# Patient Record
Sex: FEMALE | Race: WHITE | NOT HISPANIC OR LATINO | Employment: UNEMPLOYED | URBAN - NONMETROPOLITAN AREA
[De-identification: names, ages, dates, MRNs, and addresses within clinical notes are randomized per-mention and may not be internally consistent; named-entity substitution may affect disease eponyms.]

---

## 2017-01-02 PROBLEM — F32.9 REACTIVE DEPRESSION: Status: ACTIVE | Noted: 2017-01-02

## 2017-01-04 ENCOUNTER — OFFICE VISIT (OUTPATIENT)
Dept: PULMONOLOGY | Facility: CLINIC | Age: 52
End: 2017-01-04

## 2017-01-04 VITALS
HEIGHT: 64 IN | WEIGHT: 293 LBS | RESPIRATION RATE: 18 BRPM | HEART RATE: 79 BPM | OXYGEN SATURATION: 90 % | BODY MASS INDEX: 50.02 KG/M2 | DIASTOLIC BLOOD PRESSURE: 74 MMHG | SYSTOLIC BLOOD PRESSURE: 122 MMHG

## 2017-01-04 DIAGNOSIS — E66.01 MORBID OBESITY, UNSPECIFIED OBESITY TYPE (HCC): ICD-10-CM

## 2017-01-04 DIAGNOSIS — R06.02 SHORTNESS OF BREATH: Primary | ICD-10-CM

## 2017-01-04 DIAGNOSIS — J30.89 OTHER ALLERGIC RHINITIS: ICD-10-CM

## 2017-01-04 DIAGNOSIS — G47.34 NOCTURNAL HYPOXIA: ICD-10-CM

## 2017-01-04 DIAGNOSIS — I27.20 PULMONARY HYPERTENSION (HCC): ICD-10-CM

## 2017-01-04 DIAGNOSIS — G47.33 OBSTRUCTIVE SLEEP APNEA: ICD-10-CM

## 2017-01-04 DIAGNOSIS — I82.4Y9 DEEP VEIN THROMBOSIS (DVT) OF PROXIMAL LOWER EXTREMITY, UNSPECIFIED CHRONICITY, UNSPECIFIED LATERALITY (HCC): ICD-10-CM

## 2017-01-04 DIAGNOSIS — J44.9 CHRONIC OBSTRUCTIVE PULMONARY DISEASE, UNSPECIFIED COPD TYPE (HCC): ICD-10-CM

## 2017-01-04 PROCEDURE — 99214 OFFICE O/P EST MOD 30 MIN: CPT | Performed by: INTERNAL MEDICINE

## 2017-01-04 RX ORDER — FLUTICASONE PROPIONATE 50 MCG
1 SPRAY, SUSPENSION (ML) NASAL 2 TIMES DAILY
Qty: 1 EACH | Refills: 5 | Status: SHIPPED | OUTPATIENT
Start: 2017-01-04 | End: 2019-01-15 | Stop reason: SDUPTHER

## 2017-01-04 NOTE — PROGRESS NOTES
"Chief Complaint   Patient presents with   • Follow-up         Subjective   Chante Wetzel is a 51 y.o. female.     History of Present Illness   Patient comes in today to follow-up on the results of 2-D echocardiogram.  She is also here to follow-up on COPD, obstructive sleep apnea and DVT/PE    The patient continues to be on Coumadin and denies any episodes of bleeding    She continues to feel that the Tudorza is helping her symptoms to a great degree.  She still has occasional cough and shortness of breath with exertion     She denies any lower extremity swelling    She is using her CPAP device at a pressure of 13 without any significant issues.  She continues to need oxygen to be bled in with the CPAP device    The following portions of the patient's history were reviewed and updated as appropriate: allergies, current medications, past family history, past medical history, past social history and past surgical history.    Review of Systems   HENT: Positive for sinus pressure and sneezing. Negative for sore throat.    Respiratory: Positive for shortness of breath. Negative for cough and wheezing.        Objective   Visit Vitals   • /74   • Pulse 79   • Resp 18   • Ht 64\" (162.6 cm)   • Wt 300 lb (136 kg)   • SpO2 90%   • BMI 51.49 kg/m2     Physical Exam  Constitutional:           General: No acute distress noted.     ENT:           Mallempati III/IV. Crowded oropharynx.            Tonsils not erythematous or enlarged.           Sinuses were non tender on palpation.           Nostril showed moderate erythema.    Neck:           Increased adipose tissue noted.     Cardiovascular:              S1 + S2.  2+/6 systolic ejection murmur    Respiratory:          Respiratory effort was normal          Normal to percussion.          Good Air Entry Bilaterally with no wheezing or crackles heard.    Musculoskeletal:           Normal Gait.            No edema noted    Neurologic:            AAO x 3.             Affect " was appropriate    Assessment/Plan   Chante was seen today for follow-up.    Diagnoses and all orders for this visit:    Shortness of breath    Obstructive sleep apnea    Pulmonary hypertension  -     Ambulatory Referral to Cardiology    Chronic obstructive pulmonary disease, unspecified COPD type    Morbid obesity, unspecified obesity type    Nocturnal hypoxia    Deep vein thrombosis (DVT) of proximal lower extremity, unspecified chronicity, unspecified laterality    Other allergic rhinitis    Other orders  -     albuterol (PROAIR RESPICLICK) 108 (90 BASE) MCG/ACT inhaler; Inhale 1 puff Every 4 (Four) Hours As Needed for wheezing or shortness of air. Patient can't tolerate MDI/HFAs.  -     fluticasone (FLONASE) 50 MCG/ACT nasal spray; 1 spray into each nostril 2 (Two) Times a Day.         Return in about 2 months (around 3/4/2017) for Recheck ; RHC results, Cardio consult.    DISCUSSION (if any):  Have had a very long discussion with the patient including review of her past medical records from [facility] and once again showed her that the VQ scan was intermediate probability for pulmonary embolism.  The patient has been on Coumadin since then and continues to be on it    I will also reviewed her 2-D echocardiogram and told her that based on my comparison to the last echo from 2014, findings seem to have remained stable.  Although pulmonary pressures were not measured on the echocardiogram interpretation, the detail measurement lists an RVSP of 65 which once again seems to be very close to the finding in 2014    The patient is also on sildenafil for pulmonary hypertension and although clinically, she seems to have secondary pulmonary hypertension due to underlying moderate COPD and obstructive sleep apnea, I feel that she needs a right heart catheterization with measurements of PVR, pulmonary artery wedge pressure, mean pulmonary artery pressure, right-sided pressures, vasoreactivity testing and fluid  challenge to ascertain the contribution of diastolic dysfunction towards her pulmonary pressures, if her wedge pressure initially is less than 15    I've made no changes to her current medication including Tudorza    Patient was advised to use albuterol based rescue inhaler for when necessary purposes    Patient was also advised to keep a log of the use of rescue inhaler.    Patient will be started on nasal steroids for symptoms which are definitely consistent with allergic rhinitis.     If symptoms do not improve, then we will consider ordering  IgE/RAST panel.    I once again asked her to discuss the possibility of switching her to a newer agent such as Xarelto, Pradaxa or Eliquis.    Continue CPAP at a pressure of 13 with oxygen bled in      Errors in dictation may reflect use of voice recognition software and not all errors in transcription may have been detected prior to signing    This document was electronically signed by Yuridia Mccormack MD January 4, 2017  2:23 PM

## 2017-01-04 NOTE — LETTER
"January 4, 2017     Jade Husain DO  [address] [facility] [place] [address]    Patient: Chante Wetzel   YOB: 1965   Date of Visit: 1/4/2017       Dear Dr. Rodrigue DO:    Chante Wetzel was in my office today. Below is a copy of my note.    If you have questions, please do not hesitate to call me. I look forward to following Chante along with you.         Sincerely,        Yuriida Mccormack MD        CC: Cameron Baires MD    Chief Complaint   Patient presents with   • Follow-up         Subjective   Chante Wetzel is a 51 y.o. female.     History of Present Illness   Patient comes in today to follow-up on the results of 2-D echocardiogram.  She is also here to follow-up on COPD, obstructive sleep apnea and DVT/PE    The patient continues to be on Coumadin and denies any episodes of bleeding    She continues to feel that the Tudorza is helping her symptoms to a great degree.  She still has occasional cough and shortness of breath with exertion     She denies any lower extremity swelling    She is using her CPAP device at a pressure of 13 without any significant issues.  She continues to need oxygen to be bled in with the CPAP device    The following portions of the patient's history were reviewed and updated as appropriate: allergies, current medications, past family history, past medical history, past social history and past surgical history.    Review of Systems   HENT: Positive for sinus pressure and sneezing. Negative for sore throat.    Respiratory: Positive for shortness of breath. Negative for cough and wheezing.        Objective   Visit Vitals   • /74   • Pulse 79   • Resp 18   • Ht 64\" (162.6 cm)   • Wt 300 lb (136 kg)   • SpO2 90%   • BMI 51.49 kg/m2     Physical Exam  Constitutional:           General: No acute distress noted.     ENT:           Mallempati III/IV. Crowded oropharynx.            Tonsils not erythematous or enlarged.           Sinuses were non tender on " palpation.           Nostril showed moderate erythema.    Neck:           Increased adipose tissue noted.     Cardiovascular:              S1 + S2.  2+/6 systolic ejection murmur    Respiratory:          Respiratory effort was normal          Normal to percussion.          Good Air Entry Bilaterally with no wheezing or crackles heard.    Musculoskeletal:           Normal Gait.            No edema noted    Neurologic:            AAO x 3.             Affect was appropriate    Assessment/Plan   Chante was seen today for follow-up.    Diagnoses and all orders for this visit:    Shortness of breath    Obstructive sleep apnea    Pulmonary hypertension  -     Ambulatory Referral to Cardiology    Chronic obstructive pulmonary disease, unspecified COPD type    Morbid obesity, unspecified obesity type    Nocturnal hypoxia    Deep vein thrombosis (DVT) of proximal lower extremity, unspecified chronicity, unspecified laterality    Other allergic rhinitis    Other orders  -     albuterol (PROAIR RESPICLICK) 108 (90 BASE) MCG/ACT inhaler; Inhale 1 puff Every 4 (Four) Hours As Needed for wheezing or shortness of air. Patient can't tolerate MDI/HFAs.  -     fluticasone (FLONASE) 50 MCG/ACT nasal spray; 1 spray into each nostril 2 (Two) Times a Day.         Return in about 2 months (around 3/4/2017) for Recheck ; RHC results, Cardio consult.    DISCUSSION (if any):  Have had a very long discussion with the patient including review of her past medical records from [facility] and once again showed her that the VQ scan was intermediate probability for pulmonary embolism.  The patient has been on Coumadin since then and continues to be on it    I will also reviewed her 2-D echocardiogram and told her that based on my comparison to the last echo from 2014, findings seem to have remained stable.  Although pulmonary pressures were not measured on the echocardiogram interpretation, the detail measurement lists an RVSP of 65  which once again seems to be very close to the finding in 2014    The patient is also on sildenafil for pulmonary hypertension and although clinically, she seems to have secondary pulmonary hypertension due to underlying moderate COPD and obstructive sleep apnea, I feel that she needs a right heart catheterization with measurements of PVR, pulmonary artery wedge pressure, mean pulmonary artery pressure, right-sided pressures, vasoreactivity testing and fluid challenge to ascertain the contribution of diastolic dysfunction towards her pulmonary pressures, if her wedge pressure initially is less than 15    I've made no changes to her current medication including Tudorza    Patient was advised to use albuterol based rescue inhaler for when necessary purposes    Patient was also advised to keep a log of the use of rescue inhaler.    Patient will be started on nasal steroids for symptoms which are definitely consistent with allergic rhinitis.     If symptoms do not improve, then we will consider ordering  IgE/RAST panel.    I once again asked her to discuss the possibility of switching her to a newer agent such as Xarelto, Pradaxa or Eliquis.    Continue CPAP at a pressure of 13 with oxygen bled in      Errors in dictation may reflect use of voice recognition software and not all errors in transcription may have been detected prior to signing    This document was electronically signed by Yuridia Mccormack MD January 4, 2017  2:23 PM

## 2017-01-04 NOTE — MR AVS SNAPSHOT
Chante Wetzel   1/4/2017 1:30 PM   Office Visit    Dept Phone:  [phone]   Encounter #:  [id number]    Provider:  Yuridia Mccormack MD   Department:  [facility] PULMONARY CRITICAL CARE AND SLEEP                Your Full Care Plan              Today's Medication Changes          These changes are accurate as of: 1/4/17  2:04 PM.  If you have any questions, ask your nurse or doctor.               Medication(s)that have changed:     * albuterol 108 (90 BASE) MCG/ACT inhaler   Commonly known as:  PROAIR RESPICLICK   Inhale 1 puff every 4 (four) hours as needed for wheezing or shortness of air.   What changed:  Another medication with the same name was added. Make sure you understand how and when to take each.   Changed by:  Yuridia Mccormack MD       * albuterol 108 (90 BASE) MCG/ACT inhaler   Commonly known as:  PROAIR RESPICLICK   Inhale 1 puff Every 4 (Four) Hours As Needed for wheezing or shortness of air. Patient can't tolerate MDI/HFAs.   What changed:  You were already taking a medication with the same name, and this prescription was added. Make sure you understand how and when to take each.   Changed by:  Yuridia Mccormack MD       fluticasone 50 MCG/ACT nasal spray   Commonly known as:  FLONASE   1 spray into each nostril 2 (Two) Times a Day.   What changed:  See the new instructions.   Changed by:  Yuridia Mccormack MD       * Notice:  This list has 2 medication(s) that are the same as other medications prescribed for you. Read the directions carefully, and ask your doctor or other care provider to review them with you.         Where to Get Your Medications      These medications were sent to [facility] [place] [address] - [address] [facility] - [phone]  - [phone]   [address] [facility] [address]     Phone:  [phone]     fluticasone 50 MCG/ACT nasal spray         You can get these medications from any pharmacy     Bring a paper  prescription for each of these medications     albuterol 108 (90 BASE) MCG/ACT inhaler                  Your Updated Medication List          This list is accurate as of: 1/4/17  2:04 PM.  Always use your most recent med list.                * albuterol 108 (90 BASE) MCG/ACT inhaler   Commonly known as:  PROAIR RESPICLICK   Inhale 1 puff every 4 (four) hours as needed for wheezing or shortness of air.       * albuterol 108 (90 BASE) MCG/ACT inhaler   Commonly known as:  PROAIR RESPICLICK   Inhale 1 puff Every 4 (Four) Hours As Needed for wheezing or shortness of air. Patient can't tolerate MDI/HFAs.       bumetanide 1 MG tablet   Commonly known as:  BUMEX       COMBIVENT RESPIMAT  MCG/ACT inhaler   Generic drug:  ipratropium-albuterol   Inhale 1 puff 4 (Four) Times a Day.       COUMADIN 2 MG tablet   Generic drug:  warfarin       ferrous sulfate 325 (65 FE) MG tablet       fluticasone 50 MCG/ACT nasal spray   Commonly known as:  FLONASE   1 spray into each nostril 2 (Two) Times a Day.       gabapentin 800 MG tablet   Commonly known as:  NEURONTIN       HYDROcodone-acetaminophen 7.5-325 MG per tablet   Commonly known as:  NORCO       lisinopril 5 MG tablet   Commonly known as:  PRINIVIL,ZESTRIL       metoprolol tartrate 25 MG tablet   Commonly known as:  LOPRESSOR       nystatin 585307 UNIT/ML suspension   Commonly known as:  MYCOSTATIN       pregabalin 75 MG capsule   Commonly known as:  LYRICA       sildenafil 20 MG tablet   Commonly known as:  REVATIO       triamcinolone 0.025 % cream   Commonly known as:  KENALOG       TUDORZA PRESSAIR 400 MCG/ACT aerosol powder  powder for inhalation   Generic drug:  aclidinium bromide       VITAMIN K PO       * Notice:  This list has 2 medication(s) that are the same as other medications prescribed for you. Read the directions carefully, and ask your doctor or other care provider to review them with you.            We Performed the Following     Ambulatory Referral to  Cardiology       You Were Diagnosed With        Codes Comments    Shortness of breath    -  Primary ICD-10-CM: R06.02  ICD-9-CM: 786.05     Obstructive sleep apnea     ICD-10-CM: G47.33  ICD-9-CM: 327.23     Pulmonary hypertension     ICD-10-CM: I27.2  ICD-9-CM: 416.8     Chronic obstructive pulmonary disease, unspecified COPD type     ICD-10-CM: J44.9  ICD-9-CM: 496     Morbid obesity, unspecified obesity type     ICD-10-CM: E66.01  ICD-9-CM: 278.01     Nocturnal hypoxia     ICD-10-CM: G47.34  ICD-9-CM: 327.24     Deep vein thrombosis (DVT) of proximal lower extremity, unspecified chronicity, unspecified laterality     ICD-10-CM: I82.4Y9  ICD-9-CM: 453.41     Other allergic rhinitis     ICD-10-CM: J30.89  ICD-9-CM: 477.8       Instructions     None    Patient Instructions History      Upcoming Appointments     Visit Type Date Time Department    FOLLOW UP 2017  1:30 PM [facility]    FOLLOW UP 3/6/2017  2:15 PM [facility]      MyChart Signup     [facility] [employer] allows you to send messages to your doctor, view your test results, renew your prescriptions, schedule appointments, and more. To sign up, go to [employer] and click on the Sign Up Now link in the New User? box. Enter your [employer] Activation Code exactly as it appears below along with the last four digits of your Social Security Number and your Date of Birth () to complete the sign-up process. If you do not sign up before the expiration date, you must request a new code.    [employer] Activation Code: [email or web address]  Expires: 2017  2:04 PM    If you have questions, you can email [email or web address] or call [phone] to talk to our [employer] staff. Remember, [employer] is NOT to be used for urgent needs. For medical emergencies, dial 911.               Other Info from Your Visit           Your Appointments     Mar 06, 2017  2:15 PM EST   Follow Up with Yuridia Mccormack MD   [facility]  "GROUP PULMONARY CRITICAL CARE AND SLEEP (--)    [address]  [facility] [address] [phone]           Arrive 15 minutes prior to appointment.              Allergies     Erythromycin  Rash      Reason for Visit     Follow-up           Vital Signs     Blood Pressure Pulse Respirations Height Weight Oxygen Saturation    122/74 79 18 64\" (162.6 cm) 300 lb (136 kg) 90%    Body Mass Index Smoking Status                51.49 kg/m2 Former Smoker          Problems and Diagnoses Noted     Chronic airway obstruction    Shortness of breath    Obesity    Pulmonary hypertension    Sleep apnea        Nocturnal hypoxia        Blood clot in leg        Other allergic rhinitis            "

## 2017-01-25 DIAGNOSIS — M54.9 CHRONIC BACK PAIN, UNSPECIFIED BACK LOCATION, UNSPECIFIED BACK PAIN LATERALITY: ICD-10-CM

## 2017-01-25 DIAGNOSIS — G89.29 CHRONIC BACK PAIN, UNSPECIFIED BACK LOCATION, UNSPECIFIED BACK PAIN LATERALITY: ICD-10-CM

## 2017-01-26 RX ORDER — HYDROCODONE BITARTRATE AND ACETAMINOPHEN 7.5; 325 MG/1; MG/1
1 TABLET ORAL EVERY 8 HOURS PRN
Qty: 90 TABLET | Refills: 0 | Status: SHIPPED | OUTPATIENT
Start: 2017-01-26 | End: 2017-02-24 | Stop reason: SDUPTHER

## 2017-02-13 RX ORDER — PREGABALIN 75 MG/1
75 CAPSULE ORAL 2 TIMES DAILY
Qty: 60 CAPSULE | Refills: 1 | Status: SHIPPED | OUTPATIENT
Start: 2017-02-13 | End: 2017-04-15 | Stop reason: SDUPTHER

## 2017-02-24 DIAGNOSIS — M54.9 CHRONIC BACK PAIN, UNSPECIFIED BACK LOCATION, UNSPECIFIED BACK PAIN LATERALITY: ICD-10-CM

## 2017-02-24 DIAGNOSIS — G89.29 CHRONIC BACK PAIN, UNSPECIFIED BACK LOCATION, UNSPECIFIED BACK PAIN LATERALITY: ICD-10-CM

## 2017-02-24 RX ORDER — HYDROCODONE BITARTRATE AND ACETAMINOPHEN 7.5; 325 MG/1; MG/1
1 TABLET ORAL EVERY 8 HOURS PRN
Qty: 90 TABLET | Refills: 0 | Status: SHIPPED | OUTPATIENT
Start: 2017-02-24 | End: 2017-03-22 | Stop reason: SDUPTHER

## 2017-03-09 ENCOUNTER — OFFICE VISIT (OUTPATIENT)
Dept: PRIMARY CARE CLINIC | Age: 52
End: 2017-03-09
Payer: MEDICAID

## 2017-03-09 ENCOUNTER — HOSPITAL ENCOUNTER (OUTPATIENT)
Dept: OTHER | Age: 52
Discharge: HOME | End: 2017-03-09
Attending: FAMILY MEDICINE | Admitting: FAMILY MEDICINE

## 2017-03-09 VITALS
DIASTOLIC BLOOD PRESSURE: 80 MMHG | WEIGHT: 293 LBS | HEIGHT: 64 IN | OXYGEN SATURATION: 98 % | HEART RATE: 62 BPM | SYSTOLIC BLOOD PRESSURE: 138 MMHG | BODY MASS INDEX: 50.02 KG/M2 | RESPIRATION RATE: 20 BRPM

## 2017-03-09 DIAGNOSIS — Z79.01 CHRONIC ANTICOAGULATION: ICD-10-CM

## 2017-03-09 DIAGNOSIS — J43.1 PANLOBULAR EMPHYSEMA (HCC): ICD-10-CM

## 2017-03-09 DIAGNOSIS — E11.42 TYPE 2 DIABETES MELLITUS WITH DIABETIC POLYNEUROPATHY, WITHOUT LONG-TERM CURRENT USE OF INSULIN (HCC): ICD-10-CM

## 2017-03-09 DIAGNOSIS — I10 ESSENTIAL HYPERTENSION: ICD-10-CM

## 2017-03-09 DIAGNOSIS — I10 ESSENTIAL HYPERTENSION: Primary | ICD-10-CM

## 2017-03-09 DIAGNOSIS — I27.20 PULMONARY HYPERTENSION (HCC): ICD-10-CM

## 2017-03-09 LAB
A/G RATIO: 1.3 (ref 0.8–2)
ALBUMIN SERPL-MCNC: 4.2 G/DL (ref 3.4–4.8)
ALP BLD-CCNC: 118 U/L (ref 25–100)
ALT SERPL-CCNC: 16 U/L (ref 4–36)
ANION GAP SERPL CALCULATED.3IONS-SCNC: 15 MMOL/L (ref 3–16)
AST SERPL-CCNC: 14 U/L (ref 8–33)
BILIRUB SERPL-MCNC: 1 MG/DL (ref 0.3–1.2)
BUN BLDV-MCNC: 14 MG/DL (ref 6–20)
CALCIUM SERPL-MCNC: 9.7 MG/DL (ref 8.5–10.5)
CHLORIDE BLD-SCNC: 98 MMOL/L (ref 98–107)
CHOLESTEROL, TOTAL: 113 MG/DL (ref 0–200)
CO2: 26 MMOL/L (ref 20–30)
CREAT SERPL-MCNC: 1 MG/DL (ref 0.4–1.2)
CREATININE URINE: 45.1 MG/DL (ref 1.5–300)
GFR AFRICAN AMERICAN: >59
GFR NON-AFRICAN AMERICAN: 58
GLOBULIN: 3.3 G/DL
GLUCOSE BLD-MCNC: 98 MG/DL (ref 74–106)
HBA1C MFR BLD: 6.8 %
HCT VFR BLD CALC: 45.5 % (ref 37–47)
HDLC SERPL-MCNC: 39 MG/DL (ref 40–60)
HEMOGLOBIN: 14.5 G/DL (ref 11.5–16.5)
INR BLD: 2.9 (ref 0.85–1.15)
LDL CHOLESTEROL CALCULATED: 43 MG/DL
LDL CHOLESTEROL DIRECT: 58 MG/DL
MCH RBC QN AUTO: 30.2 PG (ref 27–32)
MCHC RBC AUTO-ENTMCNC: 31.9 G/DL (ref 31–35)
MCV RBC AUTO: 94.8 FL (ref 80–100)
MICROALBUMIN UR-MCNC: <1.2 MG/DL (ref 0–22)
MICROALBUMIN/CREAT UR-RTO: NORMAL MG/G (ref 0–30)
PDW BLD-RTO: 13.9 % (ref 11–16)
PLATELET # BLD: 280 K/UL (ref 150–400)
PMV BLD AUTO: 11.7 FL (ref 6–10)
POTASSIUM SERPL-SCNC: 4.4 MMOL/L (ref 3.4–5.1)
PROTHROMBIN TIME: 31.9 SEC (ref 9.8–11.5)
RBC # BLD: 4.8 M/UL (ref 3.8–5.8)
SODIUM BLD-SCNC: 139 MMOL/L (ref 136–145)
TOTAL PROTEIN: 7.5 G/DL (ref 6.4–8.3)
TRIGL SERPL-MCNC: 154 MG/DL (ref 0–249)
VLDLC SERPL CALC-MCNC: 31 MG/DL
WBC # BLD: 9.1 K/UL (ref 4–11)

## 2017-03-09 PROCEDURE — 99214 OFFICE O/P EST MOD 30 MIN: CPT | Performed by: FAMILY MEDICINE

## 2017-03-09 RX ORDER — SILDENAFIL CITRATE 20 MG/1
20 TABLET ORAL 3 TIMES DAILY
Qty: 90 TABLET | Refills: 3 | Status: SHIPPED | OUTPATIENT
Start: 2017-03-09 | End: 2017-09-21 | Stop reason: SDUPTHER

## 2017-03-09 ASSESSMENT — ENCOUNTER SYMPTOMS
EYE ITCHING: 0
EYE REDNESS: 0
EYE DISCHARGE: 0
ABDOMINAL PAIN: 0
BACK PAIN: 1
NAUSEA: 0
RHINORRHEA: 0
VOMITING: 0
SHORTNESS OF BREATH: 0
DIARRHEA: 0
CONSTIPATION: 0
COUGH: 0
SORE THROAT: 0

## 2017-03-22 DIAGNOSIS — M54.9 CHRONIC BACK PAIN, UNSPECIFIED BACK LOCATION, UNSPECIFIED BACK PAIN LATERALITY: ICD-10-CM

## 2017-03-22 DIAGNOSIS — G89.29 CHRONIC BACK PAIN, UNSPECIFIED BACK LOCATION, UNSPECIFIED BACK PAIN LATERALITY: ICD-10-CM

## 2017-03-22 RX ORDER — HYDROCODONE BITARTRATE AND ACETAMINOPHEN 7.5; 325 MG/1; MG/1
1 TABLET ORAL EVERY 8 HOURS PRN
Qty: 90 TABLET | Refills: 0 | Status: SHIPPED | OUTPATIENT
Start: 2017-03-24 | End: 2017-04-24 | Stop reason: SDUPTHER

## 2017-03-29 DIAGNOSIS — E11.40 TYPE 2 DIABETES MELLITUS WITH DIABETIC NEUROPATHY, WITHOUT LONG-TERM CURRENT USE OF INSULIN (HCC): ICD-10-CM

## 2017-03-29 DIAGNOSIS — I10 ESSENTIAL HYPERTENSION: ICD-10-CM

## 2017-03-30 RX ORDER — LISINOPRIL 5 MG/1
TABLET ORAL
Qty: 30 TABLET | Refills: 3 | Status: SHIPPED | OUTPATIENT
Start: 2017-03-30 | End: 2017-06-09 | Stop reason: SDUPTHER

## 2017-04-07 DIAGNOSIS — M54.2 CHRONIC NECK AND BACK PAIN: ICD-10-CM

## 2017-04-07 DIAGNOSIS — M54.9 CHRONIC NECK AND BACK PAIN: ICD-10-CM

## 2017-04-07 DIAGNOSIS — G89.29 CHRONIC NECK AND BACK PAIN: ICD-10-CM

## 2017-04-10 ENCOUNTER — HOSPITAL ENCOUNTER (OUTPATIENT)
Dept: OTHER | Age: 52
Discharge: HOME | End: 2017-04-10
Attending: CHIROPRACTOR | Admitting: CHIROPRACTOR

## 2017-04-10 DIAGNOSIS — I10 ESSENTIAL HYPERTENSION: ICD-10-CM

## 2017-04-10 DIAGNOSIS — M54.2 CHRONIC NECK AND BACK PAIN: ICD-10-CM

## 2017-04-10 DIAGNOSIS — M54.2 NECK PAIN: ICD-10-CM

## 2017-04-10 DIAGNOSIS — M54.9 CHRONIC NECK AND BACK PAIN: ICD-10-CM

## 2017-04-10 DIAGNOSIS — M54.5 LOW BACK PAIN, UNSPECIFIED BACK PAIN LATERALITY, UNSPECIFIED CHRONICITY, WITH SCIATICA PRESENCE UNSPECIFIED: ICD-10-CM

## 2017-04-10 DIAGNOSIS — E11.40 TYPE 2 DIABETES MELLITUS WITH DIABETIC NEUROPATHY, WITHOUT LONG-TERM CURRENT USE OF INSULIN (HCC): ICD-10-CM

## 2017-04-10 DIAGNOSIS — G89.29 CHRONIC NECK AND BACK PAIN: ICD-10-CM

## 2017-04-10 RX ORDER — TIZANIDINE 4 MG/1
TABLET ORAL
Qty: 90 TABLET | Refills: 1 | Status: SHIPPED | OUTPATIENT
Start: 2017-04-10 | End: 2017-06-24 | Stop reason: SDUPTHER

## 2017-04-10 RX ORDER — BUMETANIDE 1 MG/1
1 TABLET ORAL DAILY
Qty: 60 TABLET | Refills: 3 | Status: SHIPPED | OUTPATIENT
Start: 2017-04-10 | End: 2017-12-08 | Stop reason: SDUPTHER

## 2017-04-10 RX ORDER — GLIMEPIRIDE 2 MG/1
2 TABLET ORAL
Qty: 30 TABLET | Refills: 3 | Status: SHIPPED | OUTPATIENT
Start: 2017-04-10 | End: 2017-08-23 | Stop reason: SDUPTHER

## 2017-04-10 RX ORDER — ALBUTEROL SULFATE 90 UG/1
2 AEROSOL, METERED RESPIRATORY (INHALATION) EVERY 6 HOURS PRN
Qty: 1 INHALER | Refills: 3 | Status: SHIPPED | OUTPATIENT
Start: 2017-04-10 | End: 2021-02-15

## 2017-04-10 RX ORDER — FERROUS SULFATE 325(65) MG
325 TABLET ORAL
Qty: 30 TABLET | Refills: 3 | Status: SHIPPED | OUTPATIENT
Start: 2017-04-10 | End: 2017-08-20 | Stop reason: SDUPTHER

## 2017-04-10 RX ORDER — ATORVASTATIN CALCIUM 40 MG/1
40 TABLET, FILM COATED ORAL NIGHTLY
Qty: 30 TABLET | Refills: 3 | Status: SHIPPED | OUTPATIENT
Start: 2017-04-10 | End: 2017-08-11 | Stop reason: SDUPTHER

## 2017-04-10 RX ORDER — TIZANIDINE 4 MG/1
TABLET ORAL
Qty: 90 TABLET | Refills: 1 | Status: CANCELLED | OUTPATIENT
Start: 2017-04-10

## 2017-04-19 ENCOUNTER — OFFICE VISIT (OUTPATIENT)
Dept: PULMONOLOGY | Facility: CLINIC | Age: 52
End: 2017-04-19

## 2017-04-19 VITALS
SYSTOLIC BLOOD PRESSURE: 124 MMHG | OXYGEN SATURATION: 92 % | DIASTOLIC BLOOD PRESSURE: 80 MMHG | WEIGHT: 293 LBS | BODY MASS INDEX: 50.02 KG/M2 | HEART RATE: 83 BPM | RESPIRATION RATE: 18 BRPM | HEIGHT: 64 IN

## 2017-04-19 DIAGNOSIS — R06.02 SHORTNESS OF BREATH: Primary | ICD-10-CM

## 2017-04-19 DIAGNOSIS — I82.4Y9 DEEP VEIN THROMBOSIS (DVT) OF PROXIMAL LOWER EXTREMITY, UNSPECIFIED CHRONICITY, UNSPECIFIED LATERALITY (HCC): ICD-10-CM

## 2017-04-19 DIAGNOSIS — G47.33 OBSTRUCTIVE SLEEP APNEA: ICD-10-CM

## 2017-04-19 DIAGNOSIS — R09.02 HYPOXIA: ICD-10-CM

## 2017-04-19 DIAGNOSIS — E66.01 MORBID OBESITY, UNSPECIFIED OBESITY TYPE (HCC): ICD-10-CM

## 2017-04-19 DIAGNOSIS — G47.34 NOCTURNAL HYPOXIA: ICD-10-CM

## 2017-04-19 DIAGNOSIS — I27.20 PULMONARY HYPERTENSION (HCC): ICD-10-CM

## 2017-04-19 DIAGNOSIS — J44.9 CHRONIC OBSTRUCTIVE PULMONARY DISEASE, UNSPECIFIED COPD TYPE (HCC): ICD-10-CM

## 2017-04-19 PROCEDURE — 94620 PR PULMONARY STRESS TESTING,SIMPLE: CPT | Performed by: INTERNAL MEDICINE

## 2017-04-19 PROCEDURE — 99214 OFFICE O/P EST MOD 30 MIN: CPT | Performed by: INTERNAL MEDICINE

## 2017-04-19 NOTE — PROGRESS NOTES
"Chief Complaint   Patient presents with   • Follow-up     SOB, ARIANNA         Subjective   Chante Wetzel is a 52 y.o. female.     History of Present Illness   Patient comes in today to follow-up on shortness of breath,COPD, obstructive sleep apnea and pulmonary hypertension.    She is using her Tudorza regularly and requires Ventolin once or twice a week.  She denies any significant cough or phlegm production.    She continues to use Coumadin on a regular basis.     She is using her CPAP device at a pressure of 13 with 2 L/m bled in without any significant issues whatsoever.    She is also using oxygen with exertion and at night.     The following portions of the patient's history were reviewed and updated as appropriate: allergies, current medications, past family history, past medical history, past social history and past surgical history.    Review of Systems   HENT: Positive for sneezing. Negative for sinus pressure and sore throat.    Respiratory: Positive for wheezing. Negative for cough and shortness of breath.        Objective   Visit Vitals   • /80   • Pulse 83   • Resp 18   • Ht 64\" (162.6 cm)   • Wt (!) 311 lb 9.6 oz (141 kg)   • SpO2 92%   • BMI 53.49 kg/m2       Physical Exam   Constitutional: She is oriented to person, place, and time. She appears well-developed and well-nourished.   HENT:   Head: Atraumatic.   Crowded oropharynx.   Eyes: EOM are normal.   Neck: Neck supple.   Increased adipose tissue   Cardiovascular: Normal rate and regular rhythm.    Pulmonary/Chest: Effort normal. No respiratory distress.   Somewhat hyperresonant to percussion  Somewhat decreased A/E without wheezing noted.   Musculoskeletal: She exhibits edema (Trace).   Gait was intact.   Neurological: She is alert and oriented to person, place, and time.   Skin: Skin is warm and dry.   Psychiatric: She has a normal mood and affect.   Vitals reviewed.        Assessment/Plan   Chante was seen today for " follow-up.    Diagnoses and all orders for this visit:    Shortness of breath  -     Ambulatory Referral to Cardiology  -     Converted Six Minute Walk    Chronic obstructive pulmonary disease, unspecified COPD type  -     Converted Six Minute Walk    Obstructive sleep apnea    Pulmonary hypertension  -     Ambulatory Referral to Cardiology    Morbid obesity, unspecified obesity type    Deep vein thrombosis (DVT) of proximal lower extremity, unspecified chronicity, unspecified laterality    Nocturnal hypoxia    Hypoxia         Return in about 3 months (around 7/19/2017) for Recheck, Echo results.    DISCUSSION (if any):  Reviewed the patient's records once again and told her that it seems that she is on Coumadin since she had 2 separate episodes concerning for pulmonary embolism/DVT.  Her first episode was in 2004 and then when she was admitted in 2014 it seems that one of the V/Q scans at [facility] was considered concerning for pulmonary embolism.  The patient's echocardiogram was reviewed and it continues to show elevated pulmonary pressures.  The echo actually suggests pulmonary hypertension to be severe.    ============================  ============================    I've asked the patient to continue her current medications.    She clearly has resting hypoxemia which responded to addition of 2 L nasal cannula and she will need to continue oxygen 24/7.    Her 6 minute walk test suggested the 2 L also sustained her oxygen levels at more than 90% throughout.    I once again asked her to discuss the possibility of switching her from Coumadin to either Eliquis, Xarelto or Pradaxa, for pulmonary embolism/DVT, since she would need to remain on anticoagulation for the rest of her life. This needs to be discussed with her PCP.    As far as her elevated pulmonary pressures are concerned, I have arranged a consultation with cardiology as she will definitely need a right heart catheterization to further  quantify right heart pressures and to rule out any left-sided disease that may be contributing.    Converted Six Minute Walk  Date/Time: 4/19/2017 3:55 PM  Performed by: YURIDIA MCCORMACK  Authorized by: YURIDIA MCCORMACK   Comments: 6 minute walk test    Total distance walked: 144 meters  Oxygen levels remained at more than 91 % during the walk.    Walk test was limited because of significant back pain        Errors in dictation may reflect use of voice recognition software and not all errors in transcription may have been detected prior to signing    This document was electronically signed by Yuridia Mccormack MD April 19, 2017  3:57 PM

## 2017-04-24 DIAGNOSIS — G89.29 CHRONIC BACK PAIN, UNSPECIFIED BACK LOCATION, UNSPECIFIED BACK PAIN LATERALITY: ICD-10-CM

## 2017-04-24 DIAGNOSIS — M54.9 CHRONIC BACK PAIN, UNSPECIFIED BACK LOCATION, UNSPECIFIED BACK PAIN LATERALITY: ICD-10-CM

## 2017-04-24 RX ORDER — HYDROCODONE BITARTRATE AND ACETAMINOPHEN 7.5; 325 MG/1; MG/1
1 TABLET ORAL EVERY 8 HOURS PRN
Qty: 90 TABLET | Refills: 0 | Status: SHIPPED | OUTPATIENT
Start: 2017-04-24 | End: 2017-05-23 | Stop reason: SDUPTHER

## 2017-04-24 RX ORDER — ERGOCALCIFEROL 1.25 MG/1
CAPSULE ORAL
Qty: 8 CAPSULE | Refills: 0 | Status: SHIPPED | OUTPATIENT
Start: 2017-04-24 | End: 2017-07-16 | Stop reason: SDUPTHER

## 2017-04-25 ENCOUNTER — TELEPHONE (OUTPATIENT)
Dept: PRIMARY CARE CLINIC | Age: 52
End: 2017-04-25

## 2017-04-26 ENCOUNTER — TELEPHONE (OUTPATIENT)
Dept: PULMONOLOGY | Facility: CLINIC | Age: 52
End: 2017-04-26

## 2017-04-26 NOTE — TELEPHONE ENCOUNTER
CALLED AND TALKED TO PHARMACY, SPOKE TO DIANNE, SAID THAT INCRUSE WAS COVERED, SWITCHED PATIENT FROM [facility].

## 2017-05-23 DIAGNOSIS — M54.9 CHRONIC BACK PAIN, UNSPECIFIED BACK LOCATION, UNSPECIFIED BACK PAIN LATERALITY: ICD-10-CM

## 2017-05-23 DIAGNOSIS — G89.29 CHRONIC BACK PAIN, UNSPECIFIED BACK LOCATION, UNSPECIFIED BACK PAIN LATERALITY: ICD-10-CM

## 2017-05-23 RX ORDER — HYDROCODONE BITARTRATE AND ACETAMINOPHEN 7.5; 325 MG/1; MG/1
1 TABLET ORAL EVERY 8 HOURS PRN
Qty: 90 TABLET | Refills: 0 | Status: SHIPPED | OUTPATIENT
Start: 2017-05-23 | End: 2017-06-20 | Stop reason: SDUPTHER

## 2017-05-30 ENCOUNTER — CONSULT (OUTPATIENT)
Dept: CARDIOLOGY | Facility: CLINIC | Age: 52
End: 2017-05-30

## 2017-05-30 VITALS
WEIGHT: 293 LBS | OXYGEN SATURATION: 96 % | DIASTOLIC BLOOD PRESSURE: 60 MMHG | SYSTOLIC BLOOD PRESSURE: 120 MMHG | BODY MASS INDEX: 50.02 KG/M2 | HEIGHT: 64 IN | TEMPERATURE: 97.3 F | HEART RATE: 75 BPM

## 2017-05-30 DIAGNOSIS — I27.81 COR PULMONALE (CHRONIC) (HCC): ICD-10-CM

## 2017-05-30 DIAGNOSIS — I10 ESSENTIAL HYPERTENSION: ICD-10-CM

## 2017-05-30 DIAGNOSIS — I27.20 PULMONARY HYPERTENSION (HCC): Primary | ICD-10-CM

## 2017-05-30 PROCEDURE — 93000 ELECTROCARDIOGRAM COMPLETE: CPT | Performed by: INTERNAL MEDICINE

## 2017-05-30 PROCEDURE — 99204 OFFICE O/P NEW MOD 45 MIN: CPT | Performed by: INTERNAL MEDICINE

## 2017-05-30 RX ORDER — ATORVASTATIN CALCIUM 40 MG/1
40 TABLET, FILM COATED ORAL DAILY
COMMUNITY
End: 2018-07-02 | Stop reason: SDUPTHER

## 2017-05-30 RX ORDER — GLIMEPIRIDE 2 MG/1
2 TABLET ORAL
COMMUNITY
End: 2017-10-02 | Stop reason: DRUGHIGH

## 2017-05-30 RX ORDER — TIZANIDINE 4 MG/1
4 TABLET ORAL NIGHTLY PRN
COMMUNITY
End: 2018-07-02 | Stop reason: SDUPTHER

## 2017-06-09 ENCOUNTER — OFFICE VISIT (OUTPATIENT)
Dept: PRIMARY CARE CLINIC | Age: 52
End: 2017-06-09
Payer: MEDICAID

## 2017-06-09 VITALS
HEIGHT: 64 IN | OXYGEN SATURATION: 95 % | SYSTOLIC BLOOD PRESSURE: 124 MMHG | RESPIRATION RATE: 20 BRPM | DIASTOLIC BLOOD PRESSURE: 72 MMHG | WEIGHT: 293 LBS | BODY MASS INDEX: 50.02 KG/M2 | HEART RATE: 68 BPM

## 2017-06-09 DIAGNOSIS — M54.2 CHRONIC NECK AND BACK PAIN: ICD-10-CM

## 2017-06-09 DIAGNOSIS — I10 ESSENTIAL HYPERTENSION: Primary | ICD-10-CM

## 2017-06-09 DIAGNOSIS — E11.40 TYPE 2 DIABETES MELLITUS WITH DIABETIC NEUROPATHY, WITHOUT LONG-TERM CURRENT USE OF INSULIN (HCC): ICD-10-CM

## 2017-06-09 DIAGNOSIS — M54.9 CHRONIC NECK AND BACK PAIN: ICD-10-CM

## 2017-06-09 DIAGNOSIS — G89.29 CHRONIC NECK AND BACK PAIN: ICD-10-CM

## 2017-06-09 DIAGNOSIS — Z79.01 CHRONIC ANTICOAGULATION: ICD-10-CM

## 2017-06-09 DIAGNOSIS — J43.1 PANLOBULAR EMPHYSEMA (HCC): ICD-10-CM

## 2017-06-09 LAB
A/G RATIO: 1.2 (ref 0.8–2)
ALBUMIN SERPL-MCNC: 4.1 G/DL (ref 3.4–4.8)
ALP BLD-CCNC: 108 U/L (ref 25–100)
ALT SERPL-CCNC: 18 U/L (ref 4–36)
ANION GAP SERPL CALCULATED.3IONS-SCNC: 16 MMOL/L (ref 3–16)
AST SERPL-CCNC: 16 U/L (ref 8–33)
BILIRUB SERPL-MCNC: 0.7 MG/DL (ref 0.3–1.2)
BUN BLDV-MCNC: 21 MG/DL (ref 6–20)
CALCIUM SERPL-MCNC: 9.8 MG/DL (ref 8.5–10.5)
CHLORIDE BLD-SCNC: 99 MMOL/L (ref 98–107)
CO2: 26 MMOL/L (ref 20–30)
CREAT SERPL-MCNC: 0.9 MG/DL (ref 0.4–1.2)
GFR AFRICAN AMERICAN: >59
GFR NON-AFRICAN AMERICAN: >60
GLOBULIN: 3.4 G/DL
GLUCOSE BLD-MCNC: 131 MG/DL (ref 74–106)
HCT VFR BLD CALC: 43.5 % (ref 37–47)
HEMOGLOBIN: 14.2 G/DL (ref 11.5–16.5)
INR BLD: 2.9 (ref 0.85–1.15)
MCH RBC QN AUTO: 30.8 PG (ref 27–32)
MCHC RBC AUTO-ENTMCNC: 32.6 G/DL (ref 31–35)
MCV RBC AUTO: 94.4 FL (ref 80–100)
PDW BLD-RTO: 14.1 % (ref 11–16)
PLATELET # BLD: 263 K/UL (ref 150–400)
PMV BLD AUTO: 11.5 FL (ref 6–10)
POTASSIUM SERPL-SCNC: 4.6 MMOL/L (ref 3.4–5.1)
PROTHROMBIN TIME: 31.8 SEC (ref 9.8–11.5)
RBC # BLD: 4.61 M/UL (ref 3.8–5.8)
SODIUM BLD-SCNC: 141 MMOL/L (ref 136–145)
TOTAL PROTEIN: 7.5 G/DL (ref 6.4–8.3)
WBC # BLD: 8.4 K/UL (ref 4–11)

## 2017-06-09 PROCEDURE — 99214 OFFICE O/P EST MOD 30 MIN: CPT | Performed by: FAMILY MEDICINE

## 2017-06-09 RX ORDER — PREGABALIN 75 MG/1
CAPSULE ORAL
Qty: 60 CAPSULE | Refills: 2 | Status: SHIPPED | OUTPATIENT
Start: 2017-06-09 | End: 2017-09-07 | Stop reason: SDUPTHER

## 2017-06-09 RX ORDER — WARFARIN SODIUM 2 MG/1
TABLET ORAL
Qty: 45 TABLET | Refills: 3 | Status: CANCELLED | OUTPATIENT
Start: 2017-06-09

## 2017-06-09 RX ORDER — LISINOPRIL 5 MG/1
TABLET ORAL
Qty: 30 TABLET | Refills: 3 | Status: SHIPPED | OUTPATIENT
Start: 2017-06-09 | End: 2017-11-23 | Stop reason: SDUPTHER

## 2017-06-09 ASSESSMENT — PATIENT HEALTH QUESTIONNAIRE - PHQ9
SUM OF ALL RESPONSES TO PHQ9 QUESTIONS 1 & 2: 0
1. LITTLE INTEREST OR PLEASURE IN DOING THINGS: 0
SUM OF ALL RESPONSES TO PHQ QUESTIONS 1-9: 0
2. FEELING DOWN, DEPRESSED OR HOPELESS: 0

## 2017-06-14 ENCOUNTER — TELEPHONE (OUTPATIENT)
Dept: PRIMARY CARE CLINIC | Age: 52
End: 2017-06-14

## 2017-06-14 DIAGNOSIS — Z79.01 CHRONIC ANTICOAGULATION: Primary | ICD-10-CM

## 2017-06-20 ENCOUNTER — TELEPHONE (OUTPATIENT)
Dept: PRIMARY CARE CLINIC | Age: 52
End: 2017-06-20

## 2017-06-20 DIAGNOSIS — M54.9 CHRONIC BACK PAIN, UNSPECIFIED BACK LOCATION, UNSPECIFIED BACK PAIN LATERALITY: ICD-10-CM

## 2017-06-20 DIAGNOSIS — G89.29 CHRONIC BACK PAIN, UNSPECIFIED BACK LOCATION, UNSPECIFIED BACK PAIN LATERALITY: ICD-10-CM

## 2017-06-20 RX ORDER — HYDROCODONE BITARTRATE AND ACETAMINOPHEN 7.5; 325 MG/1; MG/1
1 TABLET ORAL EVERY 8 HOURS PRN
Qty: 90 TABLET | Refills: 0 | Status: SHIPPED | OUTPATIENT
Start: 2017-06-20 | End: 2017-07-20 | Stop reason: SDUPTHER

## 2017-06-21 LAB
BH CV ECHO MEAS - % IVS THICK: 18.8 %
BH CV ECHO MEAS - % LVPW THICK: 29.6 %
BH CV ECHO MEAS - AO MAX PG (FULL): 7.5 MMHG
BH CV ECHO MEAS - AO MAX PG: 14.1 MMHG
BH CV ECHO MEAS - AO MEAN PG (FULL): 5 MMHG
BH CV ECHO MEAS - AO MEAN PG: 9 MMHG
BH CV ECHO MEAS - AO ROOT AREA (BSA CORRECTED): 1.3
BH CV ECHO MEAS - AO ROOT AREA: 7.5 CM^2
BH CV ECHO MEAS - AO ROOT DIAM: 3.1 CM
BH CV ECHO MEAS - AO V2 MAX: 188 CM/SEC
BH CV ECHO MEAS - AO V2 MEAN: 138 CM/SEC
BH CV ECHO MEAS - AO V2 VTI: 37.8 CM
BH CV ECHO MEAS - BSA(HAYCOCK): 2.6 M^2
BH CV ECHO MEAS - BSA: 2.4 M^2
BH CV ECHO MEAS - BZI_BMI: 53.6 KILOGRAMS/M^2
BH CV ECHO MEAS - BZI_METRIC_HEIGHT: 162.6 CM
BH CV ECHO MEAS - BZI_METRIC_WEIGHT: 141.5 KG
BH CV ECHO MEAS - CONTRAST EF 4CH: 53.2 ML/M^2
BH CV ECHO MEAS - EDV(CUBED): 66.4 ML
BH CV ECHO MEAS - EDV(MOD-SP4): 173 ML
BH CV ECHO MEAS - EDV(TEICH): 72.1 ML
BH CV ECHO MEAS - EF(CUBED): 68.7 %
BH CV ECHO MEAS - EF(MOD-SP4): 62 %
BH CV ECHO MEAS - EF(TEICH): 60.8 %
BH CV ECHO MEAS - ESV(CUBED): 20.8 ML
BH CV ECHO MEAS - ESV(MOD-SP4): 81 ML
BH CV ECHO MEAS - ESV(TEICH): 28.3 ML
BH CV ECHO MEAS - FS: 32.1 %
BH CV ECHO MEAS - IVS/LVPW: 1.2
BH CV ECHO MEAS - IVSD: 1 CM
BH CV ECHO MEAS - IVSS: 1.9 CM
BH CV ECHO MEAS - LA DIMENSION: 3.6 CM
BH CV ECHO MEAS - LA/AO: 1.5
BH CV ECHO MEAS - LV DIASTOLIC VOL/BSA (35-75): 73.2 ML/M^2
BH CV ECHO MEAS - LV MASS(C)D: 230.7 GRAMS
BH CV ECHO MEAS - LV MASS(C)DI: 97.6 GRAMS/M^2
BH CV ECHO MEAS - LV MASS(C)S: 201.4 GRAMS
BH CV ECHO MEAS - LV MASS(C)SI: 85.2 GRAMS/M^2
BH CV ECHO MEAS - LV MAX PG: 6.6 MMHG
BH CV ECHO MEAS - LV MEAN PG: 4 MMHG
BH CV ECHO MEAS - LV SYSTOLIC VOL/BSA (12-30): 34.3 ML/M^2
BH CV ECHO MEAS - LV V1 MAX: 128.5 CM/SEC
BH CV ECHO MEAS - LV V1 MEAN: 89.2 CM/SEC
BH CV ECHO MEAS - LV V1 VTI: 27.8 CM
BH CV ECHO MEAS - LVIDD: 4.1 CM
BH CV ECHO MEAS - LVIDS: 2.8 CM
BH CV ECHO MEAS - LVLD AP4: 7.7 CM
BH CV ECHO MEAS - LVLS AP4: 7.7 CM
BH CV ECHO MEAS - LVPWD: 1 CM
BH CV ECHO MEAS - LVPWS: 1.8 CM
BH CV ECHO MEAS - MV A MAX VEL: 83.2 CM/SEC
BH CV ECHO MEAS - MV DEC SLOPE: 343 CM/SEC^2
BH CV ECHO MEAS - MV E MAX VEL: 96.9 CM/SEC
BH CV ECHO MEAS - MV E/A: 1.2
BH CV ECHO MEAS - MV P1/2T MAX VEL: 92.8 CM/SEC
BH CV ECHO MEAS - MV P1/2T: 79.2 MSEC
BH CV ECHO MEAS - MVA P1/2T LCG: 2.4 CM^2
BH CV ECHO MEAS - MVA(P1/2T): 2.8 CM^2
BH CV ECHO MEAS - PA MAX PG: 5.6 MMHG
BH CV ECHO MEAS - PA V2 MAX: 118 CM/SEC
BH CV ECHO MEAS - RAP SYSTOLE: 10 MMHG
BH CV ECHO MEAS - RVAW: 1.6 CM
BH CV ECHO MEAS - RVDD: 3.3 CM
BH CV ECHO MEAS - RVSP: 30 MMHG
BH CV ECHO MEAS - SI(AO): 120.7 ML/M^2
BH CV ECHO MEAS - SI(CUBED): 19.3 ML/M^2
BH CV ECHO MEAS - SI(MOD-SP4): 38.9 ML/M^2
BH CV ECHO MEAS - SI(TEICH): 18.5 ML/M^2
BH CV ECHO MEAS - SV(AO): 285.3 ML
BH CV ECHO MEAS - SV(CUBED): 45.6 ML
BH CV ECHO MEAS - SV(MOD-SP4): 92 ML
BH CV ECHO MEAS - SV(TEICH): 43.8 ML
BH CV ECHO MEAS - TR MAX VEL: 186 CM/SEC
BH CV ECHO MEAS - TV MAX PG: 2.6 MMHG
BH CV ECHO MEAS - TV V2 MAX: 80.4 CM/SEC
LEFT ATRIUM VOLUME INDEX: 28 ML/M2
LV EF 2D ECHO EST: 64 %

## 2017-06-22 ENCOUNTER — HOSPITAL ENCOUNTER (OUTPATIENT)
Dept: OTHER | Age: 52
Discharge: HOME | End: 2017-06-22
Attending: FAMILY MEDICINE | Admitting: FAMILY MEDICINE

## 2017-06-22 DIAGNOSIS — Z79.01 CHRONIC ANTICOAGULATION: ICD-10-CM

## 2017-06-22 LAB
INR BLD: 1.5 (ref 0.85–1.15)
PROTHROMBIN TIME: 15.8 SEC (ref 9.8–11.5)

## 2017-06-23 ENCOUNTER — TELEPHONE (OUTPATIENT)
Dept: PRIMARY CARE CLINIC | Age: 52
End: 2017-06-23

## 2017-06-24 DIAGNOSIS — M54.9 CHRONIC NECK AND BACK PAIN: ICD-10-CM

## 2017-06-24 DIAGNOSIS — M54.2 CHRONIC NECK AND BACK PAIN: ICD-10-CM

## 2017-06-24 DIAGNOSIS — G89.29 CHRONIC NECK AND BACK PAIN: ICD-10-CM

## 2017-06-26 RX ORDER — TIZANIDINE 4 MG/1
TABLET ORAL
Qty: 90 TABLET | Refills: 1 | Status: SHIPPED | OUTPATIENT
Start: 2017-06-26 | End: 2017-08-29 | Stop reason: SDUPTHER

## 2017-06-28 RX ORDER — BLOOD-GLUCOSE METER
1 KIT MISCELLANEOUS 2 TIMES DAILY
Qty: 1 KIT | Refills: 0 | Status: SHIPPED | OUTPATIENT
Start: 2017-06-28 | End: 2017-11-06 | Stop reason: SDUPTHER

## 2017-07-09 ASSESSMENT — ENCOUNTER SYMPTOMS
BACK PAIN: 1
SORE THROAT: 0
RHINORRHEA: 0
EYE DISCHARGE: 0
COUGH: 0
EYE REDNESS: 0
DIARRHEA: 0
EYE ITCHING: 0
SHORTNESS OF BREATH: 0
CONSTIPATION: 0
VOMITING: 0
NAUSEA: 0
ABDOMINAL PAIN: 0

## 2017-07-11 ENCOUNTER — OFFICE VISIT (OUTPATIENT)
Dept: CARDIOLOGY | Facility: CLINIC | Age: 52
End: 2017-07-11

## 2017-07-11 VITALS
HEIGHT: 64 IN | WEIGHT: 293 LBS | RESPIRATION RATE: 18 BRPM | OXYGEN SATURATION: 96 % | HEART RATE: 85 BPM | BODY MASS INDEX: 50.02 KG/M2 | SYSTOLIC BLOOD PRESSURE: 122 MMHG | DIASTOLIC BLOOD PRESSURE: 78 MMHG

## 2017-07-11 DIAGNOSIS — I10 BENIGN HYPERTENSION: ICD-10-CM

## 2017-07-11 DIAGNOSIS — I27.20 PULMONARY HYPERTENSION (HCC): Primary | ICD-10-CM

## 2017-07-11 PROCEDURE — 99213 OFFICE O/P EST LOW 20 MIN: CPT | Performed by: INTERNAL MEDICINE

## 2017-07-11 NOTE — PROGRESS NOTES
Subjective:     Encounter Date:07/11/2017      Patient ID: Chante Wetzel is a 52 y.o. female.    Chief Complaint:Pulmonary hypertension  HPI  This is a 52-year-old female patient who was recently diagnosed with secondary pulmonary hypertension at the [facility].  She was started on standard therapy with vasodilators as well as phosphodiesterase inhibitor therapy plus chronic anticoagulation therapy.  The patient saw a local pulmonologist who suspected that she may not in fact have pulmonary hypertension and was referred for an echocardiogram.  Echocardiogram performed in clinic showed only mild tricuspid regurgitation but estimated right ventricular systolic pressure was normal.  The pulmonary acceleration time was normal.  Interventricular septal motion was normal and there was no right ventricular or right atrial enlargement.  Left ventricular systolic function was normal globally and regionally.  There was no left ventricular hypertrophy.  Left ventricular diastolic function was normal.  Right ventricular systolic function appeared to be normal.  There is no evidence of valvular pathology, intracardiac shunting or pericardial disease.  The patient has no chest pain or shortness of breath.  There is no orthopnea PND or lower extremity edema.  There is no dizziness palpitations or syncope.  The remainder of her past medical history, family history and social history is unchanged compared to her last visit.  The following portions of the patient's history were reviewed and updated as appropriate: allergies, current medications, past family history, past medical history, past social history, past surgical history and problem  Review of Systems   Constitution: Negative. Negative for chills, diaphoresis, fever, weakness, malaise/fatigue, night sweats, weight gain and weight loss.   HENT: Negative.  Negative for ear discharge, hearing loss, hoarse voice and  nosebleeds.    Eyes: Negative.  Negative for discharge, double vision, pain and photophobia.   Cardiovascular: Negative.  Negative for chest pain, claudication, cyanosis, dyspnea on exertion, irregular heartbeat, leg swelling, near-syncope, orthopnea, palpitations, paroxysmal nocturnal dyspnea and syncope.   Respiratory: Negative.  Negative for cough, hemoptysis, shortness of breath, sputum production and wheezing.    Endocrine: Negative.  Negative for cold intolerance, heat intolerance, polydipsia, polyphagia and polyuria.   Hematologic/Lymphatic: Negative.  Negative for adenopathy and bleeding problem. Does not bruise/bleed easily.   Skin: Negative.  Negative for color change, flushing, itching and rash.   Musculoskeletal: Positive for back pain. Negative for muscle cramps, muscle weakness, myalgias and stiffness.   Gastrointestinal: Negative.  Negative for abdominal pain, diarrhea, hematemesis, hematochezia, nausea and vomiting.   Genitourinary: Negative.  Negative for dysuria, frequency and nocturia.   Neurological: Positive for numbness. Negative for focal weakness, loss of balance, paresthesias and seizures.   Psychiatric/Behavioral: Negative.  Negative for altered mental status, hallucinations and suicidal ideas.   Allergic/Immunologic: Negative.  Negative for HIV exposure, hives and persistent infections.       Current Outpatient Prescriptions:   •  aclidinium bromide (TUDORZA PRESSAIR) 400 MCG/ACT aerosol powder  powder for inhalation, Inhale 1 puff 2 (Two) Times a Day., Disp: , Rfl:   •  apixaban (ELIQUIS) 2.5 MG tablet tablet, Take 2.5 mg by mouth Every 12 (Twelve) Hours., Disp: , Rfl:   •  atorvastatin (LIPITOR) 40 MG tablet, Take 40 mg by mouth Daily., Disp: , Rfl:   •  bumetanide (BUMEX) 1 MG tablet, take 1 tablet by mouth twice a day, Disp: , Rfl:   •  COMBIVENT RESPIMAT  MCG/ACT inhaler, Inhale 1 puff 4 (Four) Times a Day., Disp: 4 g, Rfl: 5  •  ferrous sulfate 325 (65 FE) MG tablet, take 1  tablet by mouth once daily, Disp: , Rfl:   •  fluticasone (FLONASE) 50 MCG/ACT nasal spray, 1 spray into each nostril 2 (Two) Times a Day., Disp: 1 each, Rfl: 5  •  gabapentin (NEURONTIN) 800 MG tablet, 1 PO @ HS, Disp: , Rfl:   •  glimepiride (AMARYL) 2 MG tablet, Take 2 mg by mouth Every Morning Before Breakfast., Disp: , Rfl:   •  HYDROcodone-acetaminophen (NORCO) 7.5-325 MG per tablet, every 8 (eight) hours., Disp: , Rfl:   •  lisinopril (PRINIVIL,ZESTRIL) 5 MG tablet, take 1 tablet by mouth once daily, Disp: , Rfl:   •  metoprolol tartrate (LOPRESSOR) 25 MG tablet, Take 0.5 tablets by mouth 2 times daily, Disp: , Rfl:   •  pregabalin (LYRICA) 75 MG capsule, Take 75 mg by mouth 2 (Two) Times a Day., Disp: , Rfl:   •  sildenafil (REVATIO) 20 MG tablet, Take 1 tablet by mouth 3 times daily, Disp: , Rfl:   •  tiZANidine (ZANAFLEX) 4 MG tablet, Take 4 mg by mouth At Night As Needed for Muscle Spasms., Disp: , Rfl:   •  triamcinolone (KENALOG) 0.025 % cream, , Disp: , Rfl:   •  Umeclidinium Bromide 62.5 MCG/INH aerosol powder , Inhale 1 puff Daily., Disp: 1 each, Rfl: 5  •  VITAMIN K PO, Take  by mouth 1 (One) Time Per Week., Disp: , Rfl:      Objective:     Physical Exam   Constitutional: She is oriented to person, place, and time. She appears well-developed and well-nourished.   HENT:   Head: Normocephalic and atraumatic.   Eyes: Conjunctivae are normal. No scleral icterus.   Cardiovascular: Normal rate, regular rhythm, normal heart sounds and intact distal pulses.  Exam reveals no gallop and no friction rub.    No murmur heard.  Pulmonary/Chest: Effort normal and breath sounds normal. No respiratory distress.   Abdominal: Soft. Bowel sounds are normal. There is no tenderness.   Musculoskeletal: She exhibits no edema.   Neurological: She is alert and oriented to person, place, and time.   Skin: Skin is warm and dry.   Psychiatric: She has a normal mood and affect. Her behavior is normal.     Blood pressure 122/78,  "pulse 85, resp. rate 18, height 64\" (162.6 cm), weight (!) 313 lb (142 kg), SpO2 96 %.   Lab Review:       Assessment:         1. Pulmonary hypertension  No evidence of pulmonary hypertension by echocardiographic parameters.    2. Benign hypertension  Blood pressure control is excellent.  Procedures     Plan:       No additional cardiovascular testing is indicated.  No change in her medication therapy as warranted from my standpoint.  The patient indicates that she is to follow-up with her pulmonologist and decide whether or not she is to continue taking phosphodiesterase inhibitors.         "

## 2017-07-13 ENCOUNTER — TELEPHONE (OUTPATIENT)
Dept: PRIMARY CARE CLINIC | Age: 52
End: 2017-07-13

## 2017-07-17 RX ORDER — ERGOCALCIFEROL 1.25 MG/1
CAPSULE ORAL
Qty: 8 CAPSULE | Refills: 0 | Status: SHIPPED | OUTPATIENT
Start: 2017-07-17 | End: 2017-09-08 | Stop reason: ALTCHOICE

## 2017-07-18 ENCOUNTER — TELEPHONE (OUTPATIENT)
Dept: PRIMARY CARE CLINIC | Age: 52
End: 2017-07-18

## 2017-07-20 DIAGNOSIS — M54.9 CHRONIC BACK PAIN, UNSPECIFIED BACK LOCATION, UNSPECIFIED BACK PAIN LATERALITY: ICD-10-CM

## 2017-07-20 DIAGNOSIS — G89.29 CHRONIC BACK PAIN, UNSPECIFIED BACK LOCATION, UNSPECIFIED BACK PAIN LATERALITY: ICD-10-CM

## 2017-07-20 RX ORDER — HYDROCODONE BITARTRATE AND ACETAMINOPHEN 7.5; 325 MG/1; MG/1
1 TABLET ORAL EVERY 8 HOURS PRN
Qty: 90 TABLET | Refills: 0 | Status: SHIPPED | OUTPATIENT
Start: 2017-07-20 | End: 2017-08-17 | Stop reason: SDUPTHER

## 2017-08-07 ENCOUNTER — TELEPHONE (OUTPATIENT)
Dept: PRIMARY CARE CLINIC | Age: 52
End: 2017-08-07

## 2017-08-11 DIAGNOSIS — E11.40 TYPE 2 DIABETES MELLITUS WITH DIABETIC NEUROPATHY, WITHOUT LONG-TERM CURRENT USE OF INSULIN (HCC): ICD-10-CM

## 2017-08-11 RX ORDER — ATORVASTATIN CALCIUM 40 MG/1
TABLET, FILM COATED ORAL
Qty: 30 TABLET | Refills: 3 | Status: SHIPPED | OUTPATIENT
Start: 2017-08-11 | End: 2017-12-05 | Stop reason: SDUPTHER

## 2017-08-17 DIAGNOSIS — G89.29 CHRONIC BACK PAIN, UNSPECIFIED BACK LOCATION, UNSPECIFIED BACK PAIN LATERALITY: ICD-10-CM

## 2017-08-17 DIAGNOSIS — M54.9 CHRONIC BACK PAIN, UNSPECIFIED BACK LOCATION, UNSPECIFIED BACK PAIN LATERALITY: ICD-10-CM

## 2017-08-17 RX ORDER — HYDROCODONE BITARTRATE AND ACETAMINOPHEN 7.5; 325 MG/1; MG/1
1 TABLET ORAL EVERY 8 HOURS PRN
Qty: 90 TABLET | Refills: 0 | Status: SHIPPED | OUTPATIENT
Start: 2017-08-17 | End: 2017-09-14 | Stop reason: SDUPTHER

## 2017-08-21 RX ORDER — FERROUS SULFATE 325(65) MG
TABLET ORAL
Qty: 30 TABLET | Refills: 3 | Status: SHIPPED | OUTPATIENT
Start: 2017-08-21 | End: 2017-12-08 | Stop reason: SDUPTHER

## 2017-08-23 DIAGNOSIS — E11.40 TYPE 2 DIABETES MELLITUS WITH DIABETIC NEUROPATHY, WITHOUT LONG-TERM CURRENT USE OF INSULIN (HCC): ICD-10-CM

## 2017-08-23 RX ORDER — GLIMEPIRIDE 2 MG/1
TABLET ORAL
Qty: 30 TABLET | Refills: 3 | Status: SHIPPED | OUTPATIENT
Start: 2017-08-23 | End: 2017-09-08 | Stop reason: SDUPTHER

## 2017-08-29 DIAGNOSIS — G89.29 CHRONIC NECK AND BACK PAIN: ICD-10-CM

## 2017-08-29 DIAGNOSIS — M54.9 CHRONIC NECK AND BACK PAIN: ICD-10-CM

## 2017-08-29 DIAGNOSIS — M54.2 CHRONIC NECK AND BACK PAIN: ICD-10-CM

## 2017-08-29 RX ORDER — TIZANIDINE 4 MG/1
TABLET ORAL
Qty: 90 TABLET | Refills: 1 | Status: SHIPPED | OUTPATIENT
Start: 2017-08-29 | End: 2017-10-29 | Stop reason: SDUPTHER

## 2017-09-05 ENCOUNTER — TELEPHONE (OUTPATIENT)
Dept: PRIMARY CARE CLINIC | Age: 52
End: 2017-09-05

## 2017-09-08 ENCOUNTER — OFFICE VISIT (OUTPATIENT)
Dept: PRIMARY CARE CLINIC | Age: 52
End: 2017-09-08
Payer: MEDICAID

## 2017-09-08 VITALS
SYSTOLIC BLOOD PRESSURE: 122 MMHG | BODY MASS INDEX: 50.02 KG/M2 | HEART RATE: 58 BPM | RESPIRATION RATE: 16 BRPM | HEIGHT: 64 IN | WEIGHT: 293 LBS | DIASTOLIC BLOOD PRESSURE: 74 MMHG | OXYGEN SATURATION: 93 %

## 2017-09-08 DIAGNOSIS — I10 ESSENTIAL HYPERTENSION: ICD-10-CM

## 2017-09-08 DIAGNOSIS — Z12.11 SCREENING FOR COLON CANCER: ICD-10-CM

## 2017-09-08 DIAGNOSIS — M54.9 CHRONIC NECK AND BACK PAIN: ICD-10-CM

## 2017-09-08 DIAGNOSIS — J43.1 PANLOBULAR EMPHYSEMA (HCC): ICD-10-CM

## 2017-09-08 DIAGNOSIS — M54.2 CHRONIC NECK AND BACK PAIN: ICD-10-CM

## 2017-09-08 DIAGNOSIS — E11.40 TYPE 2 DIABETES MELLITUS WITH DIABETIC NEUROPATHY, WITHOUT LONG-TERM CURRENT USE OF INSULIN (HCC): Primary | ICD-10-CM

## 2017-09-08 DIAGNOSIS — G89.29 CHRONIC NECK AND BACK PAIN: ICD-10-CM

## 2017-09-08 PROCEDURE — 99214 OFFICE O/P EST MOD 30 MIN: CPT | Performed by: FAMILY MEDICINE

## 2017-09-08 RX ORDER — PREGABALIN 75 MG/1
CAPSULE ORAL
Qty: 60 CAPSULE | Refills: 2 | Status: CANCELLED | OUTPATIENT
Start: 2017-09-08

## 2017-09-08 RX ORDER — GLIMEPIRIDE 4 MG/1
TABLET ORAL
Qty: 30 TABLET | Refills: 2 | Status: SHIPPED | OUTPATIENT
Start: 2017-09-08 | End: 2017-12-05 | Stop reason: SDUPTHER

## 2017-09-08 RX ORDER — UMECLIDINIUM 62.5 UG/1
AEROSOL, POWDER ORAL
Refills: 0 | COMMUNITY
Start: 2017-08-20 | End: 2018-02-01 | Stop reason: SDUPTHER

## 2017-09-08 NOTE — PROGRESS NOTES
SUBJECTIVE:    Patient ID: Tawanda Herman is a 45 y.o. female. Chief Complaint   Patient presents with    Diabetes     3 month follow up    Hypertension    COPD         HPI: Patient has had diabetes for the past few years. She has been compliant with the medications and denies any side effects from it. She has been monitoring fingersticks on a daily basis. Her fingerstick range is between 140-190. She has not had any hypoglycemic symptoms. She has been following a diabetic diet and has been active. Her last eye exam was greater than a year ago. She is using oral meds only. Patient does report diabetic neuropathy pain as well to her lower extremities and feet. She has been taking Lyrica with good response. She is compliant with the medication and does help her to tolerate ADLs. Patient has history of Hypertension. They are taking: lisinopril (Prinivil) and metoprolol (Lopressor, Toprol). Patient is compliant with medications. Today, BP is under control. She does not check Her BP at home. She does monitor Her salt intake. She does not exercise regularly. Patient has had COPD for a long time. She has been using nebulizer inhalation treatments as ordered. She is on oxygen 2L. She has some dyspnea with exertion. With on and off cough, SOB and wheezing that does respond to treatment. She has been using the Albuterol inhaler once a day. She is taking incruse as well with good response. Patient has been complaining of neck and back pain for several years . Pain is 7/10 . Pain is aching. Pain radiates to right leg. Pain is worse with weather, better with rest. Symptoms are getting unchanged . She does not have numbness, tingling  Patient has tried norco with some improvement. Patient denies side effect from medications. The medication does help her to tolerate ADL's.         Patient's medications, allergies, past medical, surgical, social and family histories were reviewed and updated as appropriate. Review of Systems   Constitutional: Negative for chills, fatigue and fever. HENT: Negative for congestion, ear pain, rhinorrhea and sore throat. Eyes: Negative for discharge, redness and itching. Respiratory: Negative for cough and shortness of breath. Cardiovascular: Negative for chest pain, palpitations and leg swelling. Gastrointestinal: Negative for abdominal pain, constipation, diarrhea, nausea and vomiting. Endocrine: Negative for cold intolerance and heat intolerance. Genitourinary: Negative for dysuria. Musculoskeletal: Positive for arthralgias, back pain and neck pain. Negative for joint swelling. Skin: Negative for rash and wound. Neurological: Positive for numbness. Negative for weakness and headaches. Hematological: Negative for adenopathy. Psychiatric/Behavioral: Positive for dysphoric mood. Negative for sleep disturbance. The patient is not nervous/anxious. OBJECTIVE:  /74  Pulse 58  Resp 16  Ht 5' 4\" (1.626 m)  Wt (!) 315 lb 12.8 oz (143.2 kg)  SpO2 93% Comment: room air  BMI 54.21 kg/m2     Wt Readings from Last 2 Encounters:   09/08/17 (!) 315 lb 12.8 oz (143.2 kg)   06/09/17 (!) 313 lb (142 kg)     BP Readings from Last 2 Encounters:   09/08/17 122/74   06/09/17 124/72      Pulse Readings from Last 2 Encounters:   09/08/17 58   06/09/17 68     Body mass index is 54.21 kg/(m^2). SpO2 Readings from Last 2 Encounters:   09/08/17 93%   06/09/17 95%       Physical Exam   Constitutional: She is oriented to person, place, and time. She appears well-developed and well-nourished. HENT:   Head: Normocephalic and atraumatic. Right Ear: External ear normal.   Left Ear: External ear normal.   Mouth/Throat: Oropharynx is clear and moist.   Eyes: Conjunctivae and EOM are normal.   Neck: Neck supple. No JVD present. No thyromegaly present. Cardiovascular: Normal rate, regular rhythm and normal heart sounds.   Exam reveals no gallop and no friction rub. No murmur heard. Pulmonary/Chest: Effort normal and breath sounds normal. No respiratory distress. Abdominal: Soft. Bowel sounds are normal. She exhibits no distension. There is no tenderness. Musculoskeletal: Normal range of motion. She exhibits no edema. Lymphadenopathy:     She has no cervical adenopathy. Neurological: She is alert and oriented to person, place, and time. No cranial nerve deficit. Skin: Skin is warm and dry. Psychiatric: She has a normal mood and affect. No results found for requested labs within last 30 days. Hemoglobin A1C (%)   Date Value   03/09/2017 6.8 (H)     Microscopic Examination (no units)   Date Value   04/13/2015 YES     MICROALBUMIN, RANDOM URINE (mg/dL)   Date Value   03/09/2017 <1.20     LDL Calculated (mg/dL)   Date Value   03/09/2017 43         Lab Results   Component Value Date    WBC 8.4 06/09/2017    NEUTROABS 5.3 09/11/2015    HGB 14.2 06/09/2017    HCT 43.5 06/09/2017    MCV 94.4 06/09/2017     06/09/2017       Lab Results   Component Value Date    TSH 3.93 04/13/2015         ASSESSMENT/PLAN:   Problem List Items Addressed This Visit     Chronic neck and back pain     Patient is to continue taking Norco for this issue as needed. She is compliant with the medication does help her to tolerate ADLs. Will continue to monitor her random urinary drug screens and NITHYA reports. Hypertension     Control on current medication. Patient is to continue lisinopril and metoprolol. Relevant Medications    metoprolol tartrate (LOPRESSOR) 25 MG tablet    Panlobular emphysema ([address]     Patient is on oxygen at 2L. She has seen pulmonology for this issue as well. Breathing is controlled on current medication of incruse. She is also to continue albuterol as needed. There has been some doubt with her pulmonologist in regards to pulmonary hypertension diagnosis. Patient has been advised to stop sildenafil at this time.

## 2017-09-08 NOTE — PATIENT INSTRUCTIONS
dispose of used patches by folding them in half so that the sticky sides meet, and then flushing them down a toilet. They should not be placed in the household trash where children or pets can find them. · If you have any questions, ask your provider or pharmacist for more information. · Be sure to keep all appointments for provider visits or tests. We are committed to providing you with the best care possible. In order to help us achieve these goals please remember to bring all medications, herbal products, and over the counter supplements with you to each visit. If your provider has ordered testing for you, please be sure to follow up with our office if you have not received results within 7 days after the testing took place. *If you receive a survey after visiting one of our offices, please take time to share your experience concerning your physician office visit. These surveys are confidential and no health information about you is shared. We are eager to improve for you and we are counting on your feedback to help make that happen.

## 2017-09-08 NOTE — MR AVS SNAPSHOT
After Visit Summary             Stan Shepard   2017 10:45 AM   Office Visit    Description:  Female : 1965   Provider:  Alison Antunez DO   Department:  [address] [place] [address] Drive and Future Appointments         Below is a list of your follow-up and future appointments. This may not be a complete list as you may have made appointments directly with providers that we are not aware of or your providers may have made some for you. Please call your providers to confirm appointments. It is important to keep your appointments. Please bring your current insurance card, photo ID, co-pay, and all medication bottles to your appointment. If self-pay, payment is expected at the time of service. Your To-Do List     Follow-Up    Return in about 3 months (around 2017) for diabetes, htn, copd, back and neck pain. Information from Your Visit        Department     Name Address Phone Fax    [address] [phone]      You Were Seen for:         Comments    Screening for colon cancer   [[id number]]         Vital Signs     Blood Pressure Pulse Respirations Height Weight Oxygen Saturation    122/74 58 16 5' 4\" (1.626 m) 315 lb 12.8 oz (143.2 kg) 93%    Body Mass Index Smoking Status                54.21 kg/m2 Former Smoker          Additional Information about your Body Mass Index (BMI)           Your BMI as listed above is considered obese (30 or more). BMI is an estimate of body fat, calculated from your height and weight. The higher your BMI, the greater your risk of heart disease, high blood pressure, type 2 diabetes, stroke, gallstones, arthritis, sleep apnea, and certain cancers. BMI is not perfect. It may overestimate body fat in athletes and people who are more muscular.   Even a small weight loss (between 5 and 10 percent of your current weight) by decreasing your calorie intake and your personal information on the empty containers by covering it with black permanent marker or duct tape. Place the sealed container with the mixture, and the empty drug containers, in the trash. · If you use a medication that is in the form of a patch, dispose of used patches by folding them in half so that the sticky sides meet, and then flushing them down a toilet. They should not be placed in the household trash where children or pets can find them. · If you have any questions, ask your provider or pharmacist for more information. · Be sure to keep all appointments for provider visits or tests. We are committed to providing you with the best care possible. In order to help us achieve these goals please remember to bring all medications, herbal products, and over the counter supplements with you to each visit. If your provider has ordered testing for you, please be sure to follow up with our office if you have not received results within 7 days after the testing took place. *If you receive a survey after visiting one of our offices, please take time to share your experience concerning your physician office visit. These surveys are confidential and no health information about you is shared. We are eager to improve for you and we are counting on your feedback to help make that happen. Today's Medication Changes          These changes are accurate as of: 9/8/17 11:58 AM.  If you have any questions, ask your nurse or doctor. CHANGE how you take these medications           glimepiride 4 MG tablet   Commonly known as:  AMARYL   Instructions:  take 1 tablet by mouth every morning BEFORE BREAKFAST   Quantity:  30 tablet   Refills:  2   What changed:  See the new instructions. Changed by: Jane Landeros DO         STOP taking these medications           nystatin 956504 UNIT/ML suspension   Commonly known as:  MYCOSTATIN   Stopped by:   Jane Landeros DO vitamin D 10190 units Caps capsule   Commonly known as:  ERGOCALCIFEROL   Stopped by: Yumi Szymanski, DO            Where to Get Your Medications      These medications were sent to [facility] [address]     Phone:  [phone]     glimepiride 4 MG tablet    metoprolol tartrate 25 MG tablet               Your Current Medications Are              metoprolol tartrate (LOPRESSOR) 25 MG tablet TAKE ONE HALF TABLET BY MOUTH TWO TIMES DAILY    glimepiride (AMARYL) 4 MG tablet take 1 tablet by mouth every morning BEFORE BREAKFAST    LYRICA 75 MG capsule take 1 capsule by mouth twice a day    apixaban (ELIQUIS) 2.5 MG TABS tablet Take 1 tablet by mouth 2 times daily    tiZANidine (ZANAFLEX) 4 MG tablet take 1 tablet by mouth every 8 hours if needed for muscle spasm    ferrous sulfate 325 (65 Fe) MG tablet take 1 tablet by mouth once daily with BREAKFAST    HYDROcodone-acetaminophen (NORCO) 7.5-325 MG per tablet Take 1 tablet by mouth every 8 hours as needed for Pain .     atorvastatin (LIPITOR) 40 MG tablet take 1 tablet by mouth at bedtime    Blood Glucose Monitoring Suppl (ONE TOUCH ULTRA MINI) w/Device KIT 1 kit by Does not apply route 2 times daily Dx e 11.9    lisinopril (PRINIVIL;ZESTRIL) 5 MG tablet take 1 tablet by mouth once daily    umeclidinium-vilanterol (ANORO ELLIPTA) 62.5-25 MCG/INH AEPB inhaler Inhale 1 puff into the lungs daily    bumetanide (BUMEX) 1 MG tablet Take 1 tablet by mouth daily    albuterol sulfate HFA (VENTOLIN HFA) 108 (90 BASE) MCG/ACT inhaler Inhale 2 puffs into the lungs every 6 hours as needed for Wheezing    sildenafil (REVATIO) 20 MG tablet Take 1 tablet by mouth 3 times daily    glucose blood VI test strips (ONE TOUCH TEST STRIPS) strip 1 each by In Vitro route 2 times daily Dx e 11.9    Lancets MISC 1 each by Does not apply route 2 times daily Dx e 11.9 factors born between [facility] at least once (lifetime) who have never been tested. 1965    HIV screening is recommended for all people regardless of risk factors  aged 15-65 years at least once (lifetime) who have never been HIV tested. 4/17/1978    Tetanus Combination Vaccine (1 - Tdap) 4/17/1982    Pneumococcal Vaccine - Pneumovax for adults aged 19-64 years with: chronic heart disease, chronic lung disease, diabetes mellitus, alcoholism, chronic liver disease, or cigarette smoking. (1 of 1 - PPSV23) 4/17/1982    Colonoscopy 4/17/2013    Eye Exam By An Eye Doctor 5/10/2017    Yearly Flu Vaccine (1) 9/1/2017    Pap Smear 8/21/2017    Diabetic Foot Exam 3/9/2018    Hemoglobin A1C (Test For Long-Term Glucose Control) 3/9/2018    Urine Check For Kidney Problems 3/9/2018    Cholesterol Screening 3/9/2018    Mammograms are recommended every 2 years for low/average risk patients aged 48 - 69, and every year for high risk patients per updated national guidelines. However these guidelines can be individualized by your provider. 4/25/2018            [employer] Signup           [employer] allows you to send messages to your doctor, view your test results, renew your prescriptions, schedule appointments, view visit notes, and more. How Do I Sign Up? 1. In your Internet browser, go to [email or web address]  2. Click on the Sign Up Now link in the Sign In box. You will see the New Member Sign Up page. 3. Enter your [employer] Access Code exactly as it appears below. You will not need to use this code after youve completed the sign-up process. If you do not sign up before the expiration date, you must request a new code. [employer] Access Code: [email or web address]  Expires: 11/7/2017 11:58 AM    4. Enter your Social Security Number ([id number]) and Date of Birth (mm/dd/yyyy) as indicated and click Submit. You will be taken to the next sign-up page.

## 2017-09-08 NOTE — PROGRESS NOTES
Have you seen any other physician or provider since your last visit yes - heart doctor    Have you had any other diagnostic tests since your last visit?  yes - echo    Have you changed or stopped any medications since your last visit? no

## 2017-09-14 DIAGNOSIS — M54.9 CHRONIC BACK PAIN, UNSPECIFIED BACK LOCATION, UNSPECIFIED BACK PAIN LATERALITY: ICD-10-CM

## 2017-09-14 DIAGNOSIS — G89.29 CHRONIC BACK PAIN, UNSPECIFIED BACK LOCATION, UNSPECIFIED BACK PAIN LATERALITY: ICD-10-CM

## 2017-09-14 RX ORDER — HYDROCODONE BITARTRATE AND ACETAMINOPHEN 7.5; 325 MG/1; MG/1
1 TABLET ORAL EVERY 8 HOURS PRN
Qty: 90 TABLET | Refills: 0 | Status: SHIPPED | OUTPATIENT
Start: 2017-09-14 | End: 2017-10-12 | Stop reason: SDUPTHER

## 2017-09-21 ENCOUNTER — TELEPHONE (OUTPATIENT)
Dept: PRIMARY CARE CLINIC | Age: 52
End: 2017-09-21

## 2017-09-21 RX ORDER — SILDENAFIL CITRATE 20 MG/1
20 TABLET ORAL 3 TIMES DAILY
Qty: 90 TABLET | Refills: 3 | Status: SHIPPED | OUTPATIENT
Start: 2017-09-21 | End: 2017-10-12 | Stop reason: SDUPTHER

## 2017-09-29 ENCOUNTER — TELEPHONE (OUTPATIENT)
Dept: PRIMARY CARE CLINIC | Age: 52
End: 2017-09-29

## 2017-10-01 ASSESSMENT — ENCOUNTER SYMPTOMS
DIARRHEA: 0
RHINORRHEA: 0
EYE REDNESS: 0
EYE DISCHARGE: 0
BACK PAIN: 1
ABDOMINAL PAIN: 0
NAUSEA: 0
SHORTNESS OF BREATH: 0
SORE THROAT: 0
COUGH: 0
VOMITING: 0
CONSTIPATION: 0
EYE ITCHING: 0

## 2017-10-01 NOTE — ASSESSMENT & PLAN NOTE
Patient is to continue taking Norco for this issue as needed. She is compliant with the medication does help her to tolerate ADLs. Will continue to monitor her random urinary drug screens and NITHYA reports.

## 2017-10-01 NOTE — ASSESSMENT & PLAN NOTE
Patient is on oxygen at 2L. She has seen pulmonology for this issue as well. Breathing is controlled on current medication of incruse. She is also to continue albuterol as needed. There has been some doubt with her pulmonologist in regards to pulmonary hypertension diagnosis. Patient has been advised to stop sildenafil at this time.

## 2017-10-01 NOTE — ASSESSMENT & PLAN NOTE
Controlled on current medication. Patient is to continue Amaryl. She is also to continue Lyrica for diabetic neuropathy. We'll continue to monitor random urinary drug screens and Chaim Ill reports. Will obtain an A1c and microalbumin every few months. Patient is up-to-date on her diabetic foot exam. She is on an ACE inhibitor.

## 2017-10-02 ENCOUNTER — OFFICE VISIT (OUTPATIENT)
Dept: PULMONOLOGY | Facility: CLINIC | Age: 52
End: 2017-10-02

## 2017-10-02 VITALS
WEIGHT: 293 LBS | HEART RATE: 115 BPM | HEIGHT: 64 IN | OXYGEN SATURATION: 90 % | BODY MASS INDEX: 50.02 KG/M2 | RESPIRATION RATE: 18 BRPM | DIASTOLIC BLOOD PRESSURE: 82 MMHG | SYSTOLIC BLOOD PRESSURE: 119 MMHG

## 2017-10-02 DIAGNOSIS — I26.99 OTHER PULMONARY EMBOLISM WITHOUT ACUTE COR PULMONALE, UNSPECIFIED CHRONICITY (HCC): ICD-10-CM

## 2017-10-02 DIAGNOSIS — G47.34 NOCTURNAL HYPOXIA: ICD-10-CM

## 2017-10-02 DIAGNOSIS — I27.20 PULMONARY HYPERTENSION (HCC): ICD-10-CM

## 2017-10-02 DIAGNOSIS — E66.01 MORBID OBESITY, UNSPECIFIED OBESITY TYPE (HCC): ICD-10-CM

## 2017-10-02 DIAGNOSIS — G47.33 OBSTRUCTIVE SLEEP APNEA: ICD-10-CM

## 2017-10-02 DIAGNOSIS — R06.02 SHORTNESS OF BREATH: Primary | ICD-10-CM

## 2017-10-02 DIAGNOSIS — J44.9 CHRONIC OBSTRUCTIVE PULMONARY DISEASE, UNSPECIFIED COPD TYPE (HCC): ICD-10-CM

## 2017-10-02 PROCEDURE — 94620 PR PULMONARY STRESS TESTING,SIMPLE: CPT | Performed by: INTERNAL MEDICINE

## 2017-10-02 PROCEDURE — 99214 OFFICE O/P EST MOD 30 MIN: CPT | Performed by: INTERNAL MEDICINE

## 2017-10-02 RX ORDER — ALBUTEROL SULFATE 90 UG/1
2 AEROSOL, METERED RESPIRATORY (INHALATION) EVERY 4 HOURS PRN
Qty: 1 INHALER | Refills: 5 | Status: SHIPPED | OUTPATIENT
Start: 2017-10-02 | End: 2017-12-05 | Stop reason: SDUPTHER

## 2017-10-02 RX ORDER — ERGOCALCIFEROL 1.25 MG/1
CAPSULE ORAL
Refills: 0 | COMMUNITY
Start: 2017-09-18 | End: 2018-07-02 | Stop reason: SDUPTHER

## 2017-10-02 RX ORDER — BLOOD-GLUCOSE METER
KIT MISCELLANEOUS
Refills: 0 | COMMUNITY
Start: 2017-06-28

## 2017-10-02 RX ORDER — GLIMEPIRIDE 4 MG/1
TABLET ORAL
Refills: 0 | COMMUNITY
Start: 2017-09-08 | End: 2018-07-02 | Stop reason: SDUPTHER

## 2017-10-02 NOTE — PROGRESS NOTES
"Chief Complaint   Patient presents with   • Follow-up   • Shortness of Breath         Subjective   Chante Wetzel is a 52 y.o. female.     History of Present Illness   Patient comes in today to follow-up on COPD, obstructive sleep apnea, DVT/pulmonary embolism and?  Pulmonary hypertension.    The patient was actually evaluated by cardiology and was recommended to have a right heart catheterization but the patient refused.    She says that she accidentally stopped taking her sildenafil and felt that her symptoms of shortness of breath and chest tightness worsened.  She went to her primary care provider who restarted the sildenafil and since then her symptoms have somewhat stabilized.    The patient denies any chest pain or chest tightness during that time.  She also denies any cough or phlegm production at that time.    She is using his CPAP at a pressure of 13 with nasal pillows.  She reports significant improvement in her symptoms of excessive daytime sleepiness and tiredness.      She continues to have limitation in her exercise capacity, mostly because of back pain and hip pain.    The following portions of the patient's history were reviewed and updated as appropriate: allergies, current medications, past family history, past medical history, past social history and past surgical history.    Review of Systems   Constitutional: Negative for chills and fever.   HENT: Negative for sinus pressure, sneezing and sore throat.    Respiratory: Negative for cough, chest tightness, shortness of breath and stridor.    Cardiovascular: Negative for chest pain and leg swelling.   Psychiatric/Behavioral: Negative for sleep disturbance.       Objective   Visit Vitals   • /82   • Pulse 115   • Resp 18   • Ht 64\" (162.6 cm)   • Wt (!) 305 lb (138 kg)   • SpO2 90%   • BMI 52.35 kg/m2       Physical Exam   Constitutional: She is oriented to person, place, and time. She appears well-developed and well-nourished.   HENT: "   Head: Atraumatic.   Crowded oropharynx.  Dentures.    Eyes: EOM are normal.   Neck: Neck supple.   Increased adipose tissue   Cardiovascular: Normal rate and regular rhythm.    Pulmonary/Chest: Effort normal. No respiratory distress.   Somewhat hyperresonant to percussion  Somewhat decreased A/E without wheezing noted.   Musculoskeletal: She exhibits edema (Mild.).   Gait was intact.   Neurological: She is alert and oriented to person, place, and time.   Skin: Skin is warm and dry.   Psychiatric: She has a normal mood and affect.   Vitals reviewed.      Assessment/Plan   Chante was seen today for follow-up and shortness of breath.    Diagnoses and all orders for this visit:    Shortness of breath  -     Converted Six Minute Walk    Obstructive sleep apnea    Pulmonary hypertension  -     NM lung ventilation perfusion aerosol; Future  -     Converted Six Minute Walk    Morbid obesity, unspecified obesity type    Nocturnal hypoxia    Chronic obstructive pulmonary disease, unspecified COPD type    Other pulmonary embolism without acute cor pulmonale, unspecified chronicity  -     NM lung ventilation perfusion aerosol; Future    Other orders  -     albuterol (VENTOLIN HFA) 108 (90 Base) MCG/ACT inhaler; Inhale 2 puffs Every 4 (Four) Hours As Needed for Wheezing or Shortness of Air.         Return in about 8 weeks (around 11/27/2017) for Recheck, Overbook.    DISCUSSION (if any):  I had a very long discussion with the patient and told her that I would strongly urge her to reconsider the possibility of right heart catheterization.  This is definitely warranted since her echocardiogram was done while she was on sildenafil and it did not show any significant pulmonary hypertension.    I told the patient that the echo could underestimate the true pulmonary pressures and some of it could be due to the fact that she was on sildenafil which could have masked the actual number.    I also told her that in order to continue  therapy, we need to identify the type of pulmonary hypertension.  Since she has had 2 episodes of DVT/PE on different occasions, the first step would be to do a VQ scan to rule out chronic thromboembolic pulmonary hypertension.  I also mentioned that the treatment would defer based on the type of pulmonary hypertension.  After much explanation the patient has understood.    If she is extremely resistant to right heart catheterization and one option would be to repeat echocardiogram after discontinuing sildenafil for at least 2-3 days.    I've made no changes to her CPAP, which will continue at a pressure of 13.    She was recommended to continue long-acting muscarinic agents.    Patient was advised to use albuterol based rescue inhaler for when necessary purposes    Patient was also advised to keep a log of the use of rescue inhaler.    Her 600 walk test remains limited because of hip pain and knee pain, although she did have mild hypoxemia with O2 saturation in the 89% range within the first minute.  It did increase to 92% with the addition of 2 L oxygen via nasal cannula.    Converted Six Minute Walk  Date/Time: 10/3/2017 5:28 PM  Performed by: YURIDIA MCCORMACK  Authorized by: YURIDIA MCCORMACK   Comments: 6 minute walk test    Total distance walked: 144 meters  Oxygen levels dropped to 89 % during the walk.  Increased to 91-94% with 2 L/m    Walk test was limited due to hip pain and knee pain            Dictated utilizing Dragon dictation.    This document was electronically signed by Yuridia Mccormack MD October 3, 2017  5:28 PM

## 2017-10-09 ENCOUNTER — HOSPITAL ENCOUNTER (OUTPATIENT)
Dept: NUCLEAR MEDICINE | Facility: HOSPITAL | Age: 52
Discharge: HOME | End: 2017-10-09
Attending: INTERNAL MEDICINE

## 2017-10-09 ENCOUNTER — HOSPITAL ENCOUNTER (OUTPATIENT)
Dept: GENERAL RADIOLOGY | Facility: HOSPITAL | Age: 52
Discharge: HOME | End: 2017-10-09

## 2017-10-09 ENCOUNTER — APPOINTMENT (OUTPATIENT)
Dept: GENERAL RADIOLOGY | Facility: HOSPITAL | Age: 52
End: 2017-10-09

## 2017-10-09 DIAGNOSIS — I26.99 OTHER PULMONARY EMBOLISM WITHOUT ACUTE COR PULMONALE, UNSPECIFIED CHRONICITY (HCC): ICD-10-CM

## 2017-10-09 DIAGNOSIS — I27.20 PULMONARY HYPERTENSION (HCC): ICD-10-CM

## 2017-10-09 PROCEDURE — 71010 HC CHEST PA OR AP: CPT

## 2017-10-09 PROCEDURE — 0 TECHNETIUM TC 99M PENTETATE KIT: Performed by: INTERNAL MEDICINE

## 2017-10-09 PROCEDURE — A9539 TC99M PENTETATE: HCPCS | Performed by: INTERNAL MEDICINE

## 2017-10-09 PROCEDURE — 0 TECHNETIUM ALBUMIN AGGREGATED: Performed by: INTERNAL MEDICINE

## 2017-10-09 PROCEDURE — 78582 LUNG VENTILAT&PERFUS IMAGING: CPT

## 2017-10-09 PROCEDURE — A9540 TC99M MAA: HCPCS | Performed by: INTERNAL MEDICINE

## 2017-10-09 RX ADMIN — KIT FOR THE PREPARATION OF TECHNETIUM TC 99M PENTETATE 1 DOSE: 20 INJECTION, POWDER, LYOPHILIZED, FOR SOLUTION INTRAVENOUS; RESPIRATORY (INHALATION) at 12:44

## 2017-10-09 RX ADMIN — Medication 1 DOSE: at 13:12

## 2017-10-12 ENCOUNTER — OFFICE VISIT (OUTPATIENT)
Dept: SURGERY | Facility: CLINIC | Age: 52
End: 2017-10-12

## 2017-10-12 VITALS
WEIGHT: 293 LBS | SYSTOLIC BLOOD PRESSURE: 124 MMHG | HEART RATE: 91 BPM | TEMPERATURE: 97.4 F | DIASTOLIC BLOOD PRESSURE: 70 MMHG | OXYGEN SATURATION: 88 % | HEIGHT: 64 IN | BODY MASS INDEX: 50.02 KG/M2

## 2017-10-12 DIAGNOSIS — K59.04 CHRONIC IDIOPATHIC CONSTIPATION: Primary | ICD-10-CM

## 2017-10-12 PROCEDURE — 99203 OFFICE O/P NEW LOW 30 MIN: CPT | Performed by: SURGERY

## 2017-10-12 RX ORDER — BISACODYL 5 MG/1
5 TABLET, DELAYED RELEASE ORAL DAILY PRN
Qty: 4 TABLET | Refills: 0 | Status: SHIPPED | OUTPATIENT
Start: 2017-10-12 | End: 2018-08-28

## 2017-10-12 RX ORDER — POLYETHYLENE GLYCOL 3350 17 G/17G
238 POWDER, FOR SOLUTION ORAL ONCE
Qty: 14 PACKET | Refills: 0 | Status: SHIPPED | OUTPATIENT
Start: 2017-10-12 | End: 2017-10-12

## 2017-10-12 NOTE — PROGRESS NOTES
Patient: Chante Wetzel    YOB: 1965    Date: 10/12/2017    Primary Care Provider: Jade Husain DO    Chief complaint:   Chief Complaint   Patient presents with   • Colonoscopy     history of COPD, CHF       Subjective .     History of present illness:  I saw the patient in the office today as a consultation for evaluation for a colonoscopy. Patient has COPD and a hx of heart failure. She states her last colonoscopy was in 2004 at [facility] in [place]. She denies abdominal pain, diarrhea and constipation.Occasional constipation with fatty foods.  No heartburn or reflux symptoms.    Review of Systems   Constitutional: Negative for chills, fever and unexpected weight change.   HENT: Negative for hearing loss, trouble swallowing and voice change.    Eyes: Negative for visual disturbance.   Respiratory: Negative for apnea, cough, chest tightness, shortness of breath and wheezing.    Cardiovascular: Negative for chest pain, palpitations and leg swelling.   Gastrointestinal: Negative for abdominal distention, abdominal pain, anal bleeding, blood in stool, constipation, diarrhea, nausea, rectal pain and vomiting.   Endocrine: Negative for cold intolerance and heat intolerance.   Genitourinary: Negative for difficulty urinating, dysuria and flank pain.   Musculoskeletal: Negative for back pain and gait problem.   Skin: Negative for color change, rash and wound.   Neurological: Negative for dizziness, syncope, speech difficulty, weakness, light-headedness, numbness and headaches.   Hematological: Negative for adenopathy. Does not bruise/bleed easily.   Psychiatric/Behavioral: Negative for confusion. The patient is not nervous/anxious.        History:  Past Medical History:   Diagnosis Date   • Congestive heart failure    • COPD (chronic obstructive pulmonary disease)    • Diabetes mellitus    • H/O blood clots     on chronic anticoagulation therapy.    • Heart murmur    • History of EKG  "01/29/2015    :  EKG:  Sinus rhythm.    • Hypertension    • Kidney failure    • Migraine    • Sleep apnea, obstructive        Past Surgical History:   Procedure Laterality Date   • CYSTOSCOPY URETHRAL DIVERTICULUM REPAIR/EXCISION     • FOOT SURGERY         Family History   Problem Relation Age of Onset   • Cancer Mother    • Heart attack Father    • Diabetes Other    • Heart disease Other        Social History   Substance Use Topics   • Smoking status: Former Smoker     Types: Electronic Cigarette   • Smokeless tobacco: None      Comment: Quit years ago   • Alcohol use No       Allergies:  Allergies   Allergen Reactions   • Erythromycin Rash and Other (See Comments)     \"needles sticking in my chest\"       Medications:    Current Outpatient Prescriptions:   •  albuterol (VENTOLIN HFA) 108 (90 Base) MCG/ACT inhaler, Inhale 2 puffs Every 4 (Four) Hours As Needed for Wheezing or Shortness of Air., Disp: 1 inhaler, Rfl: 5  •  apixaban (ELIQUIS) 2.5 MG tablet tablet, Take 2.5 mg by mouth Every 12 (Twelve) Hours., Disp: , Rfl:   •  atorvastatin (LIPITOR) 40 MG tablet, Take 40 mg by mouth Daily., Disp: , Rfl:   •  bisacodyl (DULCOLAX) 5 MG EC tablet, Take 1 tablet by mouth Daily As Needed for Constipation for up to 1 dose. Indications: Emptying of the Bowel, Disp: 4 tablet, Rfl: 0  •  Blood Glucose Monitoring Suppl (ONE TOUCH ULTRA MINI) w/Device kit, use as directed, Disp: , Rfl: 0  •  bumetanide (BUMEX) 1 MG tablet, take 1 tablet by mouth twice a day, Disp: , Rfl:   •  ferrous sulfate 325 (65 FE) MG tablet, take 1 tablet by mouth once daily, Disp: , Rfl:   •  fluticasone (FLONASE) 50 MCG/ACT nasal spray, 1 spray into each nostril 2 (Two) Times a Day., Disp: 1 each, Rfl: 5  •  glimepiride (AMARYL) 4 MG tablet, take 1 tablet by mouth every morning BEFORE BREAKFAST, Disp: , Rfl: 0  •  HYDROcodone-acetaminophen (NORCO) 7.5-325 MG per tablet, every 8 (eight) hours., Disp: , Rfl:   •  lisinopril (PRINIVIL,ZESTRIL) 5 MG tablet, " take 1 tablet by mouth once daily, Disp: , Rfl:   •  metoprolol tartrate (LOPRESSOR) 25 MG tablet, Take 0.5 tablets by mouth 2 times daily, Disp: , Rfl:   •  polyethylene glycol (MIRALAX) packet, Take 238 g by mouth 1 (One) Time for 1 dose. Indications: Colonoscopy, Disp: 14 packet, Rfl: 0  •  pregabalin (LYRICA) 75 MG capsule, Take 75 mg by mouth 2 (Two) Times a Day., Disp: , Rfl:   •  sildenafil (REVATIO) 20 MG tablet, Take 1 tablet by mouth 3 times daily, Disp: , Rfl:   •  tiZANidine (ZANAFLEX) 4 MG tablet, Take 4 mg by mouth At Night As Needed for Muscle Spasms., Disp: , Rfl:   •  triamcinolone (KENALOG) 0.025 % cream, , Disp: , Rfl:   •  Umeclidinium Bromide 62.5 MCG/INH aerosol powder , Inhale 1 puff Daily., Disp: 1 each, Rfl: 5  •  vitamin D (ERGOCALCIFEROL) 08224 units capsule capsule, , Disp: , Rfl: 0  •  VITAMIN K PO, Take  by mouth 1 (One) Time Per Week., Disp: , Rfl:     Objective     Vital Signs:   Temp:  [97.4 °F (36.3 °C)] 97.4 °F (36.3 °C)  Heart Rate:  [91] 91  BP: (124)/(70) 124/70    Physical Exam:   General Appearance:    Alert, cooperative, in no acute distress   Head:    Normocephalic, without obvious abnormality, atraumatic   Eyes:            Lids and lashes normal, conjunctivae and sclerae normal, no   icterus, no pallor, corneas clear, PERRL   Ears:    Ears appear intact with no abnormalities noted   Throat:   No oral lesions, no thrush, oral mucosa moist   Neck:   No adenopathy, supple, trachea midline, no thyromegaly,  no JVD   Lungs:     Clear to auscultation,respirations regular, even and                  unlabored    Heart:    Regular rhythm and normal rate, normal S1 and S2, no            murmur   Abdomen:     no masses, no organomegaly, soft non-tender, non-distended, no guarding, there is no evidence of tenderness   Extremities:   Moves all extremities well, no edema, no cyanosis, no             redness   Pulses:   Pulses palpable and equal bilaterally   Skin:   No bleeding, bruising  or rash   Lymph nodes:   No palpable adenopathy   Neurologic:   Cranial nerves 2 - 12 grossly intact, sensation intact      Results Review:   I reviewed the patient's new clinical results.    Review of Systems was reviewed and confirmed as accurate today.    Assessment/Plan :    1. Chronic idiopathic constipation        I recommend a colonoscopy for further evaluation. The procedure was explained as well as the risks which include but are not limited to bleeding, infection, perforation, abdominal pain etc. The patient understands these risks and the procedure and wishes to proceed.     Electronically signed by Joseph Velásquez MD  10/12/17 10:59 AM      Scribed for Joseph Velásquez MD by Bibiana Caldera. 10/12/2017  12:20 PM

## 2017-10-27 RX ORDER — ERGOCALCIFEROL 1.25 MG/1
CAPSULE ORAL
Qty: 8 CAPSULE | Refills: 0 | Status: SHIPPED | OUTPATIENT
Start: 2017-10-27 | End: 2018-03-09 | Stop reason: SDUPTHER

## 2017-10-29 DIAGNOSIS — G89.29 CHRONIC NECK AND BACK PAIN: ICD-10-CM

## 2017-10-29 DIAGNOSIS — M54.9 CHRONIC NECK AND BACK PAIN: ICD-10-CM

## 2017-10-29 DIAGNOSIS — M54.2 CHRONIC NECK AND BACK PAIN: ICD-10-CM

## 2017-10-30 RX ORDER — TIZANIDINE 4 MG/1
TABLET ORAL
Qty: 90 TABLET | Refills: 1 | Status: SHIPPED | OUTPATIENT
Start: 2017-10-30 | End: 2017-12-08 | Stop reason: SDUPTHER

## 2017-10-31 ENCOUNTER — TELEPHONE (OUTPATIENT)
Dept: PRIMARY CARE CLINIC | Age: 52
End: 2017-10-31

## 2017-11-02 ENCOUNTER — TELEPHONE (OUTPATIENT)
Dept: PRIMARY CARE CLINIC | Age: 52
End: 2017-11-02

## 2017-11-02 NOTE — TELEPHONE ENCOUNTER
Pt states she is scheduled for colonoscopy 11/9/17 and was instructed by  to stop any blood thinners 3-5 days prior. She is wanting to make sure that is ok with you and what day she should stop taking it.

## 2017-11-06 RX ORDER — BLOOD-GLUCOSE METER
KIT MISCELLANEOUS
Qty: 1 KIT | Refills: 0 | Status: SHIPPED | OUTPATIENT
Start: 2017-11-06

## 2017-11-09 ENCOUNTER — OUTSIDE FACILITY SERVICE (OUTPATIENT)
Dept: SURGERY | Facility: CLINIC | Age: 52
End: 2017-11-09

## 2017-11-09 DIAGNOSIS — G89.29 CHRONIC BACK PAIN, UNSPECIFIED BACK LOCATION, UNSPECIFIED BACK PAIN LATERALITY: ICD-10-CM

## 2017-11-09 DIAGNOSIS — M54.9 CHRONIC BACK PAIN, UNSPECIFIED BACK LOCATION, UNSPECIFIED BACK PAIN LATERALITY: ICD-10-CM

## 2017-11-09 PROCEDURE — 45380 COLONOSCOPY AND BIOPSY: CPT | Performed by: SURGERY

## 2017-11-09 RX ORDER — HYDROCODONE BITARTRATE AND ACETAMINOPHEN 7.5; 325 MG/1; MG/1
1 TABLET ORAL EVERY 8 HOURS PRN
Qty: 90 TABLET | Refills: 0 | Status: SHIPPED | OUTPATIENT
Start: 2017-11-09 | End: 2017-12-08 | Stop reason: SDUPTHER

## 2017-11-10 ENCOUNTER — TELEPHONE (OUTPATIENT)
Dept: PRIMARY CARE CLINIC | Age: 52
End: 2017-11-10

## 2017-11-10 DIAGNOSIS — E11.65 TYPE 2 DIABETES MELLITUS WITH HYPERGLYCEMIA, WITHOUT LONG-TERM CURRENT USE OF INSULIN (HCC): ICD-10-CM

## 2017-11-12 RX ORDER — LANCETS 30 GAUGE
1 EACH MISCELLANEOUS 2 TIMES DAILY
Qty: 100 EACH | Refills: 3 | Status: SHIPPED | OUTPATIENT
Start: 2017-11-12 | End: 2018-11-27 | Stop reason: SDUPTHER

## 2017-11-23 DIAGNOSIS — E11.40 TYPE 2 DIABETES MELLITUS WITH DIABETIC NEUROPATHY, WITHOUT LONG-TERM CURRENT USE OF INSULIN (HCC): ICD-10-CM

## 2017-11-23 DIAGNOSIS — I10 ESSENTIAL HYPERTENSION: ICD-10-CM

## 2017-11-27 RX ORDER — LISINOPRIL 5 MG/1
TABLET ORAL
Qty: 30 TABLET | Refills: 3 | Status: SHIPPED | OUTPATIENT
Start: 2017-11-27 | End: 2018-03-31 | Stop reason: SDUPTHER

## 2017-12-05 ENCOUNTER — OFFICE VISIT (OUTPATIENT)
Dept: PULMONOLOGY | Facility: CLINIC | Age: 52
End: 2017-12-05

## 2017-12-05 VITALS
DIASTOLIC BLOOD PRESSURE: 88 MMHG | SYSTOLIC BLOOD PRESSURE: 130 MMHG | HEART RATE: 67 BPM | HEIGHT: 64 IN | OXYGEN SATURATION: 92 % | BODY MASS INDEX: 50.02 KG/M2 | WEIGHT: 293 LBS

## 2017-12-05 DIAGNOSIS — E66.01 MORBID OBESITY, UNSPECIFIED OBESITY TYPE (HCC): ICD-10-CM

## 2017-12-05 DIAGNOSIS — E11.40 TYPE 2 DIABETES MELLITUS WITH DIABETIC NEUROPATHY, WITHOUT LONG-TERM CURRENT USE OF INSULIN (HCC): ICD-10-CM

## 2017-12-05 DIAGNOSIS — G47.34 NOCTURNAL HYPOXIA: ICD-10-CM

## 2017-12-05 DIAGNOSIS — J44.9 CHRONIC OBSTRUCTIVE PULMONARY DISEASE, UNSPECIFIED COPD TYPE (HCC): ICD-10-CM

## 2017-12-05 DIAGNOSIS — G47.33 OBSTRUCTIVE SLEEP APNEA: ICD-10-CM

## 2017-12-05 DIAGNOSIS — I27.20 PULMONARY HYPERTENSION (HCC): ICD-10-CM

## 2017-12-05 DIAGNOSIS — R06.02 SHORTNESS OF BREATH: Primary | ICD-10-CM

## 2017-12-05 DIAGNOSIS — I26.99 OTHER PULMONARY EMBOLISM WITHOUT ACUTE COR PULMONALE, UNSPECIFIED CHRONICITY (HCC): ICD-10-CM

## 2017-12-05 PROCEDURE — 99214 OFFICE O/P EST MOD 30 MIN: CPT | Performed by: INTERNAL MEDICINE

## 2017-12-05 RX ORDER — ALBUTEROL SULFATE 90 UG/1
2 AEROSOL, METERED RESPIRATORY (INHALATION) EVERY 4 HOURS PRN
Qty: 1 INHALER | Refills: 5 | Status: SHIPPED | OUTPATIENT
Start: 2017-12-05 | End: 2018-08-28 | Stop reason: ALTCHOICE

## 2017-12-05 RX ORDER — GLIMEPIRIDE 4 MG/1
TABLET ORAL
Qty: 30 TABLET | Refills: 2 | Status: SHIPPED | OUTPATIENT
Start: 2017-12-05 | End: 2017-12-08 | Stop reason: SDUPTHER

## 2017-12-05 RX ORDER — ATORVASTATIN CALCIUM 40 MG/1
TABLET, FILM COATED ORAL
Qty: 30 TABLET | Refills: 3 | Status: SHIPPED | OUTPATIENT
Start: 2017-12-05 | End: 2017-12-08 | Stop reason: SDUPTHER

## 2017-12-05 NOTE — PROGRESS NOTES
"Chief Complaint   Patient presents with   • Follow-up   • Shortness of Breath         Subjective   Chante Wetzel is a 52 y.o. female.     History of Present Illness    Patient comes in today follow-up on obstructive sleep apnea, shortness of breath, possible pulmonary hypertension and COPD.    She continues to be on anticoagulation for underlying 2 separate thromboembolic events.    She using her nasal pillows on a regular basis and although she feels rested, she's having some issues with excessive dryness.    Her exercise capacity remained limited because of back pain but she describes no worsening in her symptoms over the past few weeks.    She is using her respiratory medications regularly.  She also uses Flonase once a day.  She is using oxygen at night with the CPAP device.    The following portions of the patient's history were reviewed and updated as appropriate: allergies, current medications, past family history, past medical history, past social history and past surgical history.    Review of Systems   Respiratory: Positive for shortness of breath. Negative for cough and wheezing.    Cardiovascular: Negative for chest pain and leg swelling.       Objective   Visit Vitals   • /88   • Pulse 67   • Ht 162.6 cm (64.02\")   • Wt (!) 144 kg (318 lb)   • SpO2 92%   • BMI 54.56 kg/m2       Physical Exam   Constitutional: She is oriented to person, place, and time. She appears well-developed and well-nourished.   HENT:   Head: Atraumatic.   Crowded oropharynx.  Dentures.    Eyes: EOM are normal.   Neck: Neck supple.   Increased adipose tissue   Cardiovascular: Normal rate and regular rhythm.    Pulmonary/Chest: Effort normal. No respiratory distress.   Somewhat hyperresonant to percussion  Somewhat decreased A/E without wheezing noted.   Musculoskeletal: She exhibits edema (Mild.).   Gait was intact.   Neurological: She is alert and oriented to person, place, and time.   Skin: Skin is warm and dry. "   Psychiatric: She has a normal mood and affect.   Vitals reviewed.      Assessment/Plan   Chante was seen today for follow-up and shortness of breath.    Diagnoses and all orders for this visit:    Shortness of breath  -     Adult Transthoracic Echo Complete W/ Cont if Necessary Per Protocol; Future  -     Converted Six Minute Walk; Future    Obstructive sleep apnea  -     Ambulatory Referral to Bariatric Surgery    Pulmonary hypertension  -     Adult Transthoracic Echo Complete W/ Cont if Necessary Per Protocol; Future  -     Converted Six Minute Walk; Future    Morbid obesity, unspecified obesity type  -     Ambulatory Referral to Bariatric Surgery    Nocturnal hypoxia    Chronic obstructive pulmonary disease, unspecified COPD type  -     Converted Six Minute Walk; Future    Other pulmonary embolism without acute cor pulmonale, unspecified chronicity    Other orders  -     albuterol (VENTOLIN HFA) 108 (90 Base) MCG/ACT inhaler; Inhale 2 puffs Every 4 (Four) Hours As Needed for Wheezing or Shortness of Air         Return in about 3 months (around 3/5/2018) for Recheck, 6MWT on F/U, Echo results.    DISCUSSION (if any):  I reviewed her VQ scan results with her and told her that it did not show any significant probability of pulmonary embolism.    I once again told the patient about the dilemma of possible pulmonary hypertension given her refusal to undergo right heart catheterization and last echocardiogram not revealing any significant elevation of right-sided pressures. I once again reiterated to the patient that echocardiogram could be false negative due to the fact that she was on sildenafil at the time.  Unfortunately her symptoms returned only after 5 days of being off the sildenafil and it was restarted by her primary care provider.    After much debate the patient has agreed to repeat echocardiogram in February,off sildenafil for 3-5 days.     She continues to refuse right heart catheterization.    As far  as sleep apnea is concerned, I have advised her to continue CPAP at the current pressure of 13 with nasal pillows.    She seems to have issues with excessive dryness and initially I will ask her DME company to make sure that her humidifier is working appropriately.  She may also benefit from education about the use of humidifier.    6 minute walk test will be done upon follow-up visit    Due o morbid obesity,  I have recommended bariatric surgery consultation and she has agreed.    I recommended her to continue  Her current medications including long-acting muscarinic agent and Ventolin.          Dictated utilizing Dragon dictation.    This document was electronically signed by Yuridia Mccormack MD December 5, 2017  2:40 PM

## 2017-12-08 ENCOUNTER — OFFICE VISIT (OUTPATIENT)
Dept: PRIMARY CARE CLINIC | Age: 52
End: 2017-12-08
Payer: MEDICAID

## 2017-12-08 VITALS
BODY MASS INDEX: 50.02 KG/M2 | HEART RATE: 71 BPM | RESPIRATION RATE: 20 BRPM | OXYGEN SATURATION: 97 % | SYSTOLIC BLOOD PRESSURE: 124 MMHG | HEIGHT: 64 IN | WEIGHT: 293 LBS | DIASTOLIC BLOOD PRESSURE: 70 MMHG

## 2017-12-08 DIAGNOSIS — Z23 NEEDS FLU SHOT: ICD-10-CM

## 2017-12-08 DIAGNOSIS — R09.81 NASAL CONGESTION: ICD-10-CM

## 2017-12-08 DIAGNOSIS — G89.29 CHRONIC NECK AND BACK PAIN: ICD-10-CM

## 2017-12-08 DIAGNOSIS — M54.9 CHRONIC NECK AND BACK PAIN: ICD-10-CM

## 2017-12-08 DIAGNOSIS — J43.1 PANLOBULAR EMPHYSEMA (HCC): ICD-10-CM

## 2017-12-08 DIAGNOSIS — M54.2 CHRONIC NECK AND BACK PAIN: ICD-10-CM

## 2017-12-08 DIAGNOSIS — E11.40 TYPE 2 DIABETES MELLITUS WITH DIABETIC NEUROPATHY, WITHOUT LONG-TERM CURRENT USE OF INSULIN (HCC): Primary | ICD-10-CM

## 2017-12-08 DIAGNOSIS — I10 ESSENTIAL HYPERTENSION: ICD-10-CM

## 2017-12-08 PROCEDURE — 99214 OFFICE O/P EST MOD 30 MIN: CPT | Performed by: FAMILY MEDICINE

## 2017-12-08 PROCEDURE — 90688 IIV4 VACCINE SPLT 0.5 ML IM: CPT | Performed by: FAMILY MEDICINE

## 2017-12-08 PROCEDURE — 90471 IMMUNIZATION ADMIN: CPT | Performed by: FAMILY MEDICINE

## 2017-12-08 RX ORDER — PREGABALIN 75 MG/1
CAPSULE ORAL
Qty: 60 CAPSULE | Refills: 2 | Status: SHIPPED | OUTPATIENT
Start: 2017-12-08 | End: 2018-03-05 | Stop reason: SDUPTHER

## 2017-12-08 RX ORDER — HYDROCODONE BITARTRATE AND ACETAMINOPHEN 7.5; 325 MG/1; MG/1
1 TABLET ORAL EVERY 8 HOURS PRN
Qty: 90 TABLET | Refills: 0 | Status: SHIPPED | OUTPATIENT
Start: 2017-12-08 | End: 2018-01-02 | Stop reason: SDUPTHER

## 2017-12-08 RX ORDER — ATORVASTATIN CALCIUM 40 MG/1
TABLET, FILM COATED ORAL
Qty: 30 TABLET | Refills: 3 | Status: SHIPPED | OUTPATIENT
Start: 2017-12-08 | End: 2018-04-04 | Stop reason: SDUPTHER

## 2017-12-08 RX ORDER — TIZANIDINE 4 MG/1
TABLET ORAL
Qty: 90 TABLET | Refills: 1 | Status: SHIPPED | OUTPATIENT
Start: 2017-12-08 | End: 2018-02-22 | Stop reason: SDUPTHER

## 2017-12-08 RX ORDER — GLIMEPIRIDE 4 MG/1
TABLET ORAL
Qty: 30 TABLET | Refills: 3 | Status: SHIPPED | OUTPATIENT
Start: 2017-12-08 | End: 2018-03-05 | Stop reason: SDUPTHER

## 2017-12-08 RX ORDER — METFORMIN HYDROCHLORIDE 500 MG/1
500 TABLET, EXTENDED RELEASE ORAL
Qty: 30 TABLET | Refills: 3 | Status: SHIPPED | OUTPATIENT
Start: 2017-12-08 | End: 2018-03-31 | Stop reason: SDUPTHER

## 2017-12-08 RX ORDER — FERROUS SULFATE 325(65) MG
TABLET ORAL
Qty: 30 TABLET | Refills: 3 | Status: SHIPPED | OUTPATIENT
Start: 2017-12-08 | End: 2018-03-31 | Stop reason: SDUPTHER

## 2017-12-08 RX ORDER — BUMETANIDE 1 MG/1
1 TABLET ORAL DAILY
Qty: 60 TABLET | Refills: 3 | Status: SHIPPED | OUTPATIENT
Start: 2017-12-08 | End: 2018-08-13 | Stop reason: SDUPTHER

## 2017-12-08 ASSESSMENT — ENCOUNTER SYMPTOMS
EYE REDNESS: 0
ABDOMINAL PAIN: 0
VOMITING: 0
EYE DISCHARGE: 0
BACK PAIN: 1
DIARRHEA: 0
CONSTIPATION: 0
NAUSEA: 0
SHORTNESS OF BREATH: 0
COUGH: 0
SORE THROAT: 0
RHINORRHEA: 0
EYE ITCHING: 0

## 2017-12-08 NOTE — PROGRESS NOTES
After obtaining consent, and per orders of Dr. Rinku Wheatley, injection of flu given in Right arm by Zonia Cannon. Patient instructed to remain in clinic for 20 minutes afterwards, and to report any adverse reaction to me immediately.

## 2017-12-08 NOTE — PROGRESS NOTES
SUBJECTIVE:    Patient ID: Paolo Gonzalez is a 45 y.o. female. Chief Complaint   Patient presents with    Diabetes     f/u    Hypertension     f/u    COPD     f/u    Back Pain     f/u    Neck Pain     f/u    Head Congestion     sneezing         HPI: Patient has had diabetes for the past few years. She has been compliant with the medications and denies any side effects from it. She has been monitoring fingersticks on a daily basis. Her fingerstick range is between 160-220. She has not had any hypoglycemic symptoms. She has been following a diabetic diet and has been active. Her last eye exam was greater than a year ago. She is using oral meds only with amaryl. Patient does report diabetic neuropathy pain as well to her lower extremities and feet. She has been taking Lyrica with good response. She is compliant with the medication and does help her to tolerate ADLs. Patient has history of Hypertension. They are taking: lisinopril (Prinivil) and metoprolol (Lopressor, Toprol). Patient is compliant with medications. Today, BP is under control. She does not check Her BP at home. She does monitor Her salt intake. She does not exercise regularly. Patient has had COPD for a long time. She has been using nebulizer inhalation treatments as ordered. She is on oxygen 2L. She has some dyspnea with exertion. With on and off cough, SOB and wheezing that does respond to treatment. She has been using the Albuterol inhaler once a day. She is taking incruse as well with good response. She does see pulmonology regularly. Patient has been complaining of neck and back pain for several years . Pain is 6/10 . Pain is aching. Pain radiates to right leg from back. Pain is worse with weather, better with rest. Symptoms are getting unchanged . She does not have numbness, tingling  Patient has tried norco with some improvement. Patient denies side effect from medications.  The medication does help her to tolerate ADL's. Patient reports nasal congestion. She reports sneezing the last few mornings. Denies any nasal drainage or itchy, watery eyes. This has pretty much resolved. Patient's medications, allergies, past medical, surgical, social and family histories were reviewed and updated as appropriate. Review of Systems   Constitutional: Negative for chills, fatigue and fever. HENT: Positive for congestion. Negative for ear pain, rhinorrhea and sore throat. Eyes: Negative for discharge, redness and itching. Respiratory: Negative for cough and shortness of breath. Cardiovascular: Negative for chest pain, palpitations and leg swelling. Gastrointestinal: Negative for abdominal pain, constipation, diarrhea, nausea and vomiting. Endocrine: Negative for cold intolerance and heat intolerance. Genitourinary: Negative for dysuria. Musculoskeletal: Positive for arthralgias, back pain and neck pain. Negative for joint swelling. Skin: Negative for rash and wound. Neurological: Positive for numbness. Negative for weakness and headaches. Hematological: Negative for adenopathy. Psychiatric/Behavioral: Negative for dysphoric mood and sleep disturbance. The patient is not nervous/anxious. OBJECTIVE:  /70 (Site: Right Arm, Position: Sitting)   Pulse 71   Resp 20   Ht 5' 4\" (1.626 m)   Wt (!) 318 lb (144.2 kg)   SpO2 97% Comment: room air  BMI 54.58 kg/m²      Wt Readings from Last 2 Encounters:   12/08/17 (!) 318 lb (144.2 kg)   09/08/17 (!) 315 lb 12.8 oz (143.2 kg)     BP Readings from Last 2 Encounters:   12/08/17 124/70   11/09/17 (!) 97/47      Pulse Readings from Last 2 Encounters:   12/08/17 71   11/09/17 67     Body mass index is 54.58 kg/m². SpO2 Readings from Last 2 Encounters:   09/08/17 93%   06/09/17 95%       Physical Exam   Constitutional: She is oriented to person, place, and time. She appears well-developed and well-nourished.    HENT:   Head: Normocephalic and atraumatic. Right Ear: External ear normal.   Left Ear: External ear normal.   Mouth/Throat: Oropharynx is clear and moist.   Eyes: Conjunctivae and EOM are normal.   Neck: Neck supple. No JVD present. No thyromegaly present. Cardiovascular: Normal rate, regular rhythm and normal heart sounds. Exam reveals no gallop and no friction rub. No murmur heard. Pulmonary/Chest: Effort normal and breath sounds normal. No respiratory distress. Abdominal: Soft. Bowel sounds are normal. She exhibits no distension. There is no tenderness. Musculoskeletal: She exhibits tenderness (to cervical spine and lumbar spine. ). She exhibits no edema. Abnormal gait   Lymphadenopathy:     She has no cervical adenopathy. Neurological: She is alert and oriented to person, place, and time. No cranial nerve deficit. Skin: Skin is warm and dry. Psychiatric: She has a normal mood and affect.        Results in Past 30 Days  Result Component Current Result Ref Range Previous Result Ref Range   Alb 3.8 (12/14/2017) 3.4 - 4.8 g/dL Not in Time Range    Albumin/Globulin Ratio 1.1 (12/14/2017) 0.8 - 2.0 Not in Time Range    Alkaline Phosphatase 106 (H) (12/14/2017) 25 - 100 U/L Not in Time Range    ALT 17 (12/14/2017) 4 - 36 U/L Not in Time Range    AST 15 (12/14/2017) 8 - 33 U/L Not in Time Range    BUN 17 (12/14/2017) 6 - 20 mg/dL Not in Time Range    Calcium 9.8 (12/14/2017) 8.5 - 10.5 mg/dL Not in Time Range    Chloride 95 (L) (12/14/2017) 98 - 107 mmol/L Not in Time Range    CO2 30 (12/14/2017) 20 - 30 mmol/L Not in Time Range    CREATININE 1.0 (12/14/2017) 0.4 - 1.2 mg/dL Not in Time Range    GFR  >59 (12/14/2017) >59 Not in Time Range    GFR Non- 58 (L) (12/14/2017) >59 Not in Time Range    Globulin 3.4 (12/14/2017) g/dL Not in Time Range    Glucose 173 (H) (12/14/2017) 74 - 106 mg/dL Not in Time Range    Potassium 4.3 (12/14/2017) 3.4 - 5.1 mmol/L Not in Time Range    Sodium 139 (12/14/2017) 136 metformin as she was on this before with good response. Patient is to continue Amaryl. She is also to continue Lyrica for diabetic neuropathy. We'll continue to monitor random urinary drug screens and Edrie Ezio reports. Will obtain an A1c and microalbumin every few months. Patient is up-to-date on her diabetic foot exam. She is on an ACE inhibitor. Relevant Medications    glimepiride (AMARYL) 4 MG tablet    atorvastatin (LIPITOR) 40 MG tablet    metFORMIN (GLUCOPHAGE-XR) 500 MG extended release tablet    Other Relevant Orders    Comprehensive Metabolic Panel (Completed)    Hemoglobin A1C (Completed)    Nasal congestion     Patient encouraged to use Flonase. Other Visit Diagnoses     Needs flu shot        Relevant Orders    INFLUENZA, QUADV, 3 YRS AND OLDER, IM, MDV, 0.5ML (FLUZONE QUADV) (Completed)        Controlled Substances Monitoring:     Attestation: The Prescription Monitoring Report for this patient was reviewed today. Shanda Acosta DO)  Documentation: Possible medication side effects, risk of tolerance and/or dependence, and alternative treatments discussed., Obtaining appropriate analgesic effect of treatment., No signs of potential drug abuse or diversion identified. Shanda Acosta DO)  Medication Contracts: Existing medication contract. Shanda Acosta DO)      Return in about 3 months (around 3/8/2018) for back and neck pain, diabetes, HTN.

## 2017-12-14 ENCOUNTER — TELEPHONE (OUTPATIENT)
Dept: PRIMARY CARE CLINIC | Age: 52
End: 2017-12-14

## 2017-12-14 ENCOUNTER — HOSPITAL ENCOUNTER (OUTPATIENT)
Dept: OTHER | Age: 52
Discharge: HOME | End: 2017-12-14
Attending: FAMILY MEDICINE | Admitting: FAMILY MEDICINE

## 2017-12-14 DIAGNOSIS — E11.40 TYPE 2 DIABETES MELLITUS WITH DIABETIC NEUROPATHY, WITHOUT LONG-TERM CURRENT USE OF INSULIN (HCC): ICD-10-CM

## 2017-12-14 DIAGNOSIS — I10 ESSENTIAL HYPERTENSION: ICD-10-CM

## 2017-12-14 LAB
A/G RATIO: 1.1 (ref 0.8–2)
ALBUMIN SERPL-MCNC: 3.8 G/DL (ref 3.4–4.8)
ALP BLD-CCNC: 106 U/L (ref 25–100)
ALT SERPL-CCNC: 17 U/L (ref 4–36)
ANION GAP SERPL CALCULATED.3IONS-SCNC: 14 MMOL/L (ref 3–16)
AST SERPL-CCNC: 15 U/L (ref 8–33)
BILIRUB SERPL-MCNC: 1 MG/DL (ref 0.3–1.2)
BUN BLDV-MCNC: 17 MG/DL (ref 6–20)
CALCIUM SERPL-MCNC: 9.8 MG/DL (ref 8.5–10.5)
CHLORIDE BLD-SCNC: 95 MMOL/L (ref 98–107)
CO2: 30 MMOL/L (ref 20–30)
CREAT SERPL-MCNC: 1 MG/DL (ref 0.4–1.2)
GFR AFRICAN AMERICAN: >59
GFR NON-AFRICAN AMERICAN: 58
GLOBULIN: 3.4 G/DL
GLUCOSE BLD-MCNC: 173 MG/DL (ref 74–106)
HBA1C MFR BLD: 8.8 %
POTASSIUM SERPL-SCNC: 4.3 MMOL/L (ref 3.4–5.1)
SODIUM BLD-SCNC: 139 MMOL/L (ref 136–145)
TOTAL PROTEIN: 7.2 G/DL (ref 6.4–8.3)

## 2017-12-14 NOTE — TELEPHONE ENCOUNTER
Patient came to the office today for more samples of eliquis 2.5 mg. This was prescribed by Dr. Zaria Taylor. Patient was given 4 box/boxes. Qty. 56, Lot # [id number], Exp. Date 12/2018.

## 2018-01-01 PROBLEM — R09.81 NASAL CONGESTION: Status: ACTIVE | Noted: 2018-01-01

## 2018-01-02 ENCOUNTER — TELEPHONE (OUTPATIENT)
Dept: PRIMARY CARE CLINIC | Age: 53
End: 2018-01-02

## 2018-01-05 RX ORDER — HYDROCODONE BITARTRATE AND ACETAMINOPHEN 7.5; 325 MG/1; MG/1
1 TABLET ORAL EVERY 8 HOURS PRN
Qty: 90 TABLET | Refills: 0 | Status: SHIPPED | OUTPATIENT
Start: 2018-01-07 | End: 2018-02-05 | Stop reason: SDUPTHER

## 2018-01-11 ENCOUNTER — TELEPHONE (OUTPATIENT)
Dept: PRIMARY CARE CLINIC | Age: 53
End: 2018-01-11

## 2018-01-30 ENCOUNTER — HOSPITAL ENCOUNTER (OUTPATIENT)
Dept: CARDIOLOGY | Facility: HOSPITAL | Age: 53
Discharge: HOME | End: 2018-01-30
Attending: INTERNAL MEDICINE | Admitting: INTERNAL MEDICINE

## 2018-01-30 DIAGNOSIS — R06.02 SHORTNESS OF BREATH: ICD-10-CM

## 2018-01-30 DIAGNOSIS — I27.20 PULMONARY HYPERTENSION (HCC): ICD-10-CM

## 2018-01-30 LAB
BH CV ECHO MEAS - EF(MOD-SP4): 60 %
LV EF 2D ECHO EST: 60 %
MAXIMAL PREDICTED HEART RATE: 166 BPM
STRESS TARGET HR: 141 BPM

## 2018-01-30 PROCEDURE — 93306 TTE W/DOPPLER COMPLETE: CPT | Performed by: INTERNAL MEDICINE

## 2018-01-30 PROCEDURE — 93306 TTE W/DOPPLER COMPLETE: CPT

## 2018-01-31 ENCOUNTER — TELEPHONE (OUTPATIENT)
Dept: PRIMARY CARE CLINIC | Age: 53
End: 2018-01-31

## 2018-01-31 NOTE — TELEPHONE ENCOUNTER
Dr. Elidia Kaur took patient of viagra and she has been off for 5 days and is doing well. She wanted to know if that was ok with you or did you want to start back. Patient had echo yesterday also.

## 2018-02-02 RX ORDER — UMECLIDINIUM 62.5 UG/1
1 AEROSOL, POWDER ORAL DAILY
Qty: 30 EACH | Refills: 2 | Status: SHIPPED | OUTPATIENT
Start: 2018-02-02 | End: 2018-12-04 | Stop reason: ALTCHOICE

## 2018-02-06 ENCOUNTER — TELEPHONE (OUTPATIENT)
Dept: PRIMARY CARE CLINIC | Age: 53
End: 2018-02-06

## 2018-02-06 RX ORDER — HYDROCODONE BITARTRATE AND ACETAMINOPHEN 7.5; 325 MG/1; MG/1
1 TABLET ORAL EVERY 8 HOURS PRN
Qty: 90 TABLET | Refills: 0 | Status: SHIPPED | OUTPATIENT
Start: 2018-02-06 | End: 2018-03-05 | Stop reason: SDUPTHER

## 2018-02-06 NOTE — TELEPHONE ENCOUNTER
Patient came to the office today for more samples of Eliquis 2.5 mg. This was prescribed by Dr. Silvia Sanders. Patient was given 5 box/boxes. Qty. 70, Lot # [id number], Exp. Date 09/2018.

## 2018-02-22 DIAGNOSIS — M54.9 CHRONIC NECK AND BACK PAIN: ICD-10-CM

## 2018-02-22 DIAGNOSIS — G89.29 CHRONIC NECK AND BACK PAIN: ICD-10-CM

## 2018-02-22 DIAGNOSIS — M54.2 CHRONIC NECK AND BACK PAIN: ICD-10-CM

## 2018-02-23 RX ORDER — TIZANIDINE 4 MG/1
TABLET ORAL
Qty: 90 TABLET | Refills: 1 | Status: SHIPPED | OUTPATIENT
Start: 2018-02-23 | End: 2018-04-26 | Stop reason: SDUPTHER

## 2018-03-05 DIAGNOSIS — E11.40 TYPE 2 DIABETES MELLITUS WITH DIABETIC NEUROPATHY, WITHOUT LONG-TERM CURRENT USE OF INSULIN (HCC): ICD-10-CM

## 2018-03-05 RX ORDER — HYDROCODONE BITARTRATE AND ACETAMINOPHEN 7.5; 325 MG/1; MG/1
1 TABLET ORAL EVERY 8 HOURS PRN
Qty: 90 TABLET | Refills: 0 | Status: SHIPPED | OUTPATIENT
Start: 2018-03-05 | End: 2018-04-04 | Stop reason: SDUPTHER

## 2018-03-05 RX ORDER — GLIMEPIRIDE 4 MG/1
TABLET ORAL
Qty: 30 TABLET | Refills: 3 | Status: SHIPPED | OUTPATIENT
Start: 2018-03-05 | End: 2018-10-26 | Stop reason: SDUPTHER

## 2018-03-05 RX ORDER — PREGABALIN 75 MG/1
CAPSULE ORAL
Qty: 60 CAPSULE | Refills: 2 | Status: SHIPPED | OUTPATIENT
Start: 2018-03-05 | End: 2018-06-08 | Stop reason: SDUPTHER

## 2018-03-09 ENCOUNTER — OFFICE VISIT (OUTPATIENT)
Dept: PRIMARY CARE CLINIC | Age: 53
End: 2018-03-09
Payer: MEDICAID

## 2018-03-09 ENCOUNTER — HOSPITAL ENCOUNTER (OUTPATIENT)
Dept: OTHER | Age: 53
Discharge: HOME | End: 2018-03-09
Attending: FAMILY MEDICINE | Admitting: FAMILY MEDICINE

## 2018-03-09 VITALS
HEART RATE: 86 BPM | BODY MASS INDEX: 50.02 KG/M2 | OXYGEN SATURATION: 92 % | WEIGHT: 293 LBS | DIASTOLIC BLOOD PRESSURE: 80 MMHG | RESPIRATION RATE: 20 BRPM | SYSTOLIC BLOOD PRESSURE: 126 MMHG | HEIGHT: 64 IN

## 2018-03-09 DIAGNOSIS — M54.2 CHRONIC NECK AND BACK PAIN: ICD-10-CM

## 2018-03-09 DIAGNOSIS — G89.29 CHRONIC NECK AND BACK PAIN: ICD-10-CM

## 2018-03-09 DIAGNOSIS — E11.40 TYPE 2 DIABETES MELLITUS WITH DIABETIC NEUROPATHY, WITHOUT LONG-TERM CURRENT USE OF INSULIN (HCC): ICD-10-CM

## 2018-03-09 DIAGNOSIS — I10 ESSENTIAL HYPERTENSION: ICD-10-CM

## 2018-03-09 DIAGNOSIS — J43.1 PANLOBULAR EMPHYSEMA (HCC): ICD-10-CM

## 2018-03-09 DIAGNOSIS — I10 ESSENTIAL HYPERTENSION: Primary | ICD-10-CM

## 2018-03-09 DIAGNOSIS — M54.9 CHRONIC NECK AND BACK PAIN: ICD-10-CM

## 2018-03-09 LAB
A/G RATIO: 1.3 (ref 0.8–2)
ALBUMIN SERPL-MCNC: 4.1 G/DL (ref 3.4–4.8)
ALP BLD-CCNC: 103 U/L (ref 25–100)
ALT SERPL-CCNC: 20 U/L (ref 4–36)
ANION GAP SERPL CALCULATED.3IONS-SCNC: 12 MMOL/L (ref 3–16)
AST SERPL-CCNC: 17 U/L (ref 8–33)
BILIRUB SERPL-MCNC: 0.8 MG/DL (ref 0.3–1.2)
BUN BLDV-MCNC: 18 MG/DL (ref 6–20)
CALCIUM SERPL-MCNC: 9.4 MG/DL (ref 8.5–10.5)
CHLORIDE BLD-SCNC: 99 MMOL/L (ref 98–107)
CHOLESTEROL, TOTAL: 109 MG/DL (ref 0–200)
CO2: 27 MMOL/L (ref 20–30)
CREAT SERPL-MCNC: 0.9 MG/DL (ref 0.4–1.2)
CREATININE URINE: 208.6 MG/DL (ref 1.5–300)
GFR AFRICAN AMERICAN: >59
GFR NON-AFRICAN AMERICAN: >60
GLOBULIN: 3.2 G/DL
GLUCOSE BLD-MCNC: 171 MG/DL (ref 74–106)
HBA1C MFR BLD: 7.5 %
HCT VFR BLD CALC: 42.4 % (ref 37–47)
HDLC SERPL-MCNC: 40 MG/DL (ref 40–60)
HEMOGLOBIN: 13.4 G/DL (ref 11.5–16.5)
LDL CHOLESTEROL CALCULATED: 38 MG/DL
MCH RBC QN AUTO: 30.4 PG (ref 27–32)
MCHC RBC AUTO-ENTMCNC: 31.6 G/DL (ref 31–35)
MCV RBC AUTO: 96.1 FL (ref 80–100)
MICROALBUMIN UR-MCNC: <1.2 MG/DL (ref 0–22)
MICROALBUMIN/CREAT UR-RTO: NORMAL MG/G (ref 0–30)
PDW BLD-RTO: 13.2 % (ref 11–16)
PLATELET # BLD: 233 K/UL (ref 150–400)
PMV BLD AUTO: 11.4 FL (ref 6–10)
POTASSIUM SERPL-SCNC: 4.4 MMOL/L (ref 3.4–5.1)
RBC # BLD: 4.41 M/UL (ref 3.8–5.8)
SODIUM BLD-SCNC: 138 MMOL/L (ref 136–145)
TOTAL PROTEIN: 7.3 G/DL (ref 6.4–8.3)
TRIGL SERPL-MCNC: 157 MG/DL (ref 0–249)
VLDLC SERPL CALC-MCNC: 31 MG/DL
WBC # BLD: 8.4 K/UL (ref 4–11)

## 2018-03-09 PROCEDURE — 99214 OFFICE O/P EST MOD 30 MIN: CPT | Performed by: FAMILY MEDICINE

## 2018-03-09 RX ORDER — ERGOCALCIFEROL 1.25 MG/1
CAPSULE ORAL
Qty: 8 CAPSULE | Refills: 0 | Status: SHIPPED | OUTPATIENT
Start: 2018-03-09 | End: 2019-06-13 | Stop reason: SDUPTHER

## 2018-03-09 RX ORDER — METHYLPREDNISOLONE 4 MG/1
TABLET ORAL
Qty: 1 KIT | Refills: 0 | Status: SHIPPED | OUTPATIENT
Start: 2018-03-09 | End: 2018-03-15

## 2018-03-09 RX ORDER — CEFDINIR 300 MG/1
300 CAPSULE ORAL 2 TIMES DAILY
Qty: 20 CAPSULE | Refills: 0 | Status: SHIPPED | OUTPATIENT
Start: 2018-03-09 | End: 2018-03-19

## 2018-03-09 RX ORDER — IPRATROPIUM BROMIDE AND ALBUTEROL SULFATE 2.5; .5 MG/3ML; MG/3ML
1 SOLUTION RESPIRATORY (INHALATION) EVERY 4 HOURS PRN
Qty: 360 ML | Refills: 0 | Status: SHIPPED | OUTPATIENT
Start: 2018-03-09 | End: 2021-02-25

## 2018-03-09 ASSESSMENT — ENCOUNTER SYMPTOMS
VOMITING: 0
COUGH: 1
DIARRHEA: 0
EYE REDNESS: 0
BACK PAIN: 1
CONSTIPATION: 0
EYE ITCHING: 0
ABDOMINAL PAIN: 0
SHORTNESS OF BREATH: 1
WHEEZING: 1
SORE THROAT: 1
EYE DISCHARGE: 0
NAUSEA: 0
RHINORRHEA: 1

## 2018-03-09 NOTE — ASSESSMENT & PLAN NOTE
Improved per patient's home readings. She is to continue metformin and Amaryl. She is also to continue Lyrica for diabetic neuropathy. We'll continue to monitor random urinary drug screens and Verle Fuchs reports. Will obtain an A1c and microalbumin every few months. Patient is up-to-date on her diabetic foot exam. She is on an ACE inhibitor.

## 2018-03-09 NOTE — PROGRESS NOTES
reports not feeling well since Sunday. She congestion, drainage, rhinorrhea, productive cough, wheezing, and increased SOA. She also reports a headache. Patient's medications, allergies, past medical, surgical, social and family histories were reviewed and updated as appropriate. Review of Systems   Constitutional: Negative for chills, fatigue and fever. HENT: Positive for congestion, postnasal drip, rhinorrhea and sore throat. Negative for ear pain. Eyes: Negative for discharge, redness and itching. Respiratory: Positive for cough, shortness of breath and wheezing. Cardiovascular: Negative for chest pain, palpitations and leg swelling. Gastrointestinal: Negative for abdominal pain, constipation, diarrhea, nausea and vomiting. Endocrine: Negative for cold intolerance and heat intolerance. Genitourinary: Negative for dysuria. Musculoskeletal: Positive for arthralgias, back pain and neck pain. Negative for joint swelling. Skin: Negative for rash and wound. Neurological: Positive for weakness, numbness and headaches. Psychiatric/Behavioral: Negative for dysphoric mood and sleep disturbance. The patient is not nervous/anxious. OBJECTIVE:  /80 (Site: Left Arm, Position: Sitting)   Pulse 86   Resp 20   Ht 5' 4\" (1.626 m)   Wt (!) 314 lb (142.4 kg)   SpO2 92% Comment: room air  BMI 53.90 kg/m²      Wt Readings from Last 2 Encounters:   03/09/18 (!) 314 lb (142.4 kg)   12/08/17 (!) 318 lb (144.2 kg)     BP Readings from Last 2 Encounters:   03/09/18 126/80   12/08/17 124/70      Pulse Readings from Last 2 Encounters:   03/09/18 86   12/08/17 71     Body mass index is 53.9 kg/m². SpO2 Readings from Last 2 Encounters:   09/08/17 93%   06/09/17 95%       Physical Exam   Constitutional: She is oriented to person, place, and time. She appears well-developed and well-nourished. HENT:   Head: Normocephalic and atraumatic.    Right Ear: External ear normal.   Left Ear:

## 2018-03-12 ENCOUNTER — TELEPHONE (OUTPATIENT)
Dept: PULMONOLOGY | Facility: CLINIC | Age: 53
End: 2018-03-12

## 2018-03-12 ENCOUNTER — TELEPHONE (OUTPATIENT)
Dept: PRIMARY CARE CLINIC | Age: 53
End: 2018-03-12

## 2018-03-12 NOTE — TELEPHONE ENCOUNTER
Pt called and left a message on the VM that she needed to reschedule the apt she missed, called the number that she provided, did not answer and no VM set up.

## 2018-03-31 DIAGNOSIS — I10 ESSENTIAL HYPERTENSION: ICD-10-CM

## 2018-03-31 DIAGNOSIS — E11.40 TYPE 2 DIABETES MELLITUS WITH DIABETIC NEUROPATHY, WITHOUT LONG-TERM CURRENT USE OF INSULIN (HCC): ICD-10-CM

## 2018-04-02 RX ORDER — METFORMIN HYDROCHLORIDE 500 MG/1
TABLET, EXTENDED RELEASE ORAL
Qty: 30 TABLET | Refills: 3 | Status: SHIPPED | OUTPATIENT
Start: 2018-04-02 | End: 2018-06-08 | Stop reason: SDUPTHER

## 2018-04-02 RX ORDER — LISINOPRIL 5 MG/1
TABLET ORAL
Qty: 30 TABLET | Refills: 3 | Status: SHIPPED | OUTPATIENT
Start: 2018-04-02 | End: 2018-07-26 | Stop reason: SDUPTHER

## 2018-04-02 RX ORDER — LANOLIN ALCOHOL/MO/W.PET/CERES
CREAM (GRAM) TOPICAL
Qty: 30 TABLET | Refills: 3 | Status: SHIPPED | OUTPATIENT
Start: 2018-04-02 | End: 2018-07-26 | Stop reason: SDUPTHER

## 2018-04-04 DIAGNOSIS — E11.40 TYPE 2 DIABETES MELLITUS WITH DIABETIC NEUROPATHY, WITHOUT LONG-TERM CURRENT USE OF INSULIN (HCC): ICD-10-CM

## 2018-04-04 RX ORDER — HYDROCODONE BITARTRATE AND ACETAMINOPHEN 7.5; 325 MG/1; MG/1
1 TABLET ORAL EVERY 8 HOURS PRN
Qty: 90 TABLET | Refills: 0 | OUTPATIENT
Start: 2018-04-04 | End: 2018-05-04

## 2018-04-04 RX ORDER — HYDROCODONE BITARTRATE AND ACETAMINOPHEN 7.5; 325 MG/1; MG/1
1 TABLET ORAL EVERY 8 HOURS PRN
Qty: 90 TABLET | Refills: 0 | Status: SHIPPED | OUTPATIENT
Start: 2018-04-04 | End: 2018-04-04 | Stop reason: SDUPTHER

## 2018-04-04 RX ORDER — ATORVASTATIN CALCIUM 40 MG/1
TABLET, FILM COATED ORAL
Qty: 30 TABLET | Refills: 3 | Status: SHIPPED | OUTPATIENT
Start: 2018-04-04 | End: 2018-11-20 | Stop reason: SDUPTHER

## 2018-04-04 RX ORDER — HYDROCODONE BITARTRATE AND ACETAMINOPHEN 7.5; 325 MG/1; MG/1
1 TABLET ORAL EVERY 8 HOURS PRN
Qty: 90 TABLET | Refills: 0 | Status: SHIPPED | OUTPATIENT
Start: 2018-04-04 | End: 2018-05-02 | Stop reason: SDUPTHER

## 2018-04-13 ENCOUNTER — TELEPHONE (OUTPATIENT)
Dept: PRIMARY CARE CLINIC | Age: 53
End: 2018-04-13

## 2018-04-26 DIAGNOSIS — M54.9 CHRONIC NECK AND BACK PAIN: ICD-10-CM

## 2018-04-26 DIAGNOSIS — M54.2 CHRONIC NECK AND BACK PAIN: ICD-10-CM

## 2018-04-26 DIAGNOSIS — G89.29 CHRONIC NECK AND BACK PAIN: ICD-10-CM

## 2018-04-26 RX ORDER — TIZANIDINE 4 MG/1
TABLET ORAL
Qty: 90 TABLET | Refills: 1 | Status: SHIPPED | OUTPATIENT
Start: 2018-04-26 | End: 2018-06-29 | Stop reason: SDUPTHER

## 2018-05-02 RX ORDER — HYDROCODONE BITARTRATE AND ACETAMINOPHEN 7.5; 325 MG/1; MG/1
1 TABLET ORAL EVERY 8 HOURS PRN
Qty: 90 TABLET | Refills: 0 | Status: SHIPPED | OUTPATIENT
Start: 2018-05-02 | End: 2018-05-31 | Stop reason: SDUPTHER

## 2018-05-10 ENCOUNTER — TELEPHONE (OUTPATIENT)
Dept: PRIMARY CARE CLINIC | Age: 53
End: 2018-05-10

## 2018-05-31 DIAGNOSIS — G89.29 CHRONIC BACK PAIN, UNSPECIFIED BACK LOCATION, UNSPECIFIED BACK PAIN LATERALITY: Primary | ICD-10-CM

## 2018-05-31 DIAGNOSIS — M54.9 CHRONIC BACK PAIN, UNSPECIFIED BACK LOCATION, UNSPECIFIED BACK PAIN LATERALITY: Primary | ICD-10-CM

## 2018-05-31 RX ORDER — HYDROCODONE BITARTRATE AND ACETAMINOPHEN 7.5; 325 MG/1; MG/1
1 TABLET ORAL EVERY 8 HOURS PRN
Qty: 90 TABLET | Refills: 0 | Status: SHIPPED | OUTPATIENT
Start: 2018-05-31 | End: 2018-06-08 | Stop reason: SDUPTHER

## 2018-06-04 ENCOUNTER — TELEPHONE (OUTPATIENT)
Dept: PRIMARY CARE CLINIC | Age: 53
End: 2018-06-04

## 2018-06-04 DIAGNOSIS — G89.29 CHRONIC NECK AND BACK PAIN: Primary | ICD-10-CM

## 2018-06-04 DIAGNOSIS — M54.2 CHRONIC NECK AND BACK PAIN: Primary | ICD-10-CM

## 2018-06-04 DIAGNOSIS — M54.9 CHRONIC NECK AND BACK PAIN: Primary | ICD-10-CM

## 2018-06-05 ENCOUNTER — TELEPHONE (OUTPATIENT)
Dept: PRIMARY CARE CLINIC | Age: 53
End: 2018-06-05

## 2018-06-08 ENCOUNTER — OFFICE VISIT (OUTPATIENT)
Dept: PRIMARY CARE CLINIC | Age: 53
End: 2018-06-08
Payer: MEDICAID

## 2018-06-08 VITALS
BODY MASS INDEX: 53.38 KG/M2 | SYSTOLIC BLOOD PRESSURE: 110 MMHG | WEIGHT: 293 LBS | HEART RATE: 102 BPM | DIASTOLIC BLOOD PRESSURE: 70 MMHG | OXYGEN SATURATION: 93 %

## 2018-06-08 DIAGNOSIS — G89.29 CHRONIC NECK AND BACK PAIN: Primary | ICD-10-CM

## 2018-06-08 DIAGNOSIS — E11.9 TYPE 2 DIABETES MELLITUS WITHOUT COMPLICATION, WITHOUT LONG-TERM CURRENT USE OF INSULIN (HCC): ICD-10-CM

## 2018-06-08 DIAGNOSIS — M54.9 CHRONIC NECK AND BACK PAIN: Primary | ICD-10-CM

## 2018-06-08 DIAGNOSIS — G57.90 NEUROPATHY OF FOOT, UNSPECIFIED LATERALITY: ICD-10-CM

## 2018-06-08 DIAGNOSIS — M54.2 CHRONIC NECK AND BACK PAIN: Primary | ICD-10-CM

## 2018-06-08 LAB — HBA1C MFR BLD: 7.6 %

## 2018-06-08 PROCEDURE — 99213 OFFICE O/P EST LOW 20 MIN: CPT | Performed by: NURSE PRACTITIONER

## 2018-06-08 PROCEDURE — 83036 HEMOGLOBIN GLYCOSYLATED A1C: CPT | Performed by: NURSE PRACTITIONER

## 2018-06-08 RX ORDER — METFORMIN HYDROCHLORIDE 500 MG/1
TABLET, EXTENDED RELEASE ORAL
Qty: 60 TABLET | Refills: 5 | Status: SHIPPED | OUTPATIENT
Start: 2018-06-08 | End: 2018-08-01 | Stop reason: SDUPTHER

## 2018-06-08 RX ORDER — PREGABALIN 75 MG/1
CAPSULE ORAL
Qty: 60 CAPSULE | Refills: 5 | Status: SHIPPED | OUTPATIENT
Start: 2018-06-08 | End: 2018-09-07 | Stop reason: SDUPTHER

## 2018-06-08 ASSESSMENT — ENCOUNTER SYMPTOMS
COUGH: 0
NAUSEA: 0
EYE PAIN: 0
SHORTNESS OF BREATH: 0
ABDOMINAL PAIN: 0
VOMITING: 0
SORE THROAT: 0

## 2018-06-19 ASSESSMENT — ENCOUNTER SYMPTOMS: BACK PAIN: 1

## 2018-06-29 DIAGNOSIS — G89.29 CHRONIC NECK AND BACK PAIN: ICD-10-CM

## 2018-06-29 DIAGNOSIS — M54.9 CHRONIC NECK AND BACK PAIN: ICD-10-CM

## 2018-06-29 DIAGNOSIS — M54.2 CHRONIC NECK AND BACK PAIN: ICD-10-CM

## 2018-06-29 RX ORDER — TIZANIDINE 4 MG/1
TABLET ORAL
Qty: 90 TABLET | Refills: 1 | Status: SHIPPED | OUTPATIENT
Start: 2018-06-29 | End: 2018-08-27 | Stop reason: SDUPTHER

## 2018-07-02 ENCOUNTER — OFFICE VISIT (OUTPATIENT)
Dept: PULMONOLOGY | Facility: CLINIC | Age: 53
End: 2018-07-02

## 2018-07-02 VITALS
HEIGHT: 64 IN | WEIGHT: 293 LBS | RESPIRATION RATE: 18 BRPM | DIASTOLIC BLOOD PRESSURE: 70 MMHG | OXYGEN SATURATION: 88 % | BODY MASS INDEX: 50.02 KG/M2 | SYSTOLIC BLOOD PRESSURE: 120 MMHG | HEART RATE: 76 BPM

## 2018-07-02 DIAGNOSIS — G47.33 OBSTRUCTIVE SLEEP APNEA: ICD-10-CM

## 2018-07-02 DIAGNOSIS — R09.02 HYPOXIA: ICD-10-CM

## 2018-07-02 DIAGNOSIS — G47.34 NOCTURNAL HYPOXIA: ICD-10-CM

## 2018-07-02 DIAGNOSIS — E66.01 MORBID OBESITY, UNSPECIFIED OBESITY TYPE (HCC): ICD-10-CM

## 2018-07-02 DIAGNOSIS — J44.9 CHRONIC OBSTRUCTIVE PULMONARY DISEASE, UNSPECIFIED COPD TYPE (HCC): ICD-10-CM

## 2018-07-02 DIAGNOSIS — J30.89 OTHER ALLERGIC RHINITIS: ICD-10-CM

## 2018-07-02 DIAGNOSIS — R06.02 SHORTNESS OF BREATH: Primary | ICD-10-CM

## 2018-07-02 PROCEDURE — 99214 OFFICE O/P EST MOD 30 MIN: CPT | Performed by: INTERNAL MEDICINE

## 2018-07-02 RX ORDER — METFORMIN HYDROCHLORIDE 500 MG/1
TABLET, EXTENDED RELEASE ORAL
COMMUNITY
Start: 2018-06-08 | End: 2018-08-28 | Stop reason: ALTCHOICE

## 2018-07-02 RX ORDER — ATORVASTATIN CALCIUM 40 MG/1
TABLET, FILM COATED ORAL
COMMUNITY
Start: 2018-04-04 | End: 2021-01-01 | Stop reason: HOSPADM

## 2018-07-02 RX ORDER — IPRATROPIUM BROMIDE AND ALBUTEROL SULFATE 2.5; .5 MG/3ML; MG/3ML
3 SOLUTION RESPIRATORY (INHALATION)
COMMUNITY
Start: 2018-03-09 | End: 2019-11-19 | Stop reason: SDUPTHER

## 2018-07-02 RX ORDER — TIZANIDINE 4 MG/1
TABLET ORAL
COMMUNITY
Start: 2018-06-29

## 2018-07-02 RX ORDER — HYDROCODONE BITARTRATE AND ACETAMINOPHEN 7.5; 325 MG/1; MG/1
TABLET ORAL
COMMUNITY
Start: 2018-06-05 | End: 2018-07-02 | Stop reason: SDUPTHER

## 2018-07-02 RX ORDER — PREGABALIN 75 MG/1
CAPSULE ORAL
COMMUNITY
Start: 2018-06-08 | End: 2018-07-02 | Stop reason: SDUPTHER

## 2018-07-02 RX ORDER — BLOOD-GLUCOSE METER
KIT MISCELLANEOUS
COMMUNITY
Start: 2017-11-06

## 2018-07-02 RX ORDER — LISINOPRIL 5 MG/1
TABLET ORAL
COMMUNITY
Start: 2018-04-02 | End: 2021-01-01

## 2018-07-02 RX ORDER — LANOLIN ALCOHOL/MO/W.PET/CERES
CREAM (GRAM) TOPICAL
COMMUNITY
Start: 2018-04-02 | End: 2021-01-01

## 2018-07-02 RX ORDER — GLIMEPIRIDE 4 MG/1
TABLET ORAL
COMMUNITY
Start: 2018-03-05

## 2018-07-02 RX ORDER — BUMETANIDE 1 MG/1
1 TABLET ORAL DAILY
COMMUNITY
Start: 2017-12-08 | End: 2021-01-01

## 2018-07-02 RX ORDER — ERGOCALCIFEROL 1.25 MG/1
CAPSULE ORAL
COMMUNITY
Start: 2018-03-09

## 2018-07-02 NOTE — PROGRESS NOTES
"Chief Complaint   Patient presents with   • Follow-up   • Sleeping Problem   • Shortness of Breath       Subjective   Chante Wetzel is a 53 y.o. female.     History of Present Illness   Patient comes today for follow up of shortness of breath, ARIANNA, Hypoxia and COPD.     She actually says that her shortness of breath is definitely improved despite the fact that she has not been on Revatio for more than 2-3 months.     Patient is using Incruse, as prescribed. Exercise tolerance has also remained stable.     She is using her CPAP device on a regular basis at a pressure of 13 with extremely good response.  Her daytime sleepiness and tiredness have almost completely resolved.    The patient continues to have allergic rhinitis symptoms especially since she has not been using her nasal spray on a regular basis.  She quit smoking more than 14 years ago.    The following portions of the patient's history were reviewed and updated as appropriate: allergies, current medications, past family history, past medical history, past social history and past surgical history.    Review of Systems   Constitutional: Negative for chills and fever.   HENT: Negative for sinus pressure, sneezing and sore throat.    Respiratory: Positive for shortness of breath. Negative for cough, chest tightness and wheezing.    Cardiovascular: Negative for chest pain, palpitations and leg swelling.   Psychiatric/Behavioral: Negative for sleep disturbance.       Objective   /70   Pulse 76   Resp 18   Ht 162.6 cm (64.02\")   Wt (!) 138 kg (305 lb)   SpO2 (!) 88% Comment: Resting on room air  BMI 52.33 kg/m²   94% on 2 LPM.    Physical Exam   Constitutional: She is oriented to person, place, and time. She appears well-developed and well-nourished.   HENT:   Head: Atraumatic.   Crowded oropharynx.  Dentures.    Eyes: EOM are normal.   Neck: Neck supple.   Increased adipose tissue   Cardiovascular: Normal rate and regular rhythm.  "   Pulmonary/Chest: Effort normal. No respiratory distress.   Somewhat hyperresonant to percussion  Somewhat decreased A/E without wheezing noted.   Musculoskeletal: She exhibits no edema (Mild.).   Gait was intact.   Neurological: She is alert and oriented to person, place, and time.   Skin: Skin is warm and dry.   Psychiatric: She has a normal mood and affect.   Vitals reviewed.        Assessment/Plan   Chante was seen today for follow-up, sleeping problem and shortness of breath.    Diagnoses and all orders for this visit:    Shortness of breath  -     Ambulatory Referral to Bariatric Surgery    Obstructive sleep apnea  -     Ambulatory Referral to Bariatric Surgery    Chronic obstructive pulmonary disease, unspecified COPD type  -     Ambulatory Referral to Bariatric Surgery    Morbid obesity, unspecified obesity type  -     Ambulatory Referral to Bariatric Surgery    Nocturnal hypoxia    Hypoxia  -     Ambulatory Referral to Bariatric Surgery    Other allergic rhinitis    Other orders  -     umeclidinium-vilanterol (ANORO ELLIPTA) 62.5-25 MCG/INH aerosol powder  inhaler; Inhale 1 puff Daily.           Return in about 4 months (around 11/2/2018) for Recheck.    DISCUSSION (if any):  I reviewed the patient's last echocardiogram which was essentially unremarkable although the tricuspid valve velocity could not be measured to assess for right ventricular systolic pressure etc.  I also reviewed her last PFTs which confirmed moderate COPD.    Her echocardiogram did not reveal any significant abnormality and even the echocardiogram before that, performed in June 2017, did not reveal significantly pulmonary artery pressures/right ventricular systolic pressures, essentially making pulmonary hypertension very unlikely.  Also the fact that her symptoms have actually somewhat improved since she's not been on sildenafil, points towards the fact that pulmonary hypertension is unlikely in this patient.    In order to optimize  medical treatment for underlying COPD, which definitely moderate as of the last PFTs, we will expand her therapy and started on anoro.    Compliance with medications stressed.     Side effects of prescribed medications discussed with the patient    I've asked her to start using her nasal sprays on a regular basis rather than when necessary, especially given the fact that her symptoms of flank poor control of allergic rhinitis.    The patient was advised to continue oxygen, since there has been a good response since the patient is using it as prescribed.    Continue treatment with PAP.    Patient's compliance data was reviewed and the compliance is greater than 90%    Humidification setup, hose and mask care discussed.    Asked to have DME service the equipment atleast once every 3-6 months or so.    Weight loss advised.  I discussed the possibility of bariatric surgery and after much consideration and debate, the patient has agreed to be referred for bariatric surgery program.    Use every night for atleast 4 hours stressed.      Dictated utilizing Dragon dictation.    This document was electronically signed by Yuridia Mccormack MD July 2, 2018  1:52 PM

## 2018-07-10 ENCOUNTER — TELEPHONE (OUTPATIENT)
Dept: PRIMARY CARE CLINIC | Age: 53
End: 2018-07-10

## 2018-07-26 ENCOUNTER — TELEPHONE (OUTPATIENT)
Dept: PRIMARY CARE CLINIC | Age: 53
End: 2018-07-26

## 2018-07-26 DIAGNOSIS — E11.40 TYPE 2 DIABETES MELLITUS WITH DIABETIC NEUROPATHY, WITHOUT LONG-TERM CURRENT USE OF INSULIN (HCC): ICD-10-CM

## 2018-07-26 DIAGNOSIS — I10 ESSENTIAL HYPERTENSION: ICD-10-CM

## 2018-07-26 RX ORDER — LANOLIN ALCOHOL/MO/W.PET/CERES
CREAM (GRAM) TOPICAL
Qty: 30 TABLET | Refills: 5 | Status: SHIPPED | OUTPATIENT
Start: 2018-07-26 | End: 2018-12-04 | Stop reason: SDUPTHER

## 2018-07-26 RX ORDER — LISINOPRIL 5 MG/1
TABLET ORAL
Qty: 30 TABLET | Refills: 5 | Status: SHIPPED | OUTPATIENT
Start: 2018-07-26 | End: 2019-01-15 | Stop reason: SDUPTHER

## 2018-07-26 NOTE — TELEPHONE ENCOUNTER
Requested Prescriptions     Signed Prescriptions Disp Refills    lisinopril (PRINIVIL;ZESTRIL) 5 MG tablet 30 tablet 5     Sig: take 1 tablet by mouth once daily     Authorizing Provider: Angelita Bedoya     Ordering User: Benito Vazquez    ferrous sulfate 325 (65 Fe) MG EC tablet 30 tablet 5     Sig: TAKE 1 TABLET BY MOUTH ONCE DAILY WITH BREAKFAST     Authorizing Provider: Angelita Bedoya     Ordering User: Benito Vazquez

## 2018-07-30 DIAGNOSIS — G89.29 CHRONIC BACK PAIN, UNSPECIFIED BACK LOCATION, UNSPECIFIED BACK PAIN LATERALITY: ICD-10-CM

## 2018-07-30 DIAGNOSIS — M19.90 ARTHRITIS: ICD-10-CM

## 2018-07-30 DIAGNOSIS — M54.9 CHRONIC BACK PAIN, UNSPECIFIED BACK LOCATION, UNSPECIFIED BACK PAIN LATERALITY: ICD-10-CM

## 2018-07-31 RX ORDER — HYDROCODONE BITARTRATE AND ACETAMINOPHEN 7.5; 325 MG/1; MG/1
TABLET ORAL
Qty: 90 TABLET | Refills: 0 | Status: SHIPPED | OUTPATIENT
Start: 2018-07-31 | End: 2018-08-29 | Stop reason: SDUPTHER

## 2018-08-01 ENCOUNTER — TELEPHONE (OUTPATIENT)
Dept: PRIMARY CARE CLINIC | Age: 53
End: 2018-08-01

## 2018-08-01 RX ORDER — METFORMIN HYDROCHLORIDE 500 MG/1
TABLET, EXTENDED RELEASE ORAL
Qty: 60 TABLET | Refills: 5 | Status: SHIPPED | OUTPATIENT
Start: 2018-08-01 | End: 2018-12-04 | Stop reason: SDUPTHER

## 2018-08-13 DIAGNOSIS — I10 ESSENTIAL HYPERTENSION: ICD-10-CM

## 2018-08-13 RX ORDER — BUMETANIDE 1 MG/1
1 TABLET ORAL DAILY
Qty: 30 TABLET | Refills: 5 | Status: SHIPPED | OUTPATIENT
Start: 2018-08-13 | End: 2019-02-12 | Stop reason: SDUPTHER

## 2018-08-27 DIAGNOSIS — M54.2 CHRONIC NECK AND BACK PAIN: ICD-10-CM

## 2018-08-27 DIAGNOSIS — M54.9 CHRONIC NECK AND BACK PAIN: ICD-10-CM

## 2018-08-27 DIAGNOSIS — G89.29 CHRONIC NECK AND BACK PAIN: ICD-10-CM

## 2018-08-27 RX ORDER — TIZANIDINE 4 MG/1
TABLET ORAL
Qty: 90 TABLET | Refills: 1 | Status: SHIPPED | OUTPATIENT
Start: 2018-08-27 | End: 2018-09-07 | Stop reason: SDUPTHER

## 2018-08-28 ENCOUNTER — OFFICE VISIT (OUTPATIENT)
Dept: CARDIOLOGY | Facility: CLINIC | Age: 53
End: 2018-08-28

## 2018-08-28 VITALS
OXYGEN SATURATION: 97 % | BODY MASS INDEX: 50.02 KG/M2 | WEIGHT: 293 LBS | HEIGHT: 64 IN | RESPIRATION RATE: 18 BRPM | DIASTOLIC BLOOD PRESSURE: 72 MMHG | HEART RATE: 104 BPM | SYSTOLIC BLOOD PRESSURE: 132 MMHG

## 2018-08-28 DIAGNOSIS — I10 ESSENTIAL HYPERTENSION: ICD-10-CM

## 2018-08-28 DIAGNOSIS — I10 BENIGN HYPERTENSION: Primary | ICD-10-CM

## 2018-08-28 DIAGNOSIS — I27.20 PULMONARY HYPERTENSION (HCC): ICD-10-CM

## 2018-08-28 DIAGNOSIS — IMO0001 CLASS 3 OBESITY DUE TO EXCESS CALORIES WITH SERIOUS COMORBIDITY AND BODY MASS INDEX (BMI) OF 50.0 TO 59.9 IN ADULT: ICD-10-CM

## 2018-08-28 DIAGNOSIS — I27.81 COR PULMONALE, CHRONIC (HCC): ICD-10-CM

## 2018-08-28 DIAGNOSIS — J43.2 CENTRILOBULAR EMPHYSEMA (HCC): ICD-10-CM

## 2018-08-28 DIAGNOSIS — R06.09 DYSPNEA ON EXERTION: ICD-10-CM

## 2018-08-28 PROCEDURE — 99214 OFFICE O/P EST MOD 30 MIN: CPT | Performed by: INTERNAL MEDICINE

## 2018-08-28 RX ORDER — METFORMIN HYDROCHLORIDE 500 MG/1
TABLET, EXTENDED RELEASE ORAL
COMMUNITY
Start: 2018-08-01 | End: 2021-01-01

## 2018-08-28 NOTE — PROGRESS NOTES
Subjective:     Encounter Date:08/28/2018      Patient ID: Chante Wetzel is a 53 y.o. female.    Chief Complaint: Morbid obesity  HPI  This is a 53-year-old female patient who reports to cardiology clinic for routine follow-up.  The patient has a history of chronic cor pulmonale due to morbid obesity and Pickwickian syndrome.  The patient indicates that she has gone on a diet and has lost 11 pounds over the last one month.  Her shortness of breath has improved significantly and she has been able to exercise for the first time in several years.  Her peripheral edema has improved somewhat.  She has no chest discomfort at rest or with activity.  There is no orthopnea PND.  There is no dizziness palpitations or syncope.  She remains a nonsmoker.  The following portions of the patient's history were reviewed and updated as appropriate: allergies, current medications, past family history, past medical history, past social history, past surgical history and problem  Review of Systems   Constitution: Negative for chills, diaphoresis, fever, weakness, malaise/fatigue and weight gain.   HENT: Negative for ear discharge, hearing loss, hoarse voice and nosebleeds.    Eyes: Negative for discharge, double vision, pain and photophobia.   Cardiovascular: Positive for dyspnea on exertion. Negative for chest pain, claudication, cyanosis, irregular heartbeat, leg swelling, near-syncope, orthopnea, palpitations, paroxysmal nocturnal dyspnea and syncope.   Respiratory: Negative for cough, hemoptysis, shortness of breath, sputum production and wheezing.    Endocrine: Negative for cold intolerance, heat intolerance, polydipsia, polyphagia and polyuria.   Hematologic/Lymphatic: Negative for adenopathy and bleeding problem. Does not bruise/bleed easily.   Skin: Negative for color change, flushing, itching and rash.   Musculoskeletal: Negative for muscle cramps, muscle weakness, myalgias and stiffness.   Gastrointestinal: Negative for  abdominal pain, diarrhea, hematemesis, hematochezia, nausea and vomiting.   Genitourinary: Negative for dysuria, frequency and nocturia.   Neurological: Negative for focal weakness, loss of balance, numbness, paresthesias and seizures.   Psychiatric/Behavioral: Negative for altered mental status, hallucinations and suicidal ideas.   Allergic/Immunologic: Negative for HIV exposure, hives and persistent infections.           Current Outpatient Prescriptions:   •  apixaban (ELIQUIS) 2.5 MG tablet tablet, Take 2.5 mg by mouth Every 12 (Twelve) Hours., Disp: , Rfl:   •  atorvastatin (LIPITOR) 40 MG tablet, take 1 tablet by mouth at bedtime, Disp: , Rfl:   •  Blood Glucose Monitoring Suppl (ONE TOUCH ULTRA MINI) w/Device kit, use as directed, Disp: , Rfl: 0  •  Blood Glucose Monitoring Suppl (ONE TOUCH ULTRA MINI) w/Device kit, use as directed, Disp: , Rfl:   •  bumetanide (BUMEX) 1 MG tablet, Take 1 mg by mouth Daily., Disp: , Rfl:   •  ferrous sulfate 325 (65 FE) MG EC tablet, TAKE 1 TABLET BY MOUTH ONCE DAILY WITH BREAKFAST, Disp: , Rfl:   •  fluticasone (FLONASE) 50 MCG/ACT nasal spray, 1 spray into each nostril 2 (Two) Times a Day., Disp: 1 each, Rfl: 5  •  glimepiride (AMARYL) 4 MG tablet, take 1 tablet by mouth every morning BEFORE BREAKFAST, Disp: , Rfl:   •  HYDROcodone-acetaminophen (NORCO) 7.5-325 MG per tablet, every 8 (eight) hours., Disp: , Rfl:   •  ipratropium-albuterol (DUO-NEB) 0.5-2.5 mg/3 ml nebulizer, Inhale 3 mL., Disp: , Rfl:   •  lisinopril (PRINIVIL,ZESTRIL) 5 MG tablet, take 1 tablet by mouth once daily, Disp: , Rfl:   •  metFORMIN ER (GLUCOPHAGE-XR) 500 MG 24 hr tablet, 1 ac breakfast and supper, Disp: , Rfl:   •  metoprolol tartrate (LOPRESSOR) 25 MG tablet, Take 0.5 tablets by mouth 2 times daily, Disp: , Rfl:   •  pregabalin (LYRICA) 75 MG capsule, Take 75 mg by mouth 2 (Two) Times a Day., Disp: , Rfl:   •  tiZANidine (ZANAFLEX) 4 MG tablet, take 1 tablet by mouth every 8 hours if needed for  "muscle spasm, Disp: , Rfl:   •  umeclidinium-vilanterol (ANORO ELLIPTA) 62.5-25 MCG/INH aerosol powder  inhaler, Inhale 1 puff Daily., Disp: 1 each, Rfl: 5  •  vitamin D (ERGOCALCIFEROL) 02887 units capsule capsule, take 1 capsule by mouth every week, Disp: , Rfl:      Objective:     Physical Exam   Constitutional: She is oriented to person, place, and time. She appears well-developed and well-nourished.   HENT:   Head: Normocephalic and atraumatic.   Mouth/Throat: Oropharynx is clear and moist.   Eyes: Pupils are equal, round, and reactive to light. Conjunctivae and EOM are normal. No scleral icterus.   Neck: Normal range of motion. Neck supple. No JVD present. No tracheal deviation present. No thyromegaly present.   Cardiovascular: Normal rate, regular rhythm, S1 normal, S2 normal, normal heart sounds, intact distal pulses and normal pulses.  PMI is not displaced.  Exam reveals no gallop and no friction rub.    No murmur heard.  Pulmonary/Chest: Effort normal and breath sounds normal. No respiratory distress. She has no wheezes. She has no rales.   Abdominal: Soft. Bowel sounds are normal. She exhibits no distension and no mass. There is no tenderness. There is no rebound and no guarding.   Musculoskeletal: Normal range of motion. She exhibits no edema or deformity.   Neurological: She is alert and oriented to person, place, and time. She displays normal reflexes. No cranial nerve deficit. Coordination normal.   Skin: Skin is warm and dry. No rash noted. No erythema.   Psychiatric: She has a normal mood and affect. Her behavior is normal. Thought content normal.     Blood pressure 132/72, pulse 104, resp. rate 18, height 162.6 cm (64.02\"), weight 136 kg (300 lb), SpO2 97 %.   Lab Review:       Assessment:         1. Benign hypertension  Acceptionable blood pressure control.    2. Cor pulmonale (chronic)  Stable symptoms.    3. Essential hypertension  Blood pressure control remains within the acceptable " range.    4. Pulmonary hypertension  This is primarily the results of Pickwickian syndrome.    5. Centrilobular emphysema (CMS/HCC)  Fortunately the patient does not smoke.    6. Dyspnea on exertion  This is the result of morbid obesity and aerobic deconditioning.  No evidence of congestive heart failure.    7. Class 3 obesity due to excess calories with serious comorbidity and body mass index (BMI) of 50.0 to 59.9 in adult (CMS/HCC)  The patient has lost 11 pounds since her last visit.    Procedures     Plan:       The patient has been congratulated on her weight loss.  She is encouraged to continue these efforts.  No changes in her medication therapy is indicated at this time.  No additional cardiovascular testing is warranted at this time.

## 2018-08-29 DIAGNOSIS — M19.90 ARTHRITIS: ICD-10-CM

## 2018-08-29 DIAGNOSIS — G89.29 CHRONIC BACK PAIN, UNSPECIFIED BACK LOCATION, UNSPECIFIED BACK PAIN LATERALITY: ICD-10-CM

## 2018-08-29 DIAGNOSIS — M54.9 CHRONIC BACK PAIN, UNSPECIFIED BACK LOCATION, UNSPECIFIED BACK PAIN LATERALITY: ICD-10-CM

## 2018-08-29 RX ORDER — HYDROCODONE BITARTRATE AND ACETAMINOPHEN 7.5; 325 MG/1; MG/1
TABLET ORAL
Qty: 90 TABLET | Refills: 0 | Status: SHIPPED | OUTPATIENT
Start: 2018-08-29 | End: 2018-09-26 | Stop reason: SDUPTHER

## 2018-08-29 NOTE — TELEPHONE ENCOUNTER
Pt requesting refills and also wanted to inform jenae that she wants a script for anoro inhaler they put her on that at pulmonology states she has been on a diet and lost 12 lbs her sugar is doing well getting labs done Friday. Will place agustin on your desk.

## 2018-09-01 ENCOUNTER — HOSPITAL ENCOUNTER (OUTPATIENT)
Facility: HOSPITAL | Age: 53
Discharge: HOME | End: 2018-09-01
Payer: MEDICAID

## 2018-09-01 DIAGNOSIS — E11.9 TYPE 2 DIABETES MELLITUS WITHOUT COMPLICATION, WITHOUT LONG-TERM CURRENT USE OF INSULIN (HCC): ICD-10-CM

## 2018-09-01 LAB
A/G RATIO: 1.3 (ref 0.8–2)
ALBUMIN SERPL-MCNC: 3.9 G/DL (ref 3.4–4.8)
ALP BLD-CCNC: 88 U/L (ref 25–100)
ALT SERPL-CCNC: 17 U/L (ref 4–36)
ANION GAP SERPL CALCULATED.3IONS-SCNC: 13 MMOL/L (ref 3–16)
AST SERPL-CCNC: 17 U/L (ref 8–33)
BILIRUB SERPL-MCNC: 0.7 MG/DL (ref 0.3–1.2)
BUN BLDV-MCNC: 16 MG/DL (ref 6–20)
CALCIUM SERPL-MCNC: 9.5 MG/DL (ref 8.5–10.5)
CHLORIDE BLD-SCNC: 101 MMOL/L (ref 98–107)
CHOLESTEROL, TOTAL: 109 MG/DL (ref 0–200)
CO2: 23 MMOL/L (ref 20–30)
CREAT SERPL-MCNC: 1 MG/DL (ref 0.4–1.2)
GFR AFRICAN AMERICAN: >59
GFR NON-AFRICAN AMERICAN: 57
GLOBULIN: 3.1 G/DL
GLUCOSE BLD-MCNC: 123 MG/DL (ref 74–106)
HBA1C MFR BLD: 7.5 %
HDLC SERPL-MCNC: 33 MG/DL (ref 40–60)
LDL CHOLESTEROL CALCULATED: 48 MG/DL
POTASSIUM SERPL-SCNC: 4.5 MMOL/L (ref 3.4–5.1)
SODIUM BLD-SCNC: 137 MMOL/L (ref 136–145)
TOTAL PROTEIN: 7 G/DL (ref 6.4–8.3)
TRIGL SERPL-MCNC: 142 MG/DL (ref 0–249)
VLDLC SERPL CALC-MCNC: 28 MG/DL

## 2018-09-01 PROCEDURE — 36415 COLL VENOUS BLD VENIPUNCTURE: CPT

## 2018-09-01 PROCEDURE — 80061 LIPID PANEL: CPT

## 2018-09-01 PROCEDURE — 83036 HEMOGLOBIN GLYCOSYLATED A1C: CPT

## 2018-09-01 PROCEDURE — 80053 COMPREHEN METABOLIC PANEL: CPT

## 2018-09-07 ENCOUNTER — OFFICE VISIT (OUTPATIENT)
Dept: PRIMARY CARE CLINIC | Age: 53
End: 2018-09-07
Payer: MEDICAID

## 2018-09-07 VITALS
BODY MASS INDEX: 50.81 KG/M2 | DIASTOLIC BLOOD PRESSURE: 80 MMHG | HEART RATE: 69 BPM | OXYGEN SATURATION: 96 % | SYSTOLIC BLOOD PRESSURE: 130 MMHG | WEIGHT: 293 LBS

## 2018-09-07 DIAGNOSIS — M19.90 ARTHRITIS: ICD-10-CM

## 2018-09-07 DIAGNOSIS — M54.9 CHRONIC NECK AND BACK PAIN: ICD-10-CM

## 2018-09-07 DIAGNOSIS — G57.90 NEUROPATHY OF FOOT, UNSPECIFIED LATERALITY: ICD-10-CM

## 2018-09-07 DIAGNOSIS — E11.65 TYPE 2 DIABETES MELLITUS WITH HYPERGLYCEMIA, WITHOUT LONG-TERM CURRENT USE OF INSULIN (HCC): Primary | ICD-10-CM

## 2018-09-07 DIAGNOSIS — G89.29 CHRONIC NECK AND BACK PAIN: ICD-10-CM

## 2018-09-07 DIAGNOSIS — M54.2 CHRONIC NECK AND BACK PAIN: ICD-10-CM

## 2018-09-07 PROCEDURE — 99213 OFFICE O/P EST LOW 20 MIN: CPT | Performed by: NURSE PRACTITIONER

## 2018-09-07 RX ORDER — PREGABALIN 100 MG/1
CAPSULE ORAL
Qty: 60 CAPSULE | Refills: 5 | Status: SHIPPED | OUTPATIENT
Start: 2018-09-07 | End: 2019-03-11 | Stop reason: SDUPTHER

## 2018-09-07 RX ORDER — TIZANIDINE 4 MG/1
TABLET ORAL
Qty: 90 TABLET | Refills: 5 | Status: SHIPPED | OUTPATIENT
Start: 2018-09-07 | End: 2019-03-05 | Stop reason: SDUPTHER

## 2018-09-07 ASSESSMENT — ENCOUNTER SYMPTOMS
NAUSEA: 0
SORE THROAT: 0
SHORTNESS OF BREATH: 0
COUGH: 0
EYE PAIN: 0
ABDOMINAL PAIN: 0
VOMITING: 0

## 2018-09-19 ASSESSMENT — ENCOUNTER SYMPTOMS: BACK PAIN: 1

## 2018-09-20 NOTE — PROGRESS NOTES
SUBJECTIVE:    Patient ID: Vidya Vang is a 52 y.o. female. Medical history Review  Past Medical, Family, and Social History reviewed and does contribute to the patient presenting condition    Health Maintenance Due   Topic Date Due    Hepatitis C screen  1965    HIV screen  04/17/1978    DTaP/Tdap/Td vaccine (1 - Tdap) 04/17/1982    Pneumococcal med risk (1 of 1 - PPSV23) 04/17/1982    Shingles Vaccine (1 of 2 - 2 Dose Series) 04/17/2013    Diabetic retinal exam  05/10/2017    Cervical cancer screen  08/21/2017    Diabetic foot exam  03/09/2018    Breast cancer screen  04/25/2018    Flu vaccine (1) 09/01/2018        HPI:   Chief Complaint   Patient presents with    Numbness     Patient here today for a follow up. She states her hands stay numb, and her knuckles stay sore. She states that she has lost 15 pounds in 38 days. She states she has been more active. She has been watching her diet better. She takes Lyrica for her neuropathy which helps tremendously. Patient's medications, allergies, past medical, surgical, social and family histories were reviewed and updated as appropriate. Review of Systems Reviewed and acurate. See MA note. OBJECTIVE:  /80 (Site: Right Upper Arm, Position: Sitting, Cuff Size: Large Adult)   Pulse 69   Wt 296 lb (134.3 kg)   SpO2 96%   BMI 50.81 kg/m²    Physical Exam   Constitutional: She is oriented to person, place, and time. She appears well-developed and well-nourished. No distress. HENT:   Head: Normocephalic. Right Ear: Tympanic membrane normal.   Left Ear: Tympanic membrane normal.   Mouth/Throat: No oropharyngeal exudate. Eyes: Lids are normal.   Neck: Neck supple. Cardiovascular: Normal rate, regular rhythm and normal heart sounds. Pulmonary/Chest: Effort normal and breath sounds normal.   Abdominal: Soft. Bowel sounds are normal. She exhibits no distension. There is no tenderness.    Musculoskeletal: She exhibits no edema. Lymphadenopathy:     She has no cervical adenopathy. Neurological: She is alert and oriented to person, place, and time. Skin: Skin is warm and dry. Psychiatric: She has a normal mood and affect. Vitals reviewed.       Results in Past 30 Days  Result Component Current Result Ref Range Previous Result Ref Range   Alb 3.9 (9/1/2018) 3.4 - 4.8 g/dL Not in Time Range    Albumin/Globulin Ratio 1.3 (9/1/2018) 0.8 - 2.0 Not in Time Range    Alkaline Phosphatase 88 (9/1/2018) 25 - 100 U/L Not in Time Range    ALT 17 (9/1/2018) 4 - 36 U/L Not in Time Range    AST 17 (9/1/2018) 8 - 33 U/L Not in Time Range    BUN 16 (9/1/2018) 6 - 20 mg/dL Not in Time Range    Calcium 9.5 (9/1/2018) 8.5 - 10.5 mg/dL Not in Time Range    Chloride 101 (9/1/2018) 98 - 107 mmol/L Not in Time Range    CO2 23 (9/1/2018) 20 - 30 mmol/L Not in Time Range    CREATININE 1.0 (9/1/2018) 0.4 - 1.2 mg/dL Not in Time Range    GFR  >59 (9/1/2018) >59 Not in Time Range    GFR Non- 57 (L) (9/1/2018) >59 Not in Time Range    Globulin 3.1 (9/1/2018) g/dL Not in Time Range    Glucose 123 (H) (9/1/2018) 74 - 106 mg/dL Not in Time Range    Potassium 4.5 (9/1/2018) 3.4 - 5.1 mmol/L Not in Time Range    Sodium 137 (9/1/2018) 136 - 145 mmol/L Not in Time Range    Total Bilirubin 0.7 (9/1/2018) 0.3 - 1.2 mg/dL Not in Time Range    Total Protein 7.0 (9/1/2018) 6.4 - 8.3 g/dL Not in Time Range        Hemoglobin A1C (%)   Date Value   09/01/2018 7.5 (H)     Microscopic Examination (no units)   Date Value   04/13/2015 YES     Microalbumin, Random Urine (mg/dL)   Date Value   03/09/2018 <1.20     LDL Calculated (mg/dL)   Date Value   09/01/2018 48         Lab Results   Component Value Date    WBC 8.4 03/09/2018    NEUTROABS 5.3 09/11/2015    HGB 13.4 03/09/2018    HCT 42.4 03/09/2018    MCV 96.1 03/09/2018     03/09/2018       Lab Results   Component Value Date    TSH 3.93 04/13/2015       Prior to Visit

## 2018-09-25 ENCOUNTER — TELEPHONE (OUTPATIENT)
Dept: PRIMARY CARE CLINIC | Age: 53
End: 2018-09-25

## 2018-09-26 DIAGNOSIS — G89.29 CHRONIC BACK PAIN, UNSPECIFIED BACK LOCATION, UNSPECIFIED BACK PAIN LATERALITY: ICD-10-CM

## 2018-09-26 DIAGNOSIS — M54.9 CHRONIC BACK PAIN, UNSPECIFIED BACK LOCATION, UNSPECIFIED BACK PAIN LATERALITY: ICD-10-CM

## 2018-09-26 DIAGNOSIS — M19.90 ARTHRITIS: ICD-10-CM

## 2018-09-26 RX ORDER — HYDROCODONE BITARTRATE AND ACETAMINOPHEN 7.5; 325 MG/1; MG/1
1 TABLET ORAL EVERY 8 HOURS PRN
Qty: 90 TABLET | Refills: 0 | Status: SHIPPED | OUTPATIENT
Start: 2018-09-26 | End: 2018-10-24 | Stop reason: SDUPTHER

## 2018-10-09 ENCOUNTER — TELEPHONE (OUTPATIENT)
Dept: PRIMARY CARE CLINIC | Age: 53
End: 2018-10-09

## 2018-10-23 ENCOUNTER — TELEPHONE (OUTPATIENT)
Dept: PRIMARY CARE CLINIC | Age: 53
End: 2018-10-23

## 2018-10-24 DIAGNOSIS — G89.29 CHRONIC BACK PAIN, UNSPECIFIED BACK LOCATION, UNSPECIFIED BACK PAIN LATERALITY: ICD-10-CM

## 2018-10-24 DIAGNOSIS — M54.9 CHRONIC BACK PAIN, UNSPECIFIED BACK LOCATION, UNSPECIFIED BACK PAIN LATERALITY: ICD-10-CM

## 2018-10-24 DIAGNOSIS — M19.90 ARTHRITIS: ICD-10-CM

## 2018-10-24 RX ORDER — HYDROCODONE BITARTRATE AND ACETAMINOPHEN 7.5; 325 MG/1; MG/1
1 TABLET ORAL EVERY 8 HOURS PRN
Qty: 90 TABLET | Refills: 0 | Status: SHIPPED | OUTPATIENT
Start: 2018-10-24 | End: 2018-11-19 | Stop reason: SDUPTHER

## 2018-10-26 DIAGNOSIS — E11.40 TYPE 2 DIABETES MELLITUS WITH DIABETIC NEUROPATHY, WITHOUT LONG-TERM CURRENT USE OF INSULIN (HCC): ICD-10-CM

## 2018-10-26 RX ORDER — GLIMEPIRIDE 4 MG/1
TABLET ORAL
Qty: 30 TABLET | Refills: 3 | Status: SHIPPED | OUTPATIENT
Start: 2018-10-26 | End: 2018-12-04 | Stop reason: SDUPTHER

## 2018-11-19 DIAGNOSIS — M54.9 CHRONIC BACK PAIN, UNSPECIFIED BACK LOCATION, UNSPECIFIED BACK PAIN LATERALITY: ICD-10-CM

## 2018-11-19 DIAGNOSIS — G89.29 CHRONIC BACK PAIN, UNSPECIFIED BACK LOCATION, UNSPECIFIED BACK PAIN LATERALITY: ICD-10-CM

## 2018-11-19 DIAGNOSIS — M19.90 ARTHRITIS: ICD-10-CM

## 2018-11-19 RX ORDER — HYDROCODONE BITARTRATE AND ACETAMINOPHEN 7.5; 325 MG/1; MG/1
1 TABLET ORAL EVERY 8 HOURS PRN
Qty: 90 TABLET | Refills: 0 | Status: SHIPPED | OUTPATIENT
Start: 2018-11-19 | End: 2018-12-17 | Stop reason: SDUPTHER

## 2018-11-20 DIAGNOSIS — E11.40 TYPE 2 DIABETES MELLITUS WITH DIABETIC NEUROPATHY, WITHOUT LONG-TERM CURRENT USE OF INSULIN (HCC): ICD-10-CM

## 2018-11-20 RX ORDER — ATORVASTATIN CALCIUM 40 MG/1
TABLET, FILM COATED ORAL
Qty: 30 TABLET | Refills: 3 | Status: SHIPPED | OUTPATIENT
Start: 2018-11-20 | End: 2019-03-05 | Stop reason: SDUPTHER

## 2018-11-27 DIAGNOSIS — E11.65 TYPE 2 DIABETES MELLITUS WITH HYPERGLYCEMIA, WITHOUT LONG-TERM CURRENT USE OF INSULIN (HCC): ICD-10-CM

## 2018-11-27 RX ORDER — LANCETS 30 GAUGE
1 EACH MISCELLANEOUS 2 TIMES DAILY
Qty: 100 EACH | Refills: 3 | Status: SHIPPED | OUTPATIENT
Start: 2018-11-27 | End: 2019-10-07 | Stop reason: SDUPTHER

## 2018-12-04 ENCOUNTER — OFFICE VISIT (OUTPATIENT)
Dept: PRIMARY CARE CLINIC | Age: 53
End: 2018-12-04
Payer: MEDICAID

## 2018-12-04 VITALS
BODY MASS INDEX: 48.89 KG/M2 | OXYGEN SATURATION: 97 % | SYSTOLIC BLOOD PRESSURE: 134 MMHG | DIASTOLIC BLOOD PRESSURE: 62 MMHG | HEART RATE: 80 BPM | RESPIRATION RATE: 18 BRPM | WEIGHT: 284.8 LBS

## 2018-12-04 DIAGNOSIS — M54.9 CHRONIC NECK AND BACK PAIN: ICD-10-CM

## 2018-12-04 DIAGNOSIS — G89.29 CHRONIC NECK AND BACK PAIN: ICD-10-CM

## 2018-12-04 DIAGNOSIS — M54.2 CHRONIC NECK AND BACK PAIN: ICD-10-CM

## 2018-12-04 DIAGNOSIS — I10 ESSENTIAL HYPERTENSION: ICD-10-CM

## 2018-12-04 DIAGNOSIS — E61.1 IRON DEFICIENCY: ICD-10-CM

## 2018-12-04 DIAGNOSIS — E11.40 TYPE 2 DIABETES MELLITUS WITH DIABETIC NEUROPATHY, WITHOUT LONG-TERM CURRENT USE OF INSULIN (HCC): Primary | ICD-10-CM

## 2018-12-04 DIAGNOSIS — G57.90 NEUROPATHY OF FOOT, UNSPECIFIED LATERALITY: ICD-10-CM

## 2018-12-04 LAB — HBA1C MFR BLD: 6.6 %

## 2018-12-04 PROCEDURE — 83036 HEMOGLOBIN GLYCOSYLATED A1C: CPT | Performed by: NURSE PRACTITIONER

## 2018-12-04 PROCEDURE — 99213 OFFICE O/P EST LOW 20 MIN: CPT | Performed by: NURSE PRACTITIONER

## 2018-12-04 RX ORDER — GLIMEPIRIDE 4 MG/1
TABLET ORAL
Qty: 30 TABLET | Refills: 5 | Status: SHIPPED | OUTPATIENT
Start: 2018-12-04 | End: 2019-08-01 | Stop reason: SDUPTHER

## 2018-12-04 RX ORDER — LANOLIN ALCOHOL/MO/W.PET/CERES
CREAM (GRAM) TOPICAL
Qty: 30 TABLET | Refills: 5 | Status: SHIPPED | OUTPATIENT
Start: 2018-12-04 | End: 2019-12-30

## 2018-12-04 RX ORDER — METFORMIN HYDROCHLORIDE 500 MG/1
TABLET, EXTENDED RELEASE ORAL
Qty: 60 TABLET | Refills: 5 | Status: SHIPPED | OUTPATIENT
Start: 2018-12-04 | End: 2019-07-02 | Stop reason: SDUPTHER

## 2018-12-04 ASSESSMENT — PATIENT HEALTH QUESTIONNAIRE - PHQ9
SUM OF ALL RESPONSES TO PHQ QUESTIONS 1-9: 0
2. FEELING DOWN, DEPRESSED OR HOPELESS: 0
SUM OF ALL RESPONSES TO PHQ QUESTIONS 1-9: 0
SUM OF ALL RESPONSES TO PHQ9 QUESTIONS 1 & 2: 0
1. LITTLE INTEREST OR PLEASURE IN DOING THINGS: 0

## 2018-12-05 ENCOUNTER — TELEPHONE (OUTPATIENT)
Dept: PRIMARY CARE CLINIC | Age: 53
End: 2018-12-05

## 2018-12-16 ASSESSMENT — ENCOUNTER SYMPTOMS
EYE PAIN: 0
NAUSEA: 0
ABDOMINAL PAIN: 0
COUGH: 0
SORE THROAT: 0
SHORTNESS OF BREATH: 0
VOMITING: 0

## 2018-12-17 DIAGNOSIS — M54.9 CHRONIC BACK PAIN, UNSPECIFIED BACK LOCATION, UNSPECIFIED BACK PAIN LATERALITY: ICD-10-CM

## 2018-12-17 DIAGNOSIS — G89.29 CHRONIC BACK PAIN, UNSPECIFIED BACK LOCATION, UNSPECIFIED BACK PAIN LATERALITY: ICD-10-CM

## 2018-12-17 DIAGNOSIS — M19.90 ARTHRITIS: ICD-10-CM

## 2018-12-17 RX ORDER — HYDROCODONE BITARTRATE AND ACETAMINOPHEN 7.5; 325 MG/1; MG/1
1 TABLET ORAL EVERY 8 HOURS PRN
Qty: 90 TABLET | Refills: 0 | Status: SHIPPED | OUTPATIENT
Start: 2018-12-17 | End: 2019-01-18 | Stop reason: SDUPTHER

## 2018-12-17 NOTE — PROGRESS NOTES
exudate. Eyes: Lids are normal.   Neck: Neck supple. Cardiovascular: Normal rate, regular rhythm and normal heart sounds. Pulmonary/Chest: Effort normal and breath sounds normal.   Abdominal: Soft. Bowel sounds are normal. She exhibits no distension. There is no tenderness. Musculoskeletal: She exhibits no edema. Lymphadenopathy:     She has no cervical adenopathy. Neurological: She is alert and oriented to person, place, and time. Skin: Skin is warm and dry. Psychiatric: She has a normal mood and affect. Vitals reviewed. No results found for requested labs within last 30 days. Hemoglobin A1C (%)   Date Value   12/04/2018 6.6     Microscopic Examination (no units)   Date Value   04/13/2015 YES     Microalbumin, Random Urine (mg/dL)   Date Value   03/09/2018 <1.20     LDL Calculated (mg/dL)   Date Value   09/01/2018 48         Lab Results   Component Value Date    WBC 8.4 03/09/2018    NEUTROABS 5.3 09/11/2015    HGB 13.4 03/09/2018    HCT 42.4 03/09/2018    MCV 96.1 03/09/2018     03/09/2018       Lab Results   Component Value Date    TSH 3.93 04/13/2015       Prior to Visit Medications    Medication Sig Taking?  Authorizing Provider   ferrous sulfate 325 (65 Fe) MG EC tablet TAKE 1 TABLET BY MOUTH ONCE DAILY WITH BREAKFAST Yes JACQUELYN Michaud   metFORMIN (GLUCOPHAGE-XR) 500 MG extended release tablet 1 ac breakfast and supper Yes JACQUELYN Michaud   metoprolol tartrate (LOPRESSOR) 25 MG tablet TAKE ONE HALF TABLET BY MOUTH TWO TIMES DAILY Yes JACQUELYN Michaud   glimepiride (AMARYL) 4 MG tablet take 1 tablet by mouth every morning BEFORE BREAKFAST Yes JACQUELYN Michaud   blood glucose test strips (ONE TOUCH TEST STRIPS) strip 1 each by In Vitro route 2 times daily Dx e 11.9 Yes JACQUELYN Michaud   Lancets MISC 1 each by Does not apply route 2 times daily Dx e 11.9 Yes JACQUELYN Michaud   atorvastatin (LIPITOR) 40 MG tablet take 1 tablet by mouth at bedtime Yes

## 2018-12-18 DIAGNOSIS — M54.9 CHRONIC NECK AND BACK PAIN: ICD-10-CM

## 2018-12-18 DIAGNOSIS — M54.2 CHRONIC NECK AND BACK PAIN: ICD-10-CM

## 2018-12-18 DIAGNOSIS — G89.29 CHRONIC NECK AND BACK PAIN: ICD-10-CM

## 2018-12-18 DIAGNOSIS — G57.90 NEUROPATHY OF FOOT, UNSPECIFIED LATERALITY: ICD-10-CM

## 2019-01-07 ENCOUNTER — OFFICE VISIT (OUTPATIENT)
Dept: PRIMARY CARE CLINIC | Age: 54
End: 2019-01-07
Payer: MEDICAID

## 2019-01-07 VITALS
DIASTOLIC BLOOD PRESSURE: 58 MMHG | WEIGHT: 285 LBS | HEART RATE: 56 BPM | SYSTOLIC BLOOD PRESSURE: 110 MMHG | OXYGEN SATURATION: 95 % | RESPIRATION RATE: 18 BRPM | BODY MASS INDEX: 48.92 KG/M2

## 2019-01-07 DIAGNOSIS — R05.9 COUGH: ICD-10-CM

## 2019-01-07 DIAGNOSIS — J06.9 UPPER RESPIRATORY TRACT INFECTION, UNSPECIFIED TYPE: Primary | ICD-10-CM

## 2019-01-07 LAB
INFLUENZA A ANTIBODY: NORMAL
INFLUENZA B ANTIBODY: NORMAL

## 2019-01-07 PROCEDURE — 87804 INFLUENZA ASSAY W/OPTIC: CPT | Performed by: NURSE PRACTITIONER

## 2019-01-07 PROCEDURE — 99213 OFFICE O/P EST LOW 20 MIN: CPT | Performed by: NURSE PRACTITIONER

## 2019-01-07 RX ORDER — FLUTICASONE PROPIONATE 50 MCG
1 SPRAY, SUSPENSION (ML) NASAL DAILY
Qty: 2 BOTTLE | Refills: 1 | Status: SHIPPED | OUTPATIENT
Start: 2019-01-07 | End: 2019-09-05 | Stop reason: SDUPTHER

## 2019-01-07 RX ORDER — BENZONATATE 100 MG/1
100 CAPSULE ORAL 3 TIMES DAILY PRN
Qty: 30 CAPSULE | Refills: 0 | Status: SHIPPED | OUTPATIENT
Start: 2019-01-07 | End: 2020-02-10 | Stop reason: SDUPTHER

## 2019-01-07 RX ORDER — PREDNISONE 10 MG/1
10 TABLET ORAL 2 TIMES DAILY
Qty: 10 TABLET | Refills: 0 | Status: SHIPPED | OUTPATIENT
Start: 2019-01-07 | End: 2019-01-12

## 2019-01-07 RX ORDER — DOXYCYCLINE HYCLATE 100 MG
100 TABLET ORAL 2 TIMES DAILY
Qty: 14 TABLET | Refills: 0 | Status: SHIPPED | OUTPATIENT
Start: 2019-01-07 | End: 2019-01-14

## 2019-01-07 RX ORDER — MONTELUKAST SODIUM 10 MG/1
10 TABLET ORAL NIGHTLY
Qty: 30 TABLET | Refills: 3 | Status: SHIPPED | OUTPATIENT
Start: 2019-01-07 | End: 2019-03-05 | Stop reason: SDUPTHER

## 2019-01-07 ASSESSMENT — ENCOUNTER SYMPTOMS
SORE THROAT: 1
SINUS PRESSURE: 1
SHORTNESS OF BREATH: 1
SINUS PAIN: 1
BACK PAIN: 0
WHEEZING: 0
COUGH: 1

## 2019-01-09 ENCOUNTER — NURSE ONLY (OUTPATIENT)
Dept: PRIMARY CARE CLINIC | Age: 54
End: 2019-01-09

## 2019-01-09 VITALS — DIASTOLIC BLOOD PRESSURE: 76 MMHG | SYSTOLIC BLOOD PRESSURE: 121 MMHG

## 2019-01-10 ENCOUNTER — TELEPHONE (OUTPATIENT)
Dept: PRIMARY CARE CLINIC | Age: 54
End: 2019-01-10

## 2019-01-10 DIAGNOSIS — D64.9 ANEMIA, UNSPECIFIED TYPE: Primary | ICD-10-CM

## 2019-01-11 ENCOUNTER — HOSPITAL ENCOUNTER (OUTPATIENT)
Facility: HOSPITAL | Age: 54
Discharge: HOME | End: 2019-01-11
Payer: MEDICAID

## 2019-01-11 DIAGNOSIS — D64.9 ANEMIA, UNSPECIFIED TYPE: ICD-10-CM

## 2019-01-11 DIAGNOSIS — E11.40 TYPE 2 DIABETES MELLITUS WITH DIABETIC NEUROPATHY, WITHOUT LONG-TERM CURRENT USE OF INSULIN (HCC): ICD-10-CM

## 2019-01-11 LAB
A/G RATIO: 1.4 (ref 0.8–2)
ALBUMIN SERPL-MCNC: 4.4 G/DL (ref 3.4–4.8)
ALP BLD-CCNC: 103 U/L (ref 25–100)
ALT SERPL-CCNC: 14 U/L (ref 4–36)
ANION GAP SERPL CALCULATED.3IONS-SCNC: 14 MMOL/L (ref 3–16)
AST SERPL-CCNC: 9 U/L (ref 8–33)
BASOPHILS ABSOLUTE: 0.1 K/UL (ref 0–0.1)
BASOPHILS RELATIVE PERCENT: 0.4 %
BILIRUB SERPL-MCNC: 0.6 MG/DL (ref 0.3–1.2)
BUN BLDV-MCNC: 28 MG/DL (ref 6–20)
CALCIUM SERPL-MCNC: 10.1 MG/DL (ref 8.5–10.5)
CHLORIDE BLD-SCNC: 100 MMOL/L (ref 98–107)
CHOLESTEROL, TOTAL: 119 MG/DL (ref 0–200)
CO2: 27 MMOL/L (ref 20–30)
CREAT SERPL-MCNC: 1.1 MG/DL (ref 0.4–1.2)
EOSINOPHILS ABSOLUTE: 0.1 K/UL (ref 0–0.4)
EOSINOPHILS RELATIVE PERCENT: 1 %
FERRITIN: 214.2 NG/ML (ref 22–322)
GFR AFRICAN AMERICAN: >59
GFR NON-AFRICAN AMERICAN: 51
GLOBULIN: 3.2 G/DL
GLUCOSE BLD-MCNC: 99 MG/DL (ref 74–106)
HBA1C MFR BLD: 6.3 %
HCT VFR BLD CALC: 46.8 % (ref 37–47)
HDLC SERPL-MCNC: 52 MG/DL (ref 40–60)
HEMOGLOBIN: 14.5 G/DL (ref 11.5–16.5)
IMMATURE GRANULOCYTES #: 0.1 K/UL
IMMATURE GRANULOCYTES %: 0.6 % (ref 0–5)
IRON: 79 UG/DL (ref 37–145)
LDL CHOLESTEROL CALCULATED: 49 MG/DL
LYMPHOCYTES ABSOLUTE: 2.6 K/UL (ref 1.5–4)
LYMPHOCYTES RELATIVE PERCENT: 19.3 %
MCH RBC QN AUTO: 30.2 PG (ref 27–32)
MCHC RBC AUTO-ENTMCNC: 31 G/DL (ref 31–35)
MCV RBC AUTO: 97.5 FL (ref 80–100)
MONOCYTES ABSOLUTE: 1 K/UL (ref 0.2–0.8)
MONOCYTES RELATIVE PERCENT: 7.4 %
NEUTROPHILS ABSOLUTE: 9.7 K/UL (ref 2–7.5)
NEUTROPHILS RELATIVE PERCENT: 71.3 %
PDW BLD-RTO: 13.3 % (ref 11–16)
PLATELET # BLD: 327 K/UL (ref 150–400)
PMV BLD AUTO: 11 FL (ref 6–10)
POTASSIUM SERPL-SCNC: 4.9 MMOL/L (ref 3.4–5.1)
RBC # BLD: 4.8 M/UL (ref 3.8–5.8)
SODIUM BLD-SCNC: 141 MMOL/L (ref 136–145)
TOTAL IRON BINDING CAPACITY: 384 UG/DL (ref 250–450)
TOTAL PROTEIN: 7.6 G/DL (ref 6.4–8.3)
TRIGL SERPL-MCNC: 89 MG/DL (ref 0–249)
VLDLC SERPL CALC-MCNC: 18 MG/DL
WBC # BLD: 13.6 K/UL (ref 4–11)

## 2019-01-11 PROCEDURE — 83540 ASSAY OF IRON: CPT

## 2019-01-11 PROCEDURE — 80053 COMPREHEN METABOLIC PANEL: CPT

## 2019-01-11 PROCEDURE — 83036 HEMOGLOBIN GLYCOSYLATED A1C: CPT

## 2019-01-11 PROCEDURE — 83550 IRON BINDING TEST: CPT

## 2019-01-11 PROCEDURE — 85025 COMPLETE CBC W/AUTO DIFF WBC: CPT

## 2019-01-11 PROCEDURE — 80061 LIPID PANEL: CPT

## 2019-01-11 PROCEDURE — 82728 ASSAY OF FERRITIN: CPT

## 2019-01-15 ENCOUNTER — OFFICE VISIT (OUTPATIENT)
Dept: PULMONOLOGY | Facility: CLINIC | Age: 54
End: 2019-01-15

## 2019-01-15 VITALS
SYSTOLIC BLOOD PRESSURE: 128 MMHG | RESPIRATION RATE: 16 BRPM | DIASTOLIC BLOOD PRESSURE: 84 MMHG | HEART RATE: 70 BPM | BODY MASS INDEX: 47.97 KG/M2 | OXYGEN SATURATION: 95 % | HEIGHT: 64 IN | WEIGHT: 281 LBS

## 2019-01-15 DIAGNOSIS — J44.9 CHRONIC OBSTRUCTIVE PULMONARY DISEASE, UNSPECIFIED COPD TYPE (HCC): ICD-10-CM

## 2019-01-15 DIAGNOSIS — E66.01 MORBID OBESITY, UNSPECIFIED OBESITY TYPE (HCC): ICD-10-CM

## 2019-01-15 DIAGNOSIS — I10 ESSENTIAL HYPERTENSION: ICD-10-CM

## 2019-01-15 DIAGNOSIS — G47.33 OBSTRUCTIVE SLEEP APNEA: Primary | ICD-10-CM

## 2019-01-15 DIAGNOSIS — E11.40 TYPE 2 DIABETES MELLITUS WITH DIABETIC NEUROPATHY, WITHOUT LONG-TERM CURRENT USE OF INSULIN (HCC): ICD-10-CM

## 2019-01-15 PROCEDURE — 99214 OFFICE O/P EST MOD 30 MIN: CPT | Performed by: NURSE PRACTITIONER

## 2019-01-15 RX ORDER — FLUTICASONE PROPIONATE 50 MCG
1 SPRAY, SUSPENSION (ML) NASAL DAILY
Qty: 1 BOTTLE | Refills: 5 | Status: SHIPPED | OUTPATIENT
Start: 2019-01-15 | End: 2019-11-19 | Stop reason: SDUPTHER

## 2019-01-15 RX ORDER — LISINOPRIL 5 MG/1
TABLET ORAL
Qty: 30 TABLET | Refills: 5 | Status: SHIPPED | OUTPATIENT
Start: 2019-01-15 | End: 2019-06-05 | Stop reason: SDUPTHER

## 2019-01-15 NOTE — PROGRESS NOTES
"Chief Complaint   Patient presents with   • Follow-up   • Shortness of Breath         Subjective   Chante Wetzel is a 53 y.o. female.     History of Present Illness   The patient comes in today for follow-up of COPD and obstructive sleep apnea.    She has had a upper respiratory infection and has not been able to use the CPAP nightly.  She was treated by her primary care provider with antibiotics and steroids and reports she is feeling much better except for the nasal pressure.  This has kept her from being able to use the CPAP machine.    She decided not to have bariatric surgery because her  does not want her to have the surgery.  She has been cutting calories and has lost 38 pounds since her last visit.    She is using Anoro daily.  She has not needed to use a rescue inhaler and actually states she does not even have one.  She has a nebulizer at home but only uses this when she is sick.  She has been started on Singulair which she takes every night.    She uses Flonase on a daily basis.    The following portions of the patient's history were reviewed and updated as appropriate: allergies, current medications, past family history, past medical history, past social history and past surgical history.    Review of Systems   HENT: Positive for sinus pressure. Negative for sneezing and sore throat.    Respiratory: Negative for cough, shortness of breath and wheezing.        Objective   Visit Vitals  /84 (BP Location: Left arm, Patient Position: Sitting)   Pulse 70   Resp 16   Ht 162.6 cm (64.02\")   Wt 127 kg (281 lb)   SpO2 95%   BMI 48.21 kg/m²     Physical Exam   Constitutional: She is oriented to person, place, and time. She appears well-developed and well-nourished.   HENT:   Head: Normocephalic and atraumatic.   Crowded oropharynx. Dentures present.   Eyes: EOM are normal.   Neck: Neck supple.   Cardiovascular: Normal rate and regular rhythm.   Pulmonary/Chest: Effort normal. No respiratory " distress.   Somewhat decreased A/E without wheezing noted.   Abdominal:   Obese    Musculoskeletal: She exhibits no edema.   Gait normal.   Neurological: She is alert and oriented to person, place, and time.   Skin: Skin is warm and dry.   Psychiatric: She has a normal mood and affect.   Vitals reviewed.          Assessment/Plan   Chante was seen today for follow-up and shortness of breath.    Diagnoses and all orders for this visit:    Obstructive sleep apnea    Chronic obstructive pulmonary disease, unspecified COPD type (CMS/HCC)    Morbid obesity, unspecified obesity type (CMS/HCC)    Other orders  -     umeclidinium-vilanterol (ANORO ELLIPTA) 62.5-25 MCG/INH aerosol powder  inhaler; Inhale 1 puff Daily.  -     fluticasone (FLONASE) 50 MCG/ACT nasal spray; 1 spray into the nostril(s) as directed by provider Daily.           Return in about 6 months (around 7/15/2019) for Recheck, For Dr. Mccormack.    DISCUSSION (if any):  Overall her COPD seems to be stable with her current medications.  I have asked her to continue using her medications as discussed above.  I have made no changes to her medications today.    I've asked her to resume using CPAP at the current pressure of 13 as soon as possible.  She is compliant with CPAP use at 86%.    I have congratulated her on her weight loss and encouraged her to continue working hard to lose weight.      Dictated utilizing Dragon dictation.    This document was electronically signed by JOHNNIE Corado January 15, 2019  1:45 PM

## 2019-01-18 DIAGNOSIS — G89.29 CHRONIC BACK PAIN, UNSPECIFIED BACK LOCATION, UNSPECIFIED BACK PAIN LATERALITY: ICD-10-CM

## 2019-01-18 DIAGNOSIS — M19.90 ARTHRITIS: ICD-10-CM

## 2019-01-18 DIAGNOSIS — M54.9 CHRONIC BACK PAIN, UNSPECIFIED BACK LOCATION, UNSPECIFIED BACK PAIN LATERALITY: ICD-10-CM

## 2019-01-18 RX ORDER — HYDROCODONE BITARTRATE AND ACETAMINOPHEN 7.5; 325 MG/1; MG/1
1 TABLET ORAL EVERY 8 HOURS PRN
Qty: 90 TABLET | Refills: 0 | Status: SHIPPED | OUTPATIENT
Start: 2019-01-18 | End: 2019-02-13 | Stop reason: SDUPTHER

## 2019-01-30 ENCOUNTER — TELEPHONE (OUTPATIENT)
Dept: PRIMARY CARE CLINIC | Age: 54
End: 2019-01-30

## 2019-02-06 ENCOUNTER — OFFICE VISIT (OUTPATIENT)
Dept: PRIMARY CARE CLINIC | Age: 54
End: 2019-02-06
Payer: MEDICAID

## 2019-02-06 VITALS
HEART RATE: 69 BPM | DIASTOLIC BLOOD PRESSURE: 60 MMHG | WEIGHT: 281 LBS | BODY MASS INDEX: 47.97 KG/M2 | HEIGHT: 64 IN | RESPIRATION RATE: 16 BRPM | SYSTOLIC BLOOD PRESSURE: 134 MMHG | OXYGEN SATURATION: 96 %

## 2019-02-06 DIAGNOSIS — Z23 NEED FOR PNEUMOCOCCAL VACCINATION: ICD-10-CM

## 2019-02-06 DIAGNOSIS — L03.116 CELLULITIS OF LEFT LOWER EXTREMITY: Primary | ICD-10-CM

## 2019-02-06 DIAGNOSIS — E11.40 TYPE 2 DIABETES MELLITUS WITH DIABETIC NEUROPATHY, WITHOUT LONG-TERM CURRENT USE OF INSULIN (HCC): ICD-10-CM

## 2019-02-06 DIAGNOSIS — H60.543 ECZEMA OF BOTH EXTERNAL EARS: ICD-10-CM

## 2019-02-06 PROCEDURE — 90471 IMMUNIZATION ADMIN: CPT | Performed by: PEDIATRICS

## 2019-02-06 PROCEDURE — 99213 OFFICE O/P EST LOW 20 MIN: CPT | Performed by: PEDIATRICS

## 2019-02-06 PROCEDURE — 90732 PPSV23 VACC 2 YRS+ SUBQ/IM: CPT | Performed by: PEDIATRICS

## 2019-02-06 RX ORDER — TRIAMCINOLONE ACETONIDE 1 MG/G
CREAM TOPICAL
Qty: 30 G | Refills: 0 | Status: SHIPPED | OUTPATIENT
Start: 2019-02-06 | End: 2019-03-07 | Stop reason: SDUPTHER

## 2019-02-06 RX ORDER — CEPHALEXIN 500 MG/1
500 CAPSULE ORAL 2 TIMES DAILY
Qty: 20 CAPSULE | Refills: 0 | Status: SHIPPED | OUTPATIENT
Start: 2019-02-06 | End: 2019-12-05

## 2019-02-06 ASSESSMENT — ENCOUNTER SYMPTOMS
BACK PAIN: 0
SINUS PRESSURE: 0
VOMITING: 0
WHEEZING: 0
NAUSEA: 0
COUGH: 0
EYE DISCHARGE: 0
ABDOMINAL PAIN: 0
SORE THROAT: 0
SHORTNESS OF BREATH: 0

## 2019-02-12 DIAGNOSIS — I10 ESSENTIAL HYPERTENSION: ICD-10-CM

## 2019-02-12 RX ORDER — BUMETANIDE 1 MG/1
TABLET ORAL
Qty: 30 TABLET | Refills: 5 | Status: SHIPPED | OUTPATIENT
Start: 2019-02-12 | End: 2019-11-24 | Stop reason: SDUPTHER

## 2019-02-13 DIAGNOSIS — M19.90 ARTHRITIS: ICD-10-CM

## 2019-02-13 DIAGNOSIS — G89.29 CHRONIC BACK PAIN, UNSPECIFIED BACK LOCATION, UNSPECIFIED BACK PAIN LATERALITY: ICD-10-CM

## 2019-02-13 DIAGNOSIS — M54.9 CHRONIC BACK PAIN, UNSPECIFIED BACK LOCATION, UNSPECIFIED BACK PAIN LATERALITY: ICD-10-CM

## 2019-02-14 RX ORDER — HYDROCODONE BITARTRATE AND ACETAMINOPHEN 7.5; 325 MG/1; MG/1
1 TABLET ORAL EVERY 8 HOURS PRN
Qty: 90 TABLET | Refills: 0 | Status: SHIPPED | OUTPATIENT
Start: 2019-02-14 | End: 2019-03-13 | Stop reason: SDUPTHER

## 2019-02-28 ENCOUNTER — TELEPHONE (OUTPATIENT)
Dept: PRIMARY CARE CLINIC | Age: 54
End: 2019-02-28

## 2019-03-05 ENCOUNTER — OFFICE VISIT (OUTPATIENT)
Dept: PRIMARY CARE CLINIC | Age: 54
End: 2019-03-05
Payer: MEDICAID

## 2019-03-05 VITALS
HEART RATE: 88 BPM | DIASTOLIC BLOOD PRESSURE: 76 MMHG | BODY MASS INDEX: 48.75 KG/M2 | OXYGEN SATURATION: 94 % | SYSTOLIC BLOOD PRESSURE: 118 MMHG | WEIGHT: 284 LBS

## 2019-03-05 DIAGNOSIS — M54.9 CHRONIC NECK AND BACK PAIN: ICD-10-CM

## 2019-03-05 DIAGNOSIS — R23.4 CRACKED SKIN ON FEET: ICD-10-CM

## 2019-03-05 DIAGNOSIS — J43.1 PANLOBULAR EMPHYSEMA (HCC): ICD-10-CM

## 2019-03-05 DIAGNOSIS — E78.5 HYPERLIPIDEMIA, UNSPECIFIED HYPERLIPIDEMIA TYPE: ICD-10-CM

## 2019-03-05 DIAGNOSIS — M54.2 CHRONIC NECK AND BACK PAIN: ICD-10-CM

## 2019-03-05 DIAGNOSIS — G89.29 CHRONIC NECK AND BACK PAIN: ICD-10-CM

## 2019-03-05 DIAGNOSIS — E11.40 TYPE 2 DIABETES MELLITUS WITH DIABETIC NEUROPATHY, WITHOUT LONG-TERM CURRENT USE OF INSULIN (HCC): Primary | ICD-10-CM

## 2019-03-05 PROCEDURE — 99214 OFFICE O/P EST MOD 30 MIN: CPT | Performed by: NURSE PRACTITIONER

## 2019-03-05 RX ORDER — ATORVASTATIN CALCIUM 40 MG/1
TABLET, FILM COATED ORAL
Qty: 30 TABLET | Refills: 5 | Status: SHIPPED | OUTPATIENT
Start: 2019-03-05 | End: 2019-08-29 | Stop reason: SDUPTHER

## 2019-03-05 RX ORDER — TIZANIDINE 4 MG/1
TABLET ORAL
Qty: 90 TABLET | Refills: 5 | Status: SHIPPED | OUTPATIENT
Start: 2019-03-05 | End: 2019-11-01 | Stop reason: SDUPTHER

## 2019-03-05 RX ORDER — MONTELUKAST SODIUM 10 MG/1
10 TABLET ORAL NIGHTLY
Qty: 30 TABLET | Refills: 5 | Status: SHIPPED | OUTPATIENT
Start: 2019-03-05 | End: 2019-10-22 | Stop reason: SDUPTHER

## 2019-03-05 ASSESSMENT — ENCOUNTER SYMPTOMS
COUGH: 0
SORE THROAT: 0
SHORTNESS OF BREATH: 0
VOMITING: 0
EYE PAIN: 0
NAUSEA: 0
ABDOMINAL PAIN: 0

## 2019-03-06 ASSESSMENT — ENCOUNTER SYMPTOMS: BACK PAIN: 1

## 2019-03-07 DIAGNOSIS — H60.543 ECZEMA OF BOTH EXTERNAL EARS: ICD-10-CM

## 2019-03-08 RX ORDER — TRIAMCINOLONE ACETONIDE 1 MG/G
CREAM TOPICAL
Qty: 30 G | Refills: 0 | Status: SHIPPED | OUTPATIENT
Start: 2019-03-08 | End: 2020-03-03

## 2019-03-11 DIAGNOSIS — M54.9 CHRONIC NECK AND BACK PAIN: ICD-10-CM

## 2019-03-11 DIAGNOSIS — M54.2 CHRONIC NECK AND BACK PAIN: ICD-10-CM

## 2019-03-11 DIAGNOSIS — G89.29 CHRONIC NECK AND BACK PAIN: ICD-10-CM

## 2019-03-11 DIAGNOSIS — G57.90 NEUROPATHY OF FOOT, UNSPECIFIED LATERALITY: ICD-10-CM

## 2019-03-12 RX ORDER — PREGABALIN 100 MG/1
CAPSULE ORAL
Qty: 60 CAPSULE | Refills: 5 | Status: SHIPPED | OUTPATIENT
Start: 2019-03-12 | End: 2019-09-05 | Stop reason: SDUPTHER

## 2019-03-13 DIAGNOSIS — M19.90 ARTHRITIS: ICD-10-CM

## 2019-03-13 DIAGNOSIS — G89.29 CHRONIC BACK PAIN, UNSPECIFIED BACK LOCATION, UNSPECIFIED BACK PAIN LATERALITY: ICD-10-CM

## 2019-03-13 DIAGNOSIS — M54.9 CHRONIC BACK PAIN, UNSPECIFIED BACK LOCATION, UNSPECIFIED BACK PAIN LATERALITY: ICD-10-CM

## 2019-03-13 RX ORDER — HYDROCODONE BITARTRATE AND ACETAMINOPHEN 7.5; 325 MG/1; MG/1
1 TABLET ORAL EVERY 8 HOURS PRN
Qty: 90 TABLET | Refills: 0 | Status: SHIPPED | OUTPATIENT
Start: 2019-03-13 | End: 2019-04-10 | Stop reason: SDUPTHER

## 2019-03-14 ENCOUNTER — OFFICE VISIT (OUTPATIENT)
Dept: PULMONOLOGY | Facility: CLINIC | Age: 54
End: 2019-03-14

## 2019-03-14 VITALS
RESPIRATION RATE: 18 BRPM | DIASTOLIC BLOOD PRESSURE: 84 MMHG | HEIGHT: 64 IN | HEART RATE: 70 BPM | WEIGHT: 281 LBS | SYSTOLIC BLOOD PRESSURE: 128 MMHG | BODY MASS INDEX: 47.97 KG/M2 | OXYGEN SATURATION: 90 %

## 2019-03-14 DIAGNOSIS — G47.34 NOCTURNAL HYPOXIA: ICD-10-CM

## 2019-03-14 DIAGNOSIS — J44.9 CHRONIC OBSTRUCTIVE PULMONARY DISEASE, UNSPECIFIED COPD TYPE (HCC): Primary | ICD-10-CM

## 2019-03-14 DIAGNOSIS — G47.33 OBSTRUCTIVE SLEEP APNEA: ICD-10-CM

## 2019-03-14 DIAGNOSIS — E66.01 MORBID OBESITY, UNSPECIFIED OBESITY TYPE (HCC): ICD-10-CM

## 2019-03-14 DIAGNOSIS — R09.02 EXERCISE HYPOXEMIA: ICD-10-CM

## 2019-03-14 PROCEDURE — 99214 OFFICE O/P EST MOD 30 MIN: CPT | Performed by: NURSE PRACTITIONER

## 2019-03-14 PROCEDURE — 94618 PULMONARY STRESS TESTING: CPT | Performed by: NURSE PRACTITIONER

## 2019-03-14 NOTE — PROGRESS NOTES
"Chief Complaint   Patient presents with   • Follow-up   • Shortness of Breath         Subjective   Chante Wetzel is a 53 y.o. female.     History of Present Illness   The patient comes in today for follow-up of COPD and obstructive sleep apnea.    She is using Anoro daily.  She has not needed to use the rescue inhaler or the nebulizer.    She feels her exercise tolerance is about the same.  Upon further questioning she does report shortness of breath with activity.    She reports having a respiratory illness about 3 months ago and was given antibiotics and steroids which resolved the symptoms.    She is using CPAP at a pressure of 13 with oxygen bled in at night.    The following portions of the patient's history were reviewed and updated as appropriate: allergies, current medications, past family history, past medical history, past social history and past surgical history.    Review of Systems   HENT: Positive for sinus pressure. Negative for sneezing and sore throat.    Respiratory: Negative for cough, shortness of breath and wheezing.        Objective   Visit Vitals  /84   Pulse 70   Resp 18   Ht 162.6 cm (64.02\")   Wt 127 kg (281 lb)   SpO2 90% Comment: on room air  (Rest)   BMI 48.21 kg/m²     Physical Exam   Constitutional: She is oriented to person, place, and time. She appears well-developed and well-nourished.   HENT:   Head: Normocephalic and atraumatic.   Eyes: EOM are normal.   Neck: Neck supple.   Cardiovascular: Normal rate and regular rhythm.   Pulmonary/Chest: Effort normal. No respiratory distress.   Somewhat decreased A/E without wheezing noted.   Musculoskeletal: She exhibits no edema.   Neurological: She is alert and oriented to person, place, and time.   Skin: Skin is warm and dry.   Psychiatric: She has a normal mood and affect.   Vitals reviewed.          Assessment/Plan   Chante was seen today for follow-up and shortness of breath.    Diagnoses and all orders for this " visit:    Chronic obstructive pulmonary disease, unspecified COPD type (CMS/HCC)  -     Oxygen Therapy    Morbid obesity, unspecified obesity type (CMS/HCC)    Obstructive sleep apnea    Nocturnal hypoxia  -     Oxygen Therapy    Exercise hypoxemia  -     Oxygen Therapy           Return in about 5 months (around 8/14/2019), or cancel appt for June or July, for Recheck, For Dr. Mccormack.    DISCUSSION (if any):  On 6-minute walk, her oxygen saturation decreased to 85% on room air within 1 minute of walking.  2 L of oxygen were added and she recovered to 89% so the oxygen was increased to 3 L/min and she increased to 91% and remained greater than 90% for the duration of the walk on 3 L/min of oxygen.  Her total walk distance was 180 m.  She was not able to walk more due to back pain.  She definitely needs to use oxygen with activity and continue using it bled into her CPAP machine at night.    I have asked her to continue using Anoro daily and the rescue medication as needed for increased shortness of breath and wheezing.    She is also benefited from Flonase so I have asked her to continue using it on a regular basis.    She should definitely continue using CPAP at the current pressure of 13.  She is compliant with CPAP use at greater than 85%.    Dictated utilizing Dragon dictation.    This document was electronically signed by JOHNNIE Corado March 14, 2019  11:59 AM

## 2019-04-05 ENCOUNTER — TELEPHONE (OUTPATIENT)
Dept: PRIMARY CARE CLINIC | Age: 54
End: 2019-04-05

## 2019-04-05 NOTE — TELEPHONE ENCOUNTER
Patient came to the office today for more samples of Eliquis 2.5 mg. This was prescribed by jayesh Ureña. Patient was given 4 box/boxes. Qty. 56, Lot # [id number], Exp. Date 4/20.

## 2019-04-10 DIAGNOSIS — M19.90 ARTHRITIS: ICD-10-CM

## 2019-04-10 DIAGNOSIS — M54.9 CHRONIC BACK PAIN, UNSPECIFIED BACK LOCATION, UNSPECIFIED BACK PAIN LATERALITY: ICD-10-CM

## 2019-04-10 DIAGNOSIS — G89.29 CHRONIC BACK PAIN, UNSPECIFIED BACK LOCATION, UNSPECIFIED BACK PAIN LATERALITY: ICD-10-CM

## 2019-04-10 RX ORDER — HYDROCODONE BITARTRATE AND ACETAMINOPHEN 7.5; 325 MG/1; MG/1
1 TABLET ORAL EVERY 8 HOURS PRN
Qty: 90 TABLET | Refills: 0 | Status: SHIPPED | OUTPATIENT
Start: 2019-04-10 | End: 2019-05-09 | Stop reason: SDUPTHER

## 2019-05-02 ENCOUNTER — TELEPHONE (OUTPATIENT)
Dept: PRIMARY CARE CLINIC | Age: 54
End: 2019-05-02

## 2019-05-02 NOTE — TELEPHONE ENCOUNTER
Patient came to the office today for more samples of Eliquis 2.5 mg. This was prescribed by jayesh Hunter. Patient was given 4 box/boxes. Qty. 56, Lot # [id number], Exp. Date 4/2020.

## 2019-05-09 ENCOUNTER — OFFICE VISIT (OUTPATIENT)
Dept: PRIMARY CARE CLINIC | Age: 54
End: 2019-05-09
Payer: MEDICAID

## 2019-05-09 VITALS
OXYGEN SATURATION: 91 % | HEART RATE: 62 BPM | SYSTOLIC BLOOD PRESSURE: 104 MMHG | DIASTOLIC BLOOD PRESSURE: 60 MMHG | WEIGHT: 278 LBS | BODY MASS INDEX: 47.72 KG/M2

## 2019-05-09 DIAGNOSIS — G89.29 CHRONIC BACK PAIN, UNSPECIFIED BACK LOCATION, UNSPECIFIED BACK PAIN LATERALITY: ICD-10-CM

## 2019-05-09 DIAGNOSIS — M19.90 ARTHRITIS: ICD-10-CM

## 2019-05-09 DIAGNOSIS — M25.551 RIGHT HIP PAIN: Primary | ICD-10-CM

## 2019-05-09 DIAGNOSIS — M54.31 RIGHT SIDED SCIATICA: ICD-10-CM

## 2019-05-09 DIAGNOSIS — M54.9 CHRONIC BACK PAIN, UNSPECIFIED BACK LOCATION, UNSPECIFIED BACK PAIN LATERALITY: ICD-10-CM

## 2019-05-09 PROCEDURE — 99213 OFFICE O/P EST LOW 20 MIN: CPT | Performed by: NURSE PRACTITIONER

## 2019-05-09 RX ORDER — METHYLPREDNISOLONE ACETATE 80 MG/ML
80 INJECTION, SUSPENSION INTRA-ARTICULAR; INTRALESIONAL; INTRAMUSCULAR; SOFT TISSUE ONCE
Status: COMPLETED | OUTPATIENT
Start: 2019-05-09 | End: 2019-05-09

## 2019-05-09 RX ORDER — HYDROCODONE BITARTRATE AND ACETAMINOPHEN 7.5; 325 MG/1; MG/1
1 TABLET ORAL EVERY 8 HOURS PRN
Qty: 90 TABLET | Refills: 0 | Status: SHIPPED | OUTPATIENT
Start: 2019-05-09 | End: 2019-06-05 | Stop reason: SDUPTHER

## 2019-05-09 RX ADMIN — METHYLPREDNISOLONE ACETATE 80 MG: 80 INJECTION, SUSPENSION INTRA-ARTICULAR; INTRALESIONAL; INTRAMUSCULAR; SOFT TISSUE at 18:23

## 2019-05-09 ASSESSMENT — ENCOUNTER SYMPTOMS
NAUSEA: 0
ABDOMINAL PAIN: 0
SHORTNESS OF BREATH: 0
EYE PAIN: 0
COUGH: 0
SORE THROAT: 0
VOMITING: 0

## 2019-05-09 NOTE — PROGRESS NOTES
Have you seen any other physician or provider since your last visit? no    Have you had any other diagnostic tests since your last visit? no    Have you changed or stopped any medications since your last visit including any over-the-counter medicines, vitamins, or herbal medicines? no     Are you taking all your prescribed medications? Yes  If NO, why? -  N/A      REVIEW OF SYSTEMS:  Review of Systems   Constitutional: Negative for chills and fever. HENT: Negative for ear pain and sore throat. Eyes: Negative for pain and visual disturbance. Respiratory: Negative for cough and shortness of breath. Cardiovascular: Negative for chest pain, palpitations and leg swelling. Gastrointestinal: Negative for abdominal pain, nausea and vomiting. Genitourinary: Negative for dysuria and hematuria. Musculoskeletal: Negative for joint swelling. Hip pain   Skin: Negative for rash. Neurological: Negative for dizziness and weakness. Psychiatric/Behavioral: Negative for sleep disturbance.

## 2019-05-14 NOTE — PROGRESS NOTES
sulfate 325 (65 Fe) MG EC tablet TAKE 1 TABLET BY MOUTH ONCE DAILY WITH BREAKFAST Yes JACQUELYN Almaguer   metFORMIN (GLUCOPHAGE-XR) 500 MG extended release tablet 1 ac breakfast and supper Yes JACQUELYN Almaguer   metoprolol tartrate (LOPRESSOR) 25 MG tablet TAKE ONE HALF TABLET BY MOUTH TWO TIMES DAILY Yes JACQUELYN Almaguer   glimepiride (AMARYL) 4 MG tablet take 1 tablet by mouth every morning BEFORE BREAKFAST Yes JACQUELYN Almaguer   blood glucose test strips (ONE TOUCH TEST STRIPS) strip 1 each by In Vitro route 2 times daily Dx e 11.9 Yes JACQUELYN Almaguer   Lancets MISC 1 each by Does not apply route 2 times daily Dx e 11.9 Yes JACQUELYN Almaguer   diclofenac sodium (VOLTAREN) 1 % GEL Apply 2 g topically 4 times daily as needed for Pain Yes JACQUELYN Almaguer   umeclidinium-vilanterol (ANORO ELLIPTA) 62.5-25 MCG/INH AEPB inhaler Inhale 1 puff into the lungs daily Yes JACQUELYN Almaguer   vitamin D (ERGOCALCIFEROL) 99147 units CAPS capsule take 1 capsule by mouth every week Yes Rushie Mignon, DO   ipratropium-albuterol (DUONEB) 0.5-2.5 (3) MG/3ML SOLN nebulizer solution Inhale 3 mLs into the lungs every 4 hours as needed for Shortness of Breath Yes Rushie Mignon, DO   Blood Glucose Monitoring Suppl (ONE TOUCH ULTRA MINI) w/Device KIT use as directed Yes Rushie Mignon, DO   albuterol sulfate HFA (VENTOLIN HFA) 108 (90 BASE) MCG/ACT inhaler Inhale 2 puffs into the lungs every 6 hours as needed for Wheezing Yes Rushie Mignon, DO   Elastic Bandages & Supports (CARPAL TUNNEL WRIST STABILIZER) MISC Bilateral carpal tunnel splints for carpal tunnel syndrome bilaterally (Dx: G56.01, G56.02) Yes Rushie Mignon, DO   pregabalin (LYRICA) 100 MG capsule take 1 capsule by mouth twice a day  JACQUELYN Almaguer   fluticasone (FLONASE) 50 MCG/ACT nasal spray 1 spray by Each Nare route daily for 7 days 1 Spray in each nostril  Kelly Christopher JACQUELYN - STONE       ASSESSMENT:  1. Right hip pain    2. Right sided sciatica          PLAN:    Orders Placed This Encounter   Medications    methylPREDNISolone acetate (DEPO-MEDROL) injection 80 mg     Mild stretching exercises. Consider PT if no improvement. Keep f/u.

## 2019-05-30 ENCOUNTER — TELEPHONE (OUTPATIENT)
Dept: PRIMARY CARE CLINIC | Age: 54
End: 2019-05-30

## 2019-06-05 ENCOUNTER — OFFICE VISIT (OUTPATIENT)
Dept: PRIMARY CARE CLINIC | Age: 54
End: 2019-06-05
Payer: MEDICAID

## 2019-06-05 VITALS
OXYGEN SATURATION: 90 % | SYSTOLIC BLOOD PRESSURE: 112 MMHG | HEART RATE: 87 BPM | DIASTOLIC BLOOD PRESSURE: 70 MMHG | BODY MASS INDEX: 47.72 KG/M2 | WEIGHT: 278 LBS

## 2019-06-05 DIAGNOSIS — E78.5 HYPERLIPIDEMIA, UNSPECIFIED HYPERLIPIDEMIA TYPE: ICD-10-CM

## 2019-06-05 DIAGNOSIS — I10 ESSENTIAL HYPERTENSION: ICD-10-CM

## 2019-06-05 DIAGNOSIS — G89.29 CHRONIC BACK PAIN, UNSPECIFIED BACK LOCATION, UNSPECIFIED BACK PAIN LATERALITY: ICD-10-CM

## 2019-06-05 DIAGNOSIS — E11.40 TYPE 2 DIABETES MELLITUS WITH DIABETIC NEUROPATHY, WITHOUT LONG-TERM CURRENT USE OF INSULIN (HCC): ICD-10-CM

## 2019-06-05 DIAGNOSIS — M19.90 ARTHRITIS: ICD-10-CM

## 2019-06-05 DIAGNOSIS — M54.9 CHRONIC BACK PAIN, UNSPECIFIED BACK LOCATION, UNSPECIFIED BACK PAIN LATERALITY: ICD-10-CM

## 2019-06-05 DIAGNOSIS — G57.90 NEUROPATHY OF FOOT, UNSPECIFIED LATERALITY: Primary | ICD-10-CM

## 2019-06-05 PROCEDURE — 99214 OFFICE O/P EST MOD 30 MIN: CPT | Performed by: NURSE PRACTITIONER

## 2019-06-05 RX ORDER — HYDROCODONE BITARTRATE AND ACETAMINOPHEN 7.5; 325 MG/1; MG/1
1 TABLET ORAL EVERY 8 HOURS PRN
Qty: 90 TABLET | Refills: 0 | Status: SHIPPED | OUTPATIENT
Start: 2019-06-05 | End: 2019-06-21 | Stop reason: SDUPTHER

## 2019-06-05 RX ORDER — LISINOPRIL 5 MG/1
TABLET ORAL
Qty: 30 TABLET | Refills: 5 | Status: SHIPPED | OUTPATIENT
Start: 2019-06-05 | End: 2019-11-30 | Stop reason: SDUPTHER

## 2019-06-05 ASSESSMENT — ENCOUNTER SYMPTOMS
SORE THROAT: 0
NAUSEA: 0
ABDOMINAL PAIN: 0
SHORTNESS OF BREATH: 0
EYE PAIN: 0
VOMITING: 0
COUGH: 0

## 2019-06-05 NOTE — PROGRESS NOTES
Have you seen any other physician or provider since your last visit? no    Have you had any other diagnostic tests since your last visit? no    Have you changed or stopped any medications since your last visit including any over-the-counter medicines, vitamins, or herbal medicines? no     Are you taking all your prescribed medications? Yes  If NO, why? -  N/A      REVIEW OF SYSTEMS:  Review of Systems   Constitutional: Negative for chills and fever. HENT: Negative for ear pain and sore throat. Eyes: Negative for pain and visual disturbance. Respiratory: Negative for cough and shortness of breath. Cardiovascular: Negative for chest pain, palpitations and leg swelling. Gastrointestinal: Negative for abdominal pain, nausea and vomiting. Genitourinary: Negative for dysuria and hematuria. Musculoskeletal: Positive for arthralgias, back pain and neck pain. Negative for joint swelling. Skin: Negative for rash. Neurological: Negative for dizziness and weakness. Neuropathy in feet   Psychiatric/Behavioral: Negative for sleep disturbance.

## 2019-06-13 ENCOUNTER — OFFICE VISIT (OUTPATIENT)
Dept: PRIMARY CARE CLINIC | Age: 54
End: 2019-06-13
Payer: MEDICAID

## 2019-06-13 VITALS
SYSTOLIC BLOOD PRESSURE: 124 MMHG | DIASTOLIC BLOOD PRESSURE: 80 MMHG | WEIGHT: 278 LBS | OXYGEN SATURATION: 95 % | BODY MASS INDEX: 47.72 KG/M2 | TEMPERATURE: 97.7 F | HEART RATE: 85 BPM

## 2019-06-13 DIAGNOSIS — J03.90 ACUTE TONSILLITIS, UNSPECIFIED ETIOLOGY: ICD-10-CM

## 2019-06-13 DIAGNOSIS — J02.9 SORE THROAT: Primary | ICD-10-CM

## 2019-06-13 LAB — S PYO AG THROAT QL: NORMAL

## 2019-06-13 PROCEDURE — 87880 STREP A ASSAY W/OPTIC: CPT | Performed by: NURSE PRACTITIONER

## 2019-06-13 PROCEDURE — 99213 OFFICE O/P EST LOW 20 MIN: CPT | Performed by: NURSE PRACTITIONER

## 2019-06-13 RX ORDER — ERGOCALCIFEROL 1.25 MG/1
CAPSULE ORAL
Qty: 4 CAPSULE | Refills: 5 | Status: SHIPPED | OUTPATIENT
Start: 2019-06-13 | End: 2019-11-17 | Stop reason: SDUPTHER

## 2019-06-13 RX ORDER — CEFTRIAXONE 1 G/1
1 INJECTION, POWDER, FOR SOLUTION INTRAMUSCULAR; INTRAVENOUS ONCE
Status: COMPLETED | OUTPATIENT
Start: 2019-06-13 | End: 2019-06-13

## 2019-06-13 RX ORDER — DEXAMETHASONE SODIUM PHOSPHATE 10 MG/ML
10 INJECTION INTRAMUSCULAR; INTRAVENOUS ONCE
Status: COMPLETED | OUTPATIENT
Start: 2019-06-13 | End: 2019-06-13

## 2019-06-13 RX ORDER — AMOXICILLIN AND CLAVULANATE POTASSIUM 875; 125 MG/1; MG/1
1 TABLET, FILM COATED ORAL 2 TIMES DAILY WITH MEALS
Qty: 20 TABLET | Refills: 0 | Status: SHIPPED | OUTPATIENT
Start: 2019-06-13 | End: 2019-06-23

## 2019-06-13 RX ADMIN — DEXAMETHASONE SODIUM PHOSPHATE 10 MG: 10 INJECTION INTRAMUSCULAR; INTRAVENOUS at 17:30

## 2019-06-13 RX ADMIN — CEFTRIAXONE 1 G: 1 INJECTION, POWDER, FOR SOLUTION INTRAMUSCULAR; INTRAVENOUS at 17:29

## 2019-06-13 ASSESSMENT — ENCOUNTER SYMPTOMS
VOMITING: 0
SORE THROAT: 1
NAUSEA: 0
COUGH: 1
EYE PAIN: 0
ABDOMINAL PAIN: 0
SHORTNESS OF BREATH: 0

## 2019-06-13 NOTE — PROGRESS NOTES
Have you seen any other physician or provider since your last visit? no    Have you had any other diagnostic tests since your last visit? no    Have you changed or stopped any medications since your last visit including any over-the-counter medicines, vitamins, or herbal medicines? no     Are you taking all your prescribed medications? Yes  If NO, why? -  N/A      REVIEW OF SYSTEMS:  Review of Systems   Constitutional: Negative for chills and fever. HENT: Positive for sore throat. Negative for ear pain. Eyes: Negative for pain and visual disturbance. Respiratory: Positive for cough. Negative for shortness of breath. Cardiovascular: Negative for chest pain, palpitations and leg swelling. Gastrointestinal: Negative for abdominal pain, nausea and vomiting. Genitourinary: Negative for dysuria and hematuria. Musculoskeletal: Negative for joint swelling. Skin: Negative for rash. Neurological: Negative for dizziness and weakness. Psychiatric/Behavioral: Negative for sleep disturbance.

## 2019-06-13 NOTE — PROGRESS NOTES
distension. There is no tenderness. Musculoskeletal: She exhibits no edema. Lymphadenopathy:     She has no cervical adenopathy. Neurological: She is alert and oriented to person, place, and time. Skin: Skin is warm and dry. Psychiatric: She has a normal mood and affect. Vitals reviewed. No results found for requested labs within last 30 days. Hemoglobin A1C (%)   Date Value   01/11/2019 6.3 (H)     Microscopic Examination (no units)   Date Value   04/13/2015 YES     Microalbumin, Random Urine (mg/dL)   Date Value   03/09/2018 <1.20     LDL Calculated (mg/dL)   Date Value   01/11/2019 49         Lab Results   Component Value Date    WBC 13.6 (H) 01/11/2019    NEUTROABS 9.7 (H) 01/11/2019    HGB 14.5 01/11/2019    HCT 46.8 01/11/2019    MCV 97.5 01/11/2019     01/11/2019       Lab Results   Component Value Date    TSH 3.93 04/13/2015       Prior to Visit Medications    Medication Sig Taking?  Authorizing Provider   vitamin D (ERGOCALCIFEROL) 55354 units CAPS capsule take 1 capsule by mouth every week Yes JACQUELYN Nails   amoxicillin-clavulanate (AUGMENTIN) 875-125 MG per tablet Take 1 tablet by mouth 2 times daily (with meals) for 10 days Yes JACQUELYN Nails   lisinopril (PRINIVIL;ZESTRIL) 5 MG tablet take 1 tablet by mouth once daily Yes JACQUELYN Nails   apixaban (ELIQUIS) 2.5 MG TABS tablet Samples given in office Yes JACQUELYN Nails   apixaban (ELIQUIS) 2.5 MG TABS tablet Take 1 tablet by mouth 2 times daily Yes JACQUELYN Nails   triamcinolone (KENALOG) 0.1 % cream apply to affected area twice a day Yes JACQUELYN Nails   tiZANidine (ZANAFLEX) 4 MG tablet take 1 tablet by mouth every 8 hours if needed for muscle spasm Yes JACQUELYN Nails   atorvastatin (LIPITOR) 40 MG tablet take 1 tablet by mouth at bedtime Yes JACQUELYN Nails   montelukast (SINGULAIR) 10 MG tablet Take 1 tablet by mouth nightly Yes Arther Kida, JACQUELYN Abarca 1 MG tablet take 1 tablet by mouth once daily Yes JACQUELYN Nails   cephALEXin (KEFLEX) 500 MG capsule Take 1 capsule by mouth 2 times daily Yes Samaria Garcia,    ferrous sulfate 325 (65 Fe) MG EC tablet TAKE 1 TABLET BY MOUTH ONCE DAILY WITH BREAKFAST Yes JACQUELYN Nails   metFORMIN (GLUCOPHAGE-XR) 500 MG extended release tablet 1 ac breakfast and supper Yes JACQUELYN Nails   metoprolol tartrate (LOPRESSOR) 25 MG tablet TAKE ONE HALF TABLET BY MOUTH TWO TIMES DAILY Yes JACQUELYN Nails   glimepiride (AMARYL) 4 MG tablet take 1 tablet by mouth every morning BEFORE BREAKFAST Yes JACQUELYN Nails   blood glucose test strips (ONE TOUCH TEST STRIPS) strip 1 each by In Vitro route 2 times daily Dx e 11.9 Yes JACQUELYN Nails   Lancets MISC 1 each by Does not apply route 2 times daily Dx e 11.9 Yes JACQUELYN Nails   diclofenac sodium (VOLTAREN) 1 % GEL Apply 2 g topically 4 times daily as needed for Pain Yes JACQUELYN Nails   umeclidinium-vilanterol (ANORO ELLIPTA) 62.5-25 MCG/INH AEPB inhaler Inhale 1 puff into the lungs daily Yes JACQUELYN Nails   ipratropium-albuterol (DUONEB) 0.5-2.5 (3) MG/3ML SOLN nebulizer solution Inhale 3 mLs into the lungs every 4 hours as needed for Shortness of Breath Yes Leta Perry DO   Blood Glucose Monitoring Suppl (ONE TOUCH ULTRA MINI) w/Device KIT use as directed Yes Leta Perry DO   albuterol sulfate HFA (VENTOLIN HFA) 108 (90 BASE) MCG/ACT inhaler Inhale 2 puffs into the lungs every 6 hours as needed for Wheezing Yes Hannah Solo DO   Elastic Bandages & Supports (CARPAL TUNNEL WRIST STABILIZER) MISC Bilateral carpal tunnel splints for carpal tunnel syndrome bilaterally (Dx: G56.01, G56.02) Yes Hannah Solo, DO   HYDROcodone-acetaminophen (NORCO) 7.5-325 MG per tablet Take 1 tablet by mouth every 8 hours as needed for Pain for up to 30 days.   Arther Kida, APRN   pregabalin (LYRICA) 100 MG capsule take 1 capsule by mouth

## 2019-06-17 ASSESSMENT — ENCOUNTER SYMPTOMS: BACK PAIN: 1

## 2019-06-17 NOTE — PROGRESS NOTES
SUBJECTIVE:    Patient ID: Jazmín Head is a 62 y.o. female. Medicalhistory Review  Past Medical, Family, and Social History reviewed and does contribute to the patient presenting condition    Health Maintenance Due   Topic Date Due    Hepatitis C screen  1965    HIV screen  04/17/1978    Hepatitis B Vaccine (1 of 3 - Risk 3-dose series) 04/17/1982    DTaP/Tdap/Td vaccine (1 - Tdap) 04/17/1982    Shingles Vaccine (1 of 2) 04/17/2013    Diabetic retinal exam  05/10/2017    Cervical cancer screen  08/21/2017    Diabetic foot exam  03/09/2018    Low dose CT lung screening  04/17/2018    Breast cancer screen  04/25/2018    Diabetic microalbuminuria test  03/09/2019       HPI:   Chief Complaint   Patient presents with    Diabetes     Patient here today for a follow up with DM. She states her throat is scratchy today. She continues to watch her diet for sugar and is trying to lose weight. She is doing well with Norco for her chronic neck and back pain. She takes Lyrica for neuropathy. Patient's medications, allergies, pastmedical, surgical, social and family histories were reviewed and updated as appropriate. Review of Systems Reviewed and acurate. See MA note. OBJECTIVE:    /70 (Site: Right Upper Arm, Position: Sitting, Cuff Size: Large Adult)   Pulse 87   Wt 278 lb (126.1 kg)   SpO2 90%   BMI 47.72 kg/m²      Physical Exam   Constitutional: She is oriented to person, place, and time. She appears well-developed and well-nourished. No distress. HENT:   Head: Normocephalic. Right Ear: Tympanic membrane normal.   Left Ear: Tympanic membrane normal.   Mouth/Throat: No oropharyngeal exudate. Eyes: Lids are normal.   Neck: Neck supple. Cardiovascular: Normal rate, regular rhythm and normal heart sounds. Pulmonary/Chest: Effort normal and breath sounds normal.   Abdominal: Soft. Bowel sounds are normal. She exhibits no distension. There is no tenderness. Musculoskeletal: She exhibits no edema. Lymphadenopathy:     She has no cervical adenopathy. Neurological: She is alert and oriented to person, place, and time. Skin: Skin is warm and dry. Psychiatric: She has a normal mood and affect. Vitals reviewed. No results found for requested labs within last 30 days. Hemoglobin A1C (%)   Date Value   01/11/2019 6.3 (H)     Microscopic Examination (no units)   Date Value   04/13/2015 YES     Microalbumin, Random Urine (mg/dL)   Date Value   03/09/2018 <1.20     LDL Calculated (mg/dL)   Date Value   01/11/2019 49         Lab Results   Component Value Date    WBC 13.6 (H) 01/11/2019    NEUTROABS 9.7 (H) 01/11/2019    HGB 14.5 01/11/2019    HCT 46.8 01/11/2019    MCV 97.5 01/11/2019     01/11/2019       Lab Results   Component Value Date    TSH 3.93 04/13/2015       Prior to Visit Medications    Medication Sig Taking? Authorizing Provider   lisinopril (PRINIVIL;ZESTRIL) 5 MG tablet take 1 tablet by mouth once daily Yes JACQUELYN Layton   HYDROcodone-acetaminophen (NORCO) 7.5-325 MG per tablet Take 1 tablet by mouth every 8 hours as needed for Pain for up to 30 days.  Yes JACQUELYN Layton   apixaban Genaro Xiomy) 2.5 MG TABS tablet Samples given in office Yes JACQUELYN Layton   apixaban (ELIQUIS) 2.5 MG TABS tablet Take 1 tablet by mouth 2 times daily Yes JACQUELYN Layton   triamcinolone (KENALOG) 0.1 % cream apply to affected area twice a day Yes JACQUELYN Layton   tiZANidine (ZANAFLEX) 4 MG tablet take 1 tablet by mouth every 8 hours if needed for muscle spasm Yes JACQUELYN Layton   atorvastatin (LIPITOR) 40 MG tablet take 1 tablet by mouth at bedtime Yes JACQUELYN Layton   montelukast (SINGULAIR) 10 MG tablet Take 1 tablet by mouth nightly Yes JACQUELYN Layton   bumetanide (BUMEX) 1 MG tablet take 1 tablet by mouth once daily Yes JACQUELYN Layton   cephALEXin (KEFLEX) 500 MG capsule Take 1 capsule by mouth 2 times daily Yes Glenny Vieira,    ferrous sulfate 325 (65 Fe) MG EC tablet TAKE 1 TABLET BY MOUTH ONCE DAILY WITH BREAKFAST Yes JACQUELYN Sahni   metFORMIN (GLUCOPHAGE-XR) 500 MG extended release tablet 1 ac breakfast and supper Yes JACQUELYN Sahni   metoprolol tartrate (LOPRESSOR) 25 MG tablet TAKE ONE HALF TABLET BY MOUTH TWO TIMES DAILY Yes JACQUELYN Sahni   glimepiride (AMARYL) 4 MG tablet take 1 tablet by mouth every morning BEFORE BREAKFAST Yes JACQUELYN Sahni   blood glucose test strips (ONE TOUCH TEST STRIPS) strip 1 each by In Vitro route 2 times daily Dx e 11.9 Yes JACQUELYN Sahni   Lancets MISC 1 each by Does not apply route 2 times daily Dx e 11.9 Yes JACQUELYN Sahni   diclofenac sodium (VOLTAREN) 1 % GEL Apply 2 g topically 4 times daily as needed for Pain Yes JACQUELYN Sahni   umeclidinium-vilanterol (ANORO ELLIPTA) 62.5-25 MCG/INH AEPB inhaler Inhale 1 puff into the lungs daily Yes JACQUELYN Sahni   ipratropium-albuterol (DUONEB) 0.5-2.5 (3) MG/3ML SOLN nebulizer solution Inhale 3 mLs into the lungs every 4 hours as needed for Shortness of Breath Yes Cassie Tovar DO   Blood Glucose Monitoring Suppl (ONE TOUCH ULTRA MINI) w/Device KIT use as directed Yes Cassie Tovar DO   albuterol sulfate HFA (VENTOLIN HFA) 108 (90 BASE) MCG/ACT inhaler Inhale 2 puffs into the lungs every 6 hours as needed for Wheezing Yes Cassie Tovar DO   Elastic Bandages & Supports (CARPAL TUNNEL WRIST STABILIZER) MISC Bilateral carpal tunnel splints for carpal tunnel syndrome bilaterally (Dx: G56.01, G56.02) Yes Cassie Tovar DO   vitamin D (ERGOCALCIFEROL) 56514 units CAPS capsule take 1 capsule by mouth every week  JACQUELYN Sahni   amoxicillin-clavulanate (AUGMENTIN) 875-125 MG per tablet Take 1 tablet by mouth 2 times daily (with meals) for 10 days  JACQUELYN Sahni   pregabalin (LYRICA) 100 MG capsule take 1 capsule by mouth twice a day  JACQUELYN Sahni

## 2019-06-21 DIAGNOSIS — M19.90 ARTHRITIS: ICD-10-CM

## 2019-06-21 DIAGNOSIS — M54.9 CHRONIC BACK PAIN, UNSPECIFIED BACK LOCATION, UNSPECIFIED BACK PAIN LATERALITY: ICD-10-CM

## 2019-06-21 DIAGNOSIS — G89.29 CHRONIC BACK PAIN, UNSPECIFIED BACK LOCATION, UNSPECIFIED BACK PAIN LATERALITY: ICD-10-CM

## 2019-06-21 RX ORDER — HYDROCODONE BITARTRATE AND ACETAMINOPHEN 7.5; 325 MG/1; MG/1
1 TABLET ORAL EVERY 8 HOURS PRN
Qty: 90 TABLET | Refills: 0 | Status: SHIPPED | OUTPATIENT
Start: 2019-06-21 | End: 2019-08-01 | Stop reason: SDUPTHER

## 2019-07-02 ENCOUNTER — TELEPHONE (OUTPATIENT)
Dept: PRIMARY CARE CLINIC | Age: 54
End: 2019-07-02

## 2019-07-02 DIAGNOSIS — M19.90 ARTHRITIS: ICD-10-CM

## 2019-07-02 DIAGNOSIS — M54.9 CHRONIC BACK PAIN, UNSPECIFIED BACK LOCATION, UNSPECIFIED BACK PAIN LATERALITY: ICD-10-CM

## 2019-07-02 DIAGNOSIS — G89.29 CHRONIC BACK PAIN, UNSPECIFIED BACK LOCATION, UNSPECIFIED BACK PAIN LATERALITY: ICD-10-CM

## 2019-07-02 RX ORDER — METFORMIN HYDROCHLORIDE 500 MG/1
TABLET, EXTENDED RELEASE ORAL
Qty: 60 TABLET | Refills: 5 | Status: SHIPPED | OUTPATIENT
Start: 2019-07-02 | End: 2019-09-05 | Stop reason: SDUPTHER

## 2019-07-02 RX ORDER — HYDROCODONE BITARTRATE AND ACETAMINOPHEN 7.5; 325 MG/1; MG/1
1 TABLET ORAL EVERY 8 HOURS PRN
Qty: 90 TABLET | Refills: 0 | OUTPATIENT
Start: 2019-07-02 | End: 2019-08-01

## 2019-07-30 ENCOUNTER — TELEPHONE (OUTPATIENT)
Dept: PRIMARY CARE CLINIC | Age: 54
End: 2019-07-30

## 2019-07-30 NOTE — TELEPHONE ENCOUNTER
Per the orders of Teresa Valentin, aprn, 4 box/boxes of Eliquis 2.5 mg were given to patient today. Qty. 56, Lot # [id number], Exp. Date 09/20. Patient given instructions with samples.

## 2019-08-01 DIAGNOSIS — M54.9 CHRONIC BACK PAIN, UNSPECIFIED BACK LOCATION, UNSPECIFIED BACK PAIN LATERALITY: ICD-10-CM

## 2019-08-01 DIAGNOSIS — G89.29 CHRONIC BACK PAIN, UNSPECIFIED BACK LOCATION, UNSPECIFIED BACK PAIN LATERALITY: ICD-10-CM

## 2019-08-01 DIAGNOSIS — M19.90 ARTHRITIS: ICD-10-CM

## 2019-08-01 DIAGNOSIS — E11.40 TYPE 2 DIABETES MELLITUS WITH DIABETIC NEUROPATHY, WITHOUT LONG-TERM CURRENT USE OF INSULIN (HCC): ICD-10-CM

## 2019-08-01 RX ORDER — HYDROCODONE BITARTRATE AND ACETAMINOPHEN 7.5; 325 MG/1; MG/1
1 TABLET ORAL EVERY 8 HOURS PRN
Qty: 90 TABLET | Refills: 0 | Status: SHIPPED | OUTPATIENT
Start: 2019-08-01 | End: 2019-08-30 | Stop reason: SDUPTHER

## 2019-08-01 RX ORDER — GLIMEPIRIDE 4 MG/1
TABLET ORAL
Qty: 30 TABLET | Refills: 5 | Status: SHIPPED | OUTPATIENT
Start: 2019-08-01 | End: 2020-01-14

## 2019-08-09 ENCOUNTER — TELEPHONE (OUTPATIENT)
Dept: PRIMARY CARE CLINIC | Age: 54
End: 2019-08-09

## 2019-08-13 ENCOUNTER — HOSPITAL ENCOUNTER (OUTPATIENT)
Facility: HOSPITAL | Age: 54
Discharge: HOME | End: 2019-08-13
Payer: MEDICAID

## 2019-08-13 DIAGNOSIS — E78.5 HYPERLIPIDEMIA, UNSPECIFIED HYPERLIPIDEMIA TYPE: ICD-10-CM

## 2019-08-13 DIAGNOSIS — E11.40 TYPE 2 DIABETES MELLITUS WITH DIABETIC NEUROPATHY, WITHOUT LONG-TERM CURRENT USE OF INSULIN (HCC): ICD-10-CM

## 2019-08-13 LAB
A/G RATIO: 1.6 (ref 0.8–2)
ALBUMIN SERPL-MCNC: 4.2 G/DL (ref 3.4–4.8)
ALP BLD-CCNC: 93 U/L (ref 25–100)
ALT SERPL-CCNC: 15 U/L (ref 4–36)
ANION GAP SERPL CALCULATED.3IONS-SCNC: 12 MMOL/L (ref 3–16)
AST SERPL-CCNC: 12 U/L (ref 8–33)
BASOPHILS ABSOLUTE: 0 K/UL (ref 0–0.1)
BASOPHILS RELATIVE PERCENT: 0.6 %
BILIRUB SERPL-MCNC: 0.7 MG/DL (ref 0.3–1.2)
BUN BLDV-MCNC: 19 MG/DL (ref 6–20)
CALCIUM SERPL-MCNC: 9.6 MG/DL (ref 8.5–10.5)
CHLORIDE BLD-SCNC: 101 MMOL/L (ref 98–107)
CHOLESTEROL, TOTAL: 113 MG/DL (ref 0–200)
CO2: 27 MMOL/L (ref 20–30)
CREAT SERPL-MCNC: 1 MG/DL (ref 0.4–1.2)
EOSINOPHILS ABSOLUTE: 0.3 K/UL (ref 0–0.4)
EOSINOPHILS RELATIVE PERCENT: 3.7 %
GFR AFRICAN AMERICAN: >59
GFR NON-AFRICAN AMERICAN: 57
GLOBULIN: 2.7 G/DL
GLUCOSE BLD-MCNC: 93 MG/DL (ref 74–106)
HBA1C MFR BLD: 6.4 %
HCT VFR BLD CALC: 41.6 % (ref 37–47)
HDLC SERPL-MCNC: 42 MG/DL (ref 40–60)
HEMOGLOBIN: 13.5 G/DL (ref 11.5–16.5)
IMMATURE GRANULOCYTES #: 0 K/UL
IMMATURE GRANULOCYTES %: 0.3 % (ref 0–5)
LDL CHOLESTEROL CALCULATED: 43 MG/DL
LYMPHOCYTES ABSOLUTE: 2.1 K/UL (ref 1.5–4)
LYMPHOCYTES RELATIVE PERCENT: 30.2 %
MCH RBC QN AUTO: 30.9 PG (ref 27–32)
MCHC RBC AUTO-ENTMCNC: 32.5 G/DL (ref 31–35)
MCV RBC AUTO: 95.2 FL (ref 80–100)
MONOCYTES ABSOLUTE: 0.8 K/UL (ref 0.2–0.8)
MONOCYTES RELATIVE PERCENT: 11.7 %
NEUTROPHILS ABSOLUTE: 3.8 K/UL (ref 2–7.5)
NEUTROPHILS RELATIVE PERCENT: 53.5 %
PDW BLD-RTO: 13.6 % (ref 11–16)
PLATELET # BLD: 249 K/UL (ref 150–400)
PMV BLD AUTO: 11.5 FL (ref 6–10)
POTASSIUM SERPL-SCNC: 4.4 MMOL/L (ref 3.4–5.1)
RBC # BLD: 4.37 M/UL (ref 3.8–5.8)
SODIUM BLD-SCNC: 140 MMOL/L (ref 136–145)
TOTAL PROTEIN: 6.9 G/DL (ref 6.4–8.3)
TRIGL SERPL-MCNC: 142 MG/DL (ref 0–249)
VLDLC SERPL CALC-MCNC: 28 MG/DL
WBC # BLD: 7 K/UL (ref 4–11)

## 2019-08-13 PROCEDURE — 80053 COMPREHEN METABOLIC PANEL: CPT

## 2019-08-13 PROCEDURE — 85025 COMPLETE CBC W/AUTO DIFF WBC: CPT

## 2019-08-13 PROCEDURE — 80061 LIPID PANEL: CPT

## 2019-08-13 PROCEDURE — 83036 HEMOGLOBIN GLYCOSYLATED A1C: CPT

## 2019-08-21 ENCOUNTER — OFFICE VISIT (OUTPATIENT)
Dept: PULMONOLOGY | Facility: CLINIC | Age: 54
End: 2019-08-21

## 2019-08-21 VITALS
OXYGEN SATURATION: 93 % | HEART RATE: 60 BPM | DIASTOLIC BLOOD PRESSURE: 72 MMHG | RESPIRATION RATE: 16 BRPM | BODY MASS INDEX: 47.46 KG/M2 | SYSTOLIC BLOOD PRESSURE: 118 MMHG | WEIGHT: 278 LBS | HEIGHT: 64 IN

## 2019-08-21 DIAGNOSIS — G47.33 OBSTRUCTIVE SLEEP APNEA: ICD-10-CM

## 2019-08-21 DIAGNOSIS — J30.89 OTHER ALLERGIC RHINITIS: ICD-10-CM

## 2019-08-21 DIAGNOSIS — I26.99 OTHER PULMONARY EMBOLISM WITHOUT ACUTE COR PULMONALE, UNSPECIFIED CHRONICITY (HCC): ICD-10-CM

## 2019-08-21 DIAGNOSIS — E66.01 MORBID OBESITY, UNSPECIFIED OBESITY TYPE (HCC): ICD-10-CM

## 2019-08-21 DIAGNOSIS — R09.02 EXERCISE HYPOXEMIA: ICD-10-CM

## 2019-08-21 DIAGNOSIS — J44.9 CHRONIC OBSTRUCTIVE PULMONARY DISEASE, UNSPECIFIED COPD TYPE (HCC): ICD-10-CM

## 2019-08-21 DIAGNOSIS — G47.34 NOCTURNAL HYPOXIA: ICD-10-CM

## 2019-08-21 DIAGNOSIS — R06.02 SHORTNESS OF BREATH: Primary | ICD-10-CM

## 2019-08-21 PROCEDURE — 99214 OFFICE O/P EST MOD 30 MIN: CPT | Performed by: INTERNAL MEDICINE

## 2019-08-21 RX ORDER — MONTELUKAST SODIUM 10 MG/1
10 TABLET ORAL NIGHTLY
COMMUNITY
End: 2019-11-19 | Stop reason: SDUPTHER

## 2019-08-21 NOTE — PROGRESS NOTES
"Chief Complaint   Patient presents with   • Follow-up   • Shortness of Breath       Subjective   Chante Wetzel is a 54 y.o. female.     History of Present Illness   Patient comes today for follow up of shortness of breath, ARIANNA, allergic rhinitis and COPD.     Patient says that her symptoms have been stable since the last clinic visit. she reports no recent exacerbations.     Patient is using medications, as prescribed. Exercise tolerance has also remained stable.     Patient doesn't report any issues with the device. The patient describes no significant issues with her mask either.  Patient says that the compliance with the use of the equipment is good.     Patient says that her symptoms of fatigue & daytime sleepiness have been helped greatly with the use of PAP, as prescribed.     The patient says that she is using oxygen as prescribed and feels that it appears to be helping some of her symptoms.    She continues to be on Eliquis for PEs.       The following portions of the patient's history were reviewed and updated as appropriate: allergies, current medications, past family history, past medical history, past social history and past surgical history.    Review of Systems   HENT: Positive for sneezing. Negative for sinus pressure and sore throat.    Respiratory: Negative for cough, shortness of breath and wheezing.        Objective   Visit Vitals  /72   Pulse 60   Resp 16   Ht 162.6 cm (64.02\")   Wt 126 kg (278 lb)   SpO2 93%   BMI 47.69 kg/m²       Physical Exam   Constitutional: She is oriented to person, place, and time. She appears well-developed and well-nourished.   HENT:   Head: Atraumatic.   Crowded oropharynx.  Dentures.    Neck: Neck supple.   Increased adipose tissue   Cardiovascular: Normal rate and regular rhythm.   Pulmonary/Chest: Effort normal. No respiratory distress.   Somewhat hyperresonant to percussion  Somewhat decreased A/E without wheezing noted.   Musculoskeletal: She exhibits no " edema (Mild.).   Gait was intact.   Neurological: She is alert and oriented to person, place, and time.   Skin: Skin is warm.   Psychiatric: She has a normal mood and affect.   Vitals reviewed.      Assessment/Plan   Chante was seen today for follow-up and shortness of breath.    Diagnoses and all orders for this visit:    Shortness of breath  -     Pulmonary Function Test; Future    Obstructive sleep apnea    Chronic obstructive pulmonary disease, unspecified COPD type (CMS/HCC)  -     Pulmonary Function Test; Future    Morbid obesity, unspecified obesity type (CMS/HCC)    Exercise hypoxemia    Nocturnal hypoxia    Other allergic rhinitis    Other pulmonary embolism without acute cor pulmonale, unspecified chronicity (CMS/HCC)    Other orders  -     apixaban (ELIQUIS) 2.5 MG tablet tablet; Take 1 tablet by mouth Every 12 (Twelve) Hours.         Return in about 3 months (around 11/21/2019) for Recheck, PFT on day of F/Up, For [place]. Also 7 months with Dr. Mccormack.    DISCUSSION (if any):  Laboratory data was reviewed with her.   Lab Results   Component Value Date    AFPTM 166 07/14/2016     Lab Results   Component Value Date    [id number] MM 07/14/2016     Last PFTs (Nov 2016) showed FEV1 of 55%.     PFTs will be ordered to be done upon follow up.    We have reviewed her pulmonary medications in great detail.    Any needed adjustments to her pulmonary medications, either for clinical or insurance coverage reasons, have been made and are reflected in the orders.    Compliance with medications stressed.     Side effects of prescribed medications discussed with the patient    I reviewed the results of last sleep study in detail. I informed her that the apnea hypopnea index was 89 / hr. This was an in lab study. This was done in August 2016.    Continue treatment with CPAP at a pressure of 13, with a nasal pillows.    Patient seems to be compliant with PAP device, based on the available data and her account of improved  symptoms.     Weight loss advised.    The patient was once again reminded to continue using the PAP device regularly, every night for atleast 4 hours.    The patient was advised to continue oxygen with exercise & at night (bled in with CPAP), since she has noticed improvement in some symptoms since using it as prescribed.    She will need to continue Eliquis 2.5 mg PO BID for the rest of her life.     Dictated utilizing Dragon dictation.    This document was electronically signed by Yuridia Mccormack MD on 08/21/19 at 4:12 PM

## 2019-08-29 DIAGNOSIS — E11.40 TYPE 2 DIABETES MELLITUS WITH DIABETIC NEUROPATHY, WITHOUT LONG-TERM CURRENT USE OF INSULIN (HCC): ICD-10-CM

## 2019-08-29 RX ORDER — ATORVASTATIN CALCIUM 40 MG/1
TABLET, FILM COATED ORAL
Qty: 90 TABLET | Refills: 1 | Status: SHIPPED | OUTPATIENT
Start: 2019-08-29 | End: 2019-09-05 | Stop reason: SDUPTHER

## 2019-08-29 RX ORDER — PNV NO.95/FERROUS FUM/FOLIC AC 28MG-0.8MG
TABLET ORAL
Qty: 30 TABLET | Refills: 4 | Status: SHIPPED | OUTPATIENT
Start: 2019-08-29 | End: 2020-02-06

## 2019-08-30 DIAGNOSIS — M19.90 ARTHRITIS: ICD-10-CM

## 2019-08-30 DIAGNOSIS — M54.9 CHRONIC BACK PAIN, UNSPECIFIED BACK LOCATION, UNSPECIFIED BACK PAIN LATERALITY: ICD-10-CM

## 2019-08-30 DIAGNOSIS — G89.29 CHRONIC BACK PAIN, UNSPECIFIED BACK LOCATION, UNSPECIFIED BACK PAIN LATERALITY: ICD-10-CM

## 2019-08-30 RX ORDER — HYDROCODONE BITARTRATE AND ACETAMINOPHEN 7.5; 325 MG/1; MG/1
1 TABLET ORAL EVERY 8 HOURS PRN
Qty: 90 TABLET | Refills: 0 | Status: SHIPPED | OUTPATIENT
Start: 2019-08-30 | End: 2019-09-26 | Stop reason: SDUPTHER

## 2019-09-03 ENCOUNTER — TELEPHONE (OUTPATIENT)
Dept: PRIMARY CARE CLINIC | Age: 54
End: 2019-09-03

## 2019-09-05 ENCOUNTER — OFFICE VISIT (OUTPATIENT)
Dept: PRIMARY CARE CLINIC | Age: 54
End: 2019-09-05
Payer: MEDICAID

## 2019-09-05 VITALS
WEIGHT: 276 LBS | BODY MASS INDEX: 47.38 KG/M2 | OXYGEN SATURATION: 95 % | SYSTOLIC BLOOD PRESSURE: 112 MMHG | DIASTOLIC BLOOD PRESSURE: 78 MMHG | HEART RATE: 67 BPM

## 2019-09-05 DIAGNOSIS — M54.2 CHRONIC NECK AND BACK PAIN: ICD-10-CM

## 2019-09-05 DIAGNOSIS — G89.29 CHRONIC NECK AND BACK PAIN: ICD-10-CM

## 2019-09-05 DIAGNOSIS — M54.9 CHRONIC NECK AND BACK PAIN: ICD-10-CM

## 2019-09-05 DIAGNOSIS — E78.5 HYPERLIPIDEMIA, UNSPECIFIED HYPERLIPIDEMIA TYPE: ICD-10-CM

## 2019-09-05 DIAGNOSIS — G57.90 NEUROPATHY OF FOOT, UNSPECIFIED LATERALITY: ICD-10-CM

## 2019-09-05 DIAGNOSIS — E11.40 TYPE 2 DIABETES MELLITUS WITH DIABETIC NEUROPATHY, WITHOUT LONG-TERM CURRENT USE OF INSULIN (HCC): ICD-10-CM

## 2019-09-05 DIAGNOSIS — I10 ESSENTIAL HYPERTENSION: Primary | ICD-10-CM

## 2019-09-05 PROCEDURE — 99213 OFFICE O/P EST LOW 20 MIN: CPT | Performed by: NURSE PRACTITIONER

## 2019-09-05 RX ORDER — METFORMIN HYDROCHLORIDE 500 MG/1
TABLET, EXTENDED RELEASE ORAL
Qty: 60 TABLET | Refills: 5 | Status: SHIPPED | OUTPATIENT
Start: 2019-09-05 | End: 2020-03-05 | Stop reason: SDUPTHER

## 2019-09-05 RX ORDER — ATORVASTATIN CALCIUM 40 MG/1
TABLET, FILM COATED ORAL
Qty: 30 TABLET | Refills: 5 | Status: SHIPPED | OUTPATIENT
Start: 2019-09-05 | End: 2020-01-27

## 2019-09-05 RX ORDER — PREGABALIN 100 MG/1
CAPSULE ORAL
Qty: 60 CAPSULE | Refills: 5 | Status: SHIPPED | OUTPATIENT
Start: 2019-09-05 | End: 2019-09-26 | Stop reason: SDUPTHER

## 2019-09-05 RX ORDER — FLUTICASONE PROPIONATE 50 MCG
2 SPRAY, SUSPENSION (ML) NASAL DAILY
Qty: 1 BOTTLE | Refills: 5 | Status: SHIPPED | OUTPATIENT
Start: 2019-09-05 | End: 2021-02-25 | Stop reason: SDUPTHER

## 2019-09-05 ASSESSMENT — ENCOUNTER SYMPTOMS
COUGH: 0
NAUSEA: 0
VOMITING: 0
EYE PAIN: 0
SORE THROAT: 0
ABDOMINAL PAIN: 0
SHORTNESS OF BREATH: 0

## 2019-09-06 ENCOUNTER — TELEPHONE (OUTPATIENT)
Dept: PULMONOLOGY | Facility: CLINIC | Age: 54
End: 2019-09-06

## 2019-09-06 NOTE — TELEPHONE ENCOUNTER
Patient had brought a letter in stating that her insurance was not going to pay for her Oxygen because the billing codes were not being billed correctly to cover it. I called and spoke to [employer] and they told me that if they can adjust the way they are billing the codes there should not be a problem. I called [facility] and spoke to Neal she stated that they have all the up to date current O2 documentation for this patient, she was not sure what the problem is so she stated that the patient could call the billing department and see what was the reason it is not going through [employer] correctly she stated she would call the patient and let her know. I told her if the patient had anymore questions to call us.

## 2019-09-26 DIAGNOSIS — G89.29 CHRONIC BACK PAIN, UNSPECIFIED BACK LOCATION, UNSPECIFIED BACK PAIN LATERALITY: ICD-10-CM

## 2019-09-26 DIAGNOSIS — M54.9 CHRONIC BACK PAIN, UNSPECIFIED BACK LOCATION, UNSPECIFIED BACK PAIN LATERALITY: ICD-10-CM

## 2019-09-26 DIAGNOSIS — M19.90 ARTHRITIS: ICD-10-CM

## 2019-09-26 DIAGNOSIS — G89.29 CHRONIC NECK AND BACK PAIN: ICD-10-CM

## 2019-09-26 DIAGNOSIS — G57.90 NEUROPATHY OF FOOT, UNSPECIFIED LATERALITY: ICD-10-CM

## 2019-09-26 DIAGNOSIS — M54.9 CHRONIC NECK AND BACK PAIN: ICD-10-CM

## 2019-09-26 DIAGNOSIS — M54.2 CHRONIC NECK AND BACK PAIN: ICD-10-CM

## 2019-09-26 RX ORDER — PREGABALIN 100 MG/1
CAPSULE ORAL
Qty: 60 CAPSULE | Refills: 5 | Status: SHIPPED | OUTPATIENT
Start: 2019-09-26 | End: 2020-02-05

## 2019-09-26 RX ORDER — HYDROCODONE BITARTRATE AND ACETAMINOPHEN 7.5; 325 MG/1; MG/1
1 TABLET ORAL EVERY 8 HOURS PRN
Qty: 90 TABLET | Refills: 0 | Status: SHIPPED | OUTPATIENT
Start: 2019-09-26 | End: 2019-10-23 | Stop reason: SDUPTHER

## 2019-09-26 NOTE — TELEPHONE ENCOUNTER
Pt called stating that the pharmacy gave her \"generic Lyrica\" and that it does not help her like the name brand. Asking you to resend with directions not to substitute.  Also needs refill on Tere Jus 7/2/19

## 2019-10-07 DIAGNOSIS — E11.65 TYPE 2 DIABETES MELLITUS WITH HYPERGLYCEMIA, WITHOUT LONG-TERM CURRENT USE OF INSULIN (HCC): ICD-10-CM

## 2019-10-08 RX ORDER — LANCETS 33 GAUGE
EACH MISCELLANEOUS
Qty: 100 EACH | Refills: 0 | Status: SHIPPED | OUTPATIENT
Start: 2019-10-08 | End: 2020-03-16

## 2019-10-22 RX ORDER — MONTELUKAST SODIUM 10 MG/1
TABLET ORAL
Qty: 90 TABLET | Refills: 1 | Status: SHIPPED | OUTPATIENT
Start: 2019-10-22 | End: 2020-11-12 | Stop reason: SDUPTHER

## 2019-10-23 ENCOUNTER — OFFICE VISIT (OUTPATIENT)
Dept: CARDIOLOGY | Facility: CLINIC | Age: 54
End: 2019-10-23

## 2019-10-23 VITALS
RESPIRATION RATE: 20 BRPM | WEIGHT: 284 LBS | HEIGHT: 64 IN | DIASTOLIC BLOOD PRESSURE: 62 MMHG | SYSTOLIC BLOOD PRESSURE: 110 MMHG | OXYGEN SATURATION: 93 % | BODY MASS INDEX: 48.49 KG/M2 | HEART RATE: 78 BPM

## 2019-10-23 DIAGNOSIS — I27.81 COR PULMONALE, CHRONIC (HCC): ICD-10-CM

## 2019-10-23 DIAGNOSIS — E66.01 CLASS 3 SEVERE OBESITY DUE TO EXCESS CALORIES WITH SERIOUS COMORBIDITY AND BODY MASS INDEX (BMI) OF 45.0 TO 49.9 IN ADULT (HCC): ICD-10-CM

## 2019-10-23 DIAGNOSIS — J43.2 CENTRILOBULAR EMPHYSEMA (HCC): ICD-10-CM

## 2019-10-23 DIAGNOSIS — R06.02 SHORTNESS OF BREATH: ICD-10-CM

## 2019-10-23 DIAGNOSIS — I27.20 PULMONARY HYPERTENSION (HCC): ICD-10-CM

## 2019-10-23 DIAGNOSIS — M54.9 CHRONIC BACK PAIN, UNSPECIFIED BACK LOCATION, UNSPECIFIED BACK PAIN LATERALITY: ICD-10-CM

## 2019-10-23 DIAGNOSIS — G89.29 CHRONIC BACK PAIN, UNSPECIFIED BACK LOCATION, UNSPECIFIED BACK PAIN LATERALITY: ICD-10-CM

## 2019-10-23 DIAGNOSIS — E11.65 TYPE 2 DIABETES MELLITUS WITH HYPERGLYCEMIA, WITHOUT LONG-TERM CURRENT USE OF INSULIN (HCC): ICD-10-CM

## 2019-10-23 DIAGNOSIS — I10 BENIGN HYPERTENSION: Primary | ICD-10-CM

## 2019-10-23 DIAGNOSIS — M19.90 ARTHRITIS: ICD-10-CM

## 2019-10-23 PROCEDURE — 99214 OFFICE O/P EST MOD 30 MIN: CPT | Performed by: INTERNAL MEDICINE

## 2019-10-23 RX ORDER — HYDROCODONE BITARTRATE AND ACETAMINOPHEN 7.5; 325 MG/1; MG/1
1 TABLET ORAL EVERY 8 HOURS PRN
Qty: 90 TABLET | Refills: 0 | Status: SHIPPED | OUTPATIENT
Start: 2019-10-23 | End: 2019-11-20 | Stop reason: SDUPTHER

## 2019-10-23 NOTE — PROGRESS NOTES
Subjective:     Encounter Date:10/23/2019      Patient ID: Chante Wetzel is a 54 y.o. female.    Chief Complaint: Hypertension  HPI  This is a 54-year-old female patient who presents to cardiology clinic for routine follow-up.  The patient reports that over the last year she has lost 40 pounds of weight by cutting out bread and potatoes.  She indicates that she is trying to watch her salt intake and has stopped eating potato chips.  The patient indicates that she feels much better.  She is now able to do her own housework without exertional limitation.  The patient has no chest discomfort at rest or with activity.  There is no exertional chest arm neck jaw shoulder or back discomfort.  There is no orthopnea PND or lower extremity edema.  There is no dizziness palpitations or syncope.  The following portions of the patient's history were reviewed and updated as appropriate: allergies, current medications, past family history, past medical history, past social history, past surgical history and problem  Review of Systems   Constitution: Negative for chills, diaphoresis, fever, weakness, malaise/fatigue, weight gain and weight loss.   HENT: Negative for ear discharge, hearing loss, hoarse voice and nosebleeds.    Eyes: Negative for discharge, double vision, pain and photophobia.   Cardiovascular: Negative for chest pain, claudication, cyanosis, dyspnea on exertion, irregular heartbeat, leg swelling, near-syncope, orthopnea, palpitations, paroxysmal nocturnal dyspnea and syncope.   Respiratory: Negative for cough, hemoptysis, shortness of breath, sputum production and wheezing.    Endocrine: Negative for cold intolerance, heat intolerance, polydipsia, polyphagia and polyuria.   Hematologic/Lymphatic: Negative for adenopathy and bleeding problem. Does not bruise/bleed easily.   Skin: Negative for color change, flushing, itching and rash.   Musculoskeletal: Negative for muscle cramps, muscle weakness, myalgias and  stiffness.   Gastrointestinal: Negative for abdominal pain, diarrhea, hematemesis, hematochezia, nausea and vomiting.   Genitourinary: Negative for dysuria, frequency and nocturia.   Neurological: Negative for dizziness, focal weakness, loss of balance, numbness, paresthesias and seizures.   Psychiatric/Behavioral: Negative for altered mental status, hallucinations and suicidal ideas.   Allergic/Immunologic: Negative for HIV exposure, hives and persistent infections.           Current Outpatient Medications:   •  apixaban (ELIQUIS) 2.5 MG tablet tablet, Take 1 tablet by mouth Every 12 (Twelve) Hours., Disp: 60 tablet, Rfl: 11  •  atorvastatin (LIPITOR) 40 MG tablet, take 1 tablet by mouth at bedtime, Disp: , Rfl:   •  Blood Glucose Monitoring Suppl (ONE TOUCH ULTRA MINI) w/Device kit, use as directed, Disp: , Rfl: 0  •  Blood Glucose Monitoring Suppl (ONE TOUCH ULTRA MINI) w/Device kit, use as directed, Disp: , Rfl:   •  bumetanide (BUMEX) 1 MG tablet, Take 1 mg by mouth Daily., Disp: , Rfl:   •  ferrous sulfate 325 (65 FE) MG EC tablet, TAKE 1 TABLET BY MOUTH ONCE DAILY WITH BREAKFAST, Disp: , Rfl:   •  fluticasone (FLONASE) 50 MCG/ACT nasal spray, 1 spray into the nostril(s) as directed by provider Daily., Disp: 1 bottle, Rfl: 5  •  glimepiride (AMARYL) 4 MG tablet, take 1 tablet by mouth every morning BEFORE BREAKFAST, Disp: , Rfl:   •  HYDROcodone-acetaminophen (NORCO) 7.5-325 MG per tablet, every 8 (eight) hours., Disp: , Rfl:   •  ipratropium-albuterol (DUO-NEB) 0.5-2.5 mg/3 ml nebulizer, Inhale 3 mL., Disp: , Rfl:   •  lisinopril (PRINIVIL,ZESTRIL) 5 MG tablet, take 1 tablet by mouth once daily, Disp: , Rfl:   •  metFORMIN ER (GLUCOPHAGE-XR) 500 MG 24 hr tablet, 1 ac breakfast and supper, Disp: , Rfl:   •  metoprolol tartrate (LOPRESSOR) 25 MG tablet, Take 0.5 tablets by mouth 2 times daily, Disp: , Rfl:   •  montelukast (SINGULAIR) 10 MG tablet, Take 10 mg by mouth Every Night., Disp: , Rfl:   •  pregabalin  "(LYRICA) 75 MG capsule, Take 75 mg by mouth 2 (Two) Times a Day., Disp: , Rfl:   •  tiZANidine (ZANAFLEX) 4 MG tablet, take 1 tablet by mouth every 8 hours if needed for muscle spasm, Disp: , Rfl:   •  umeclidinium-vilanterol (ANORO ELLIPTA) 62.5-25 MCG/INH aerosol powder  inhaler, Inhale 1 puff Daily., Disp: 1 each, Rfl: 5  •  vitamin D (ERGOCALCIFEROL) 77263 units capsule capsule, take 1 capsule by mouth every week, Disp: , Rfl:     Objective:   Physical Exam   Constitutional: She is oriented to person, place, and time. She appears well-developed and well-nourished. No distress.   HENT:   Head: Normocephalic and atraumatic.   Mouth/Throat: Oropharynx is clear and moist.   Eyes: Conjunctivae and EOM are normal. Pupils are equal, round, and reactive to light. No scleral icterus.   Neck: Normal range of motion. Neck supple. No JVD present. No tracheal deviation present. No thyromegaly present.   Cardiovascular: Normal rate, regular rhythm, S1 normal, S2 normal, normal heart sounds, intact distal pulses and normal pulses. PMI is not displaced. Exam reveals no gallop and no friction rub.   No murmur heard.  Pulmonary/Chest: Effort normal and breath sounds normal. No stridor. No respiratory distress. She has no wheezes. She has no rales.   Abdominal: Soft. Bowel sounds are normal. She exhibits no distension and no mass. There is no tenderness. There is no rebound and no guarding.   Musculoskeletal: Normal range of motion. She exhibits no edema or deformity.   Neurological: She is alert and oriented to person, place, and time. She displays normal reflexes. No cranial nerve deficit. Coordination normal.   Skin: Skin is warm and dry. No rash noted. She is not diaphoretic. No erythema.   Psychiatric: She has a normal mood and affect. Her behavior is normal. Thought content normal.     Blood pressure 110/62, pulse 78, resp. rate 20, height 162.6 cm (64.02\"), weight 129 kg (284 lb), SpO2 93 %.   Lab Review:     Assessment: " "      1. Benign hypertension  Excellent blood pressure control.    2. Cor pulmonale (chronic)  Stable symptoms.    3. Pulmonary hypertension (CMS/HCC)  Primarily from obesity, obstructive sleep apnea, emphysema and Pickwickian syndrome.    4. Centrilobular emphysema (CMS/[facility])  The patient no longer smokes but uses \"electronic cigarettes\".    5. Class 3 severe obesity due to excess calories with serious comorbidity and body mass index (BMI) of 45.0 to 49.9 in adult (CMS/[facility])  The patient has been able to lose 40 pounds of weight since her last visit.    6. Type 2 diabetes mellitus with hyperglycemia, without long-term current use of insulin (CMS/[facility])  I suspect this is primarily insulin resistance from morbid obesity.    7. Shortness of breath  Her symptoms have improved dramatically with weight loss.    Procedures    Plan:     No changes to her medication therapy have been made at today's visit.  No additional cardiovascular testing is indicated.  The patient has been cautioned regarding the recent epidemic of lung injury associated with electronic cigarettes.  The patient is encouraged on her efforts at diet exercise and weight management.      "

## 2019-10-28 ENCOUNTER — HOSPITAL ENCOUNTER (OUTPATIENT)
Dept: MAMMOGRAPHY | Facility: HOSPITAL | Age: 54
Discharge: HOME | End: 2019-10-28
Payer: MEDICAID

## 2019-10-28 DIAGNOSIS — Z12.31 BREAST CANCER SCREENING BY MAMMOGRAM: ICD-10-CM

## 2019-10-28 PROCEDURE — 77067 SCR MAMMO BI INCL CAD: CPT

## 2019-11-01 DIAGNOSIS — M54.2 CHRONIC NECK AND BACK PAIN: ICD-10-CM

## 2019-11-01 DIAGNOSIS — G89.29 CHRONIC NECK AND BACK PAIN: ICD-10-CM

## 2019-11-01 DIAGNOSIS — M54.9 CHRONIC NECK AND BACK PAIN: ICD-10-CM

## 2019-11-05 RX ORDER — TIZANIDINE 4 MG/1
TABLET ORAL
Qty: 90 TABLET | Refills: 5 | Status: SHIPPED | OUTPATIENT
Start: 2019-11-05 | End: 2020-05-26

## 2019-11-19 ENCOUNTER — OFFICE VISIT (OUTPATIENT)
Dept: PULMONOLOGY | Facility: CLINIC | Age: 54
End: 2019-11-19

## 2019-11-19 VITALS
BODY MASS INDEX: 48.14 KG/M2 | SYSTOLIC BLOOD PRESSURE: 128 MMHG | RESPIRATION RATE: 18 BRPM | OXYGEN SATURATION: 94 % | DIASTOLIC BLOOD PRESSURE: 74 MMHG | HEART RATE: 60 BPM | HEIGHT: 64 IN | WEIGHT: 282 LBS

## 2019-11-19 DIAGNOSIS — G47.34 NOCTURNAL HYPOXIA: ICD-10-CM

## 2019-11-19 DIAGNOSIS — Z78.9 ELECTRONIC CIGARETTE USE: ICD-10-CM

## 2019-11-19 DIAGNOSIS — J44.9 CHRONIC OBSTRUCTIVE PULMONARY DISEASE, UNSPECIFIED COPD TYPE (HCC): ICD-10-CM

## 2019-11-19 DIAGNOSIS — G47.33 OBSTRUCTIVE SLEEP APNEA: ICD-10-CM

## 2019-11-19 DIAGNOSIS — R06.02 SHORTNESS OF BREATH: Primary | ICD-10-CM

## 2019-11-19 DIAGNOSIS — R09.02 EXERCISE HYPOXEMIA: ICD-10-CM

## 2019-11-19 DIAGNOSIS — R06.02 SHORTNESS OF BREATH: ICD-10-CM

## 2019-11-19 PROCEDURE — 99214 OFFICE O/P EST MOD 30 MIN: CPT | Performed by: NURSE PRACTITIONER

## 2019-11-19 PROCEDURE — 94729 DIFFUSING CAPACITY: CPT | Performed by: INTERNAL MEDICINE

## 2019-11-19 PROCEDURE — 94060 EVALUATION OF WHEEZING: CPT | Performed by: INTERNAL MEDICINE

## 2019-11-19 PROCEDURE — 94726 PLETHYSMOGRAPHY LUNG VOLUMES: CPT | Performed by: INTERNAL MEDICINE

## 2019-11-19 RX ORDER — IPRATROPIUM BROMIDE AND ALBUTEROL SULFATE 2.5; .5 MG/3ML; MG/3ML
3 SOLUTION RESPIRATORY (INHALATION) 4 TIMES DAILY PRN
Qty: 360 ML | Refills: 1 | Status: SHIPPED | OUTPATIENT
Start: 2019-11-19

## 2019-11-19 RX ORDER — MONTELUKAST SODIUM 10 MG/1
10 TABLET ORAL NIGHTLY
Qty: 30 TABLET | Refills: 5 | Status: SHIPPED | OUTPATIENT
Start: 2019-11-19 | End: 2020-06-11

## 2019-11-19 RX ORDER — FLUTICASONE PROPIONATE 50 MCG
1 SPRAY, SUSPENSION (ML) NASAL DAILY
Qty: 1 BOTTLE | Refills: 5 | Status: SHIPPED | OUTPATIENT
Start: 2019-11-19

## 2019-11-19 RX ORDER — ERGOCALCIFEROL 1.25 MG/1
CAPSULE ORAL
Qty: 4 CAPSULE | Refills: 5 | Status: SHIPPED | OUTPATIENT
Start: 2019-11-19 | End: 2020-10-26

## 2019-11-20 DIAGNOSIS — M54.9 CHRONIC BACK PAIN, UNSPECIFIED BACK LOCATION, UNSPECIFIED BACK PAIN LATERALITY: ICD-10-CM

## 2019-11-20 DIAGNOSIS — M19.90 ARTHRITIS: ICD-10-CM

## 2019-11-20 DIAGNOSIS — G89.29 CHRONIC BACK PAIN, UNSPECIFIED BACK LOCATION, UNSPECIFIED BACK PAIN LATERALITY: ICD-10-CM

## 2019-11-20 RX ORDER — HYDROCODONE BITARTRATE AND ACETAMINOPHEN 7.5; 325 MG/1; MG/1
1 TABLET ORAL EVERY 8 HOURS PRN
Qty: 90 TABLET | Refills: 0 | Status: SHIPPED | OUTPATIENT
Start: 2019-11-20 | End: 2019-12-17 | Stop reason: SDUPTHER

## 2019-11-24 DIAGNOSIS — I10 ESSENTIAL HYPERTENSION: ICD-10-CM

## 2019-11-25 RX ORDER — BUMETANIDE 1 MG/1
TABLET ORAL
Qty: 30 TABLET | Refills: 5 | Status: SHIPPED | OUTPATIENT
Start: 2019-11-25 | End: 2020-03-05 | Stop reason: SDUPTHER

## 2019-11-30 DIAGNOSIS — I10 ESSENTIAL HYPERTENSION: ICD-10-CM

## 2019-11-30 DIAGNOSIS — E11.40 TYPE 2 DIABETES MELLITUS WITH DIABETIC NEUROPATHY, WITHOUT LONG-TERM CURRENT USE OF INSULIN (HCC): ICD-10-CM

## 2019-12-01 RX ORDER — LISINOPRIL 5 MG/1
TABLET ORAL
Qty: 90 TABLET | Refills: 1 | Status: SHIPPED | OUTPATIENT
Start: 2019-12-01 | End: 2020-06-08

## 2019-12-03 RX ORDER — GLUCOSAMINE HCL/CHONDROITIN SU 500-400 MG
CAPSULE ORAL
Qty: 100 STRIP | Refills: 3 | Status: SHIPPED | OUTPATIENT
Start: 2019-12-03 | End: 2020-12-02

## 2019-12-05 ENCOUNTER — OFFICE VISIT (OUTPATIENT)
Dept: PRIMARY CARE CLINIC | Age: 54
End: 2019-12-05
Payer: MEDICAID

## 2019-12-05 VITALS
DIASTOLIC BLOOD PRESSURE: 70 MMHG | BODY MASS INDEX: 48.06 KG/M2 | SYSTOLIC BLOOD PRESSURE: 110 MMHG | OXYGEN SATURATION: 97 % | HEART RATE: 94 BPM | WEIGHT: 280 LBS

## 2019-12-05 DIAGNOSIS — G89.29 CHRONIC NECK AND BACK PAIN: ICD-10-CM

## 2019-12-05 DIAGNOSIS — M54.2 CHRONIC NECK AND BACK PAIN: ICD-10-CM

## 2019-12-05 DIAGNOSIS — E11.40 TYPE 2 DIABETES MELLITUS WITH DIABETIC NEUROPATHY, WITHOUT LONG-TERM CURRENT USE OF INSULIN (HCC): Primary | ICD-10-CM

## 2019-12-05 DIAGNOSIS — G57.90 NEUROPATHY OF FOOT, UNSPECIFIED LATERALITY: ICD-10-CM

## 2019-12-05 DIAGNOSIS — I10 ESSENTIAL HYPERTENSION: ICD-10-CM

## 2019-12-05 DIAGNOSIS — M54.9 CHRONIC NECK AND BACK PAIN: ICD-10-CM

## 2019-12-05 LAB — HBA1C MFR BLD: 6.5 %

## 2019-12-05 PROCEDURE — 99213 OFFICE O/P EST LOW 20 MIN: CPT | Performed by: NURSE PRACTITIONER

## 2019-12-05 PROCEDURE — 90471 IMMUNIZATION ADMIN: CPT | Performed by: NURSE PRACTITIONER

## 2019-12-05 PROCEDURE — 90688 IIV4 VACCINE SPLT 0.5 ML IM: CPT | Performed by: NURSE PRACTITIONER

## 2019-12-05 PROCEDURE — 83036 HEMOGLOBIN GLYCOSYLATED A1C: CPT | Performed by: NURSE PRACTITIONER

## 2019-12-05 ASSESSMENT — ENCOUNTER SYMPTOMS
SORE THROAT: 0
ABDOMINAL PAIN: 0
COUGH: 0
EYE PAIN: 0
NAUSEA: 0
VOMITING: 0
SHORTNESS OF BREATH: 0

## 2019-12-17 DIAGNOSIS — M54.9 CHRONIC BACK PAIN, UNSPECIFIED BACK LOCATION, UNSPECIFIED BACK PAIN LATERALITY: ICD-10-CM

## 2019-12-17 DIAGNOSIS — G89.29 CHRONIC BACK PAIN, UNSPECIFIED BACK LOCATION, UNSPECIFIED BACK PAIN LATERALITY: ICD-10-CM

## 2019-12-17 DIAGNOSIS — M19.90 ARTHRITIS: ICD-10-CM

## 2019-12-17 RX ORDER — HYDROCODONE BITARTRATE AND ACETAMINOPHEN 7.5; 325 MG/1; MG/1
1 TABLET ORAL EVERY 8 HOURS PRN
Qty: 90 TABLET | Refills: 0 | Status: SHIPPED | OUTPATIENT
Start: 2019-12-17 | End: 2020-01-16 | Stop reason: SDUPTHER

## 2019-12-30 ENCOUNTER — OFFICE VISIT (OUTPATIENT)
Dept: PRIMARY CARE CLINIC | Age: 54
End: 2019-12-30
Payer: MEDICAID

## 2019-12-30 VITALS
SYSTOLIC BLOOD PRESSURE: 117 MMHG | WEIGHT: 283 LBS | OXYGEN SATURATION: 87 % | DIASTOLIC BLOOD PRESSURE: 64 MMHG | HEART RATE: 59 BPM | RESPIRATION RATE: 20 BRPM | BODY MASS INDEX: 48.58 KG/M2

## 2019-12-30 PROCEDURE — 99213 OFFICE O/P EST LOW 20 MIN: CPT | Performed by: NURSE PRACTITIONER

## 2019-12-30 RX ORDER — PREDNISONE 20 MG/1
20 TABLET ORAL 2 TIMES DAILY
Qty: 10 TABLET | Refills: 0 | Status: SHIPPED | OUTPATIENT
Start: 2019-12-30 | End: 2020-01-04

## 2019-12-30 RX ORDER — IBUPROFEN 800 MG/1
800 TABLET ORAL 2 TIMES DAILY PRN
Qty: 60 TABLET | Refills: 1 | Status: SHIPPED | OUTPATIENT
Start: 2019-12-30 | End: 2020-03-05 | Stop reason: SDUPTHER

## 2019-12-30 RX ORDER — CYCLOBENZAPRINE HCL 5 MG
5 TABLET ORAL 2 TIMES DAILY PRN
Qty: 10 TABLET | Refills: 0 | Status: SHIPPED | OUTPATIENT
Start: 2019-12-30 | End: 2020-01-09

## 2019-12-30 RX ORDER — METHYLPREDNISOLONE SODIUM SUCCINATE 125 MG/2ML
125 INJECTION, POWDER, LYOPHILIZED, FOR SOLUTION INTRAMUSCULAR; INTRAVENOUS ONCE
Status: COMPLETED | OUTPATIENT
Start: 2019-12-30 | End: 2019-12-30

## 2019-12-30 RX ADMIN — METHYLPREDNISOLONE SODIUM SUCCINATE 125 MG: 125 INJECTION, POWDER, LYOPHILIZED, FOR SOLUTION INTRAMUSCULAR; INTRAVENOUS at 10:37

## 2019-12-30 NOTE — PROGRESS NOTES
MG TABS TAKE 1 TABLET BY MOUTH ONCE A DAY WITH BREAKFAST 30 tablet 4    glimepiride (AMARYL) 4 MG tablet take 1 tablet by mouth every morning BEFORE BREAKFAST 30 tablet 5    triamcinolone (KENALOG) 0.1 % cream apply to affected area twice a day 30 g 0    umeclidinium-vilanterol (ANORO ELLIPTA) 62.5-25 MCG/INH AEPB inhaler Inhale 1 puff into the lungs daily 1 each 5    ipratropium-albuterol (DUONEB) 0.5-2.5 (3) MG/3ML SOLN nebulizer solution Inhale 3 mLs into the lungs every 4 hours as needed for Shortness of Breath 360 mL 0    albuterol sulfate HFA (VENTOLIN HFA) 108 (90 BASE) MCG/ACT inhaler Inhale 2 puffs into the lungs every 6 hours as needed for Wheezing 1 Inhaler 3        Review of Systems   Constitutional: Negative. HENT: Negative. Tinnitus: cyclobenz. Respiratory: Negative. Cardiovascular: Negative. Gastrointestinal: Negative. Musculoskeletal: Positive for arthralgias, joint swelling and myalgias. Negative for back pain. Neurological: Negative.         Past Medical History:   Diagnosis Date    Arthritis     left foot    Carpal tunnel syndrome, bilateral     Chronic back pain     Diverticulitis     Foot fracture, left     Hypertension     Type II or unspecified type diabetes mellitus without mention of complication, not stated as uncontrolled     Unspecified sleep apnea      Past Surgical History:   Procedure Laterality Date    COLOSTOMY  2004        FRACTURE SURGERY      left foot    REVISION COLOSTOMY  2005        TUBAL LIGATION       Family History   Problem Relation Age of Onset    Lung Cancer Mother     Heart Attack Father     Diabetes Paternal Grandfather     Stroke Paternal Grandfather       Social History     Tobacco Use   Smoking Status Former Smoker    Packs/day: 2.00    Years: 20.00    Pack years: 40.00    Last attempt to quit: 1/1/2005    Years since quitting: 15.0   Smokeless Tobacco Never Used       OBJECTIVE:   Wt Readings from Last 3 Encounters: 12/30/19 283 lb (128.4 kg)   12/05/19 280 lb (127 kg)   09/05/19 276 lb (125.2 kg)     BP Readings from Last 3 Encounters:   12/30/19 117/64   12/05/19 110/70   09/05/19 112/78       /64 (Site: Right Upper Arm, Position: Sitting, Cuff Size: Large Adult)   Pulse 59   Resp 20   Wt 283 lb (128.4 kg)   SpO2 (!) 87%   BMI 48.58 kg/m²      Physical Exam  Vitals signs and nursing note reviewed. Constitutional:       Appearance: Normal appearance. Neck:      Musculoskeletal: Normal range of motion and neck supple. Cardiovascular:      Rate and Rhythm: Normal rate and regular rhythm. Pulses: Normal pulses. Pulmonary:      Effort: Pulmonary effort is normal.      Breath sounds: Normal breath sounds. Musculoskeletal: Normal range of motion. General: Swelling and tenderness present. No deformity. Comments: Right knee with TTP, no crepitus. There is trace swelling right knee. LROM right knee. Full ROM all other extrems. Skin:     General: Skin is warm and dry. Capillary Refill: Capillary refill takes less than 2 seconds. Findings: No bruising or erythema. Neurological:      General: No focal deficit present. Mental Status: She is alert and oriented to person, place, and time. Sensory: No sensory deficit.       Coordination: Coordination normal.   Psychiatric:         Mood and Affect: Mood normal.         Behavior: Behavior normal.         Lab Results   Component Value Date     08/13/2019    K 4.4 08/13/2019     08/13/2019    CO2 27 08/13/2019    GLUCOSE 93 08/13/2019    BUN 19 08/13/2019    CREATININE 1.0 08/13/2019    CALCIUM 9.6 08/13/2019    PROT 6.9 08/13/2019    LABALBU 4.2 08/13/2019    BILITOT 0.7 08/13/2019    ALT 15 08/13/2019    AST 12 08/13/2019       Hemoglobin A1C (%)   Date Value   12/05/2019 6.5     Microscopic Examination (no units)   Date Value   04/13/2015 YES     Microalbumin, Random Urine (mg/dL)   Date Value   03/09/2018 <1.20 LDL Calculated (mg/dL)   Date Value   08/13/2019 43         Lab Results   Component Value Date    WBC 7.0 08/13/2019    NEUTROABS 3.8 08/13/2019    HGB 13.5 08/13/2019    HCT 41.6 08/13/2019    MCV 95.2 08/13/2019     08/13/2019       Lab Results   Component Value Date    TSH 3.93 04/13/2015         ASSESSMENT/PLAN:     1. Pain and swelling of right knee  IM steroid. Rest, compression brace, ice PRN, elevate PRN, tylenol/ibuprofen PRN. Avoid heavy lifting/pulling. Xray today. Will follow. RTC if needed. - methylPREDNISolone sodium (SOLU-MEDROL) injection 125 mg  - ibuprofen (ADVIL;MOTRIN) 800 MG tablet; Take 1 tablet by mouth 2 times daily as needed for Pain  Dispense: 60 tablet; Refill: 1  - predniSONE (DELTASONE) 20 MG tablet; Take 1 tablet by mouth 2 times daily for 5 days  Dispense: 10 tablet; Refill: 0  - XR KNEE RIGHT (MIN 4 VIEWS); Future  - Elastic Bandages & Supports (KNEE BRACE) MISC; Right knee brace. Size XL. Elastic/compression. Dispense: 1 each; Refill: 0        Orders Placed This Encounter   Medications    methylPREDNISolone sodium (SOLU-MEDROL) injection 125 mg    ibuprofen (ADVIL;MOTRIN) 800 MG tablet     Sig: Take 1 tablet by mouth 2 times daily as needed for Pain     Dispense:  60 tablet     Refill:  1    predniSONE (DELTASONE) 20 MG tablet     Sig: Take 1 tablet by mouth 2 times daily for 5 days     Dispense:  10 tablet     Refill:  0    cyclobenzaprine (FLEXERIL) 5 MG tablet     Sig: Take 1 tablet by mouth 2 times daily as needed for Muscle spasms     Dispense:  10 tablet     Refill:  0    Elastic Bandages & Supports (KNEE BRACE) MISC     Sig: Right knee brace. Size XL. Elastic/compression.      Dispense:  1 each     Refill:  0

## 2019-12-30 NOTE — PATIENT INSTRUCTIONS
dispose of used patches by folding them in half so that the sticky sides meet, and then flushing them down a toilet. They should not be placed in the household trash where children or pets can find them. · If you have any questions, ask your provider or pharmacist for more information. · Be sure to keep all appointments for provider visits or tests. Thank you for enrolling in [address] Please follow the instructions below to securely access your online medical record. [facility] allows you to send messages to your doctor, view your test results, renew your prescriptions, schedule appointments, and more. How Do I Sign Up? In your Internet browser, go to [email or web address]  Click on the Sign Up Now link in the Sign In box. You will see the New Member Sign Up page. Enter your [facility] Access Code exactly as it appears below. You will not need to use this code after youve completed the sign-up process. If you do not sign up before the expiration date, you must request a new code. [facility] Access Code: [email or web address]  Expires: 1/19/2020  3:14 PM    Enter your Social Security Number ([id number]) and Date of Birth (mm/dd/yyyy) as indicated and click Submit. You will be taken to the next sign-up page. Create a [facility] ID. This will be your [facility] login ID and cannot be changed, so think of one that is secure and easy to remember. Create a [facility] password. You can change your password at any time. Enter your Password Reset Question and Answer. This can be used at a later time if you forget your password. Enter your e-mail address. You will receive e-mail notification when new information is available in [address] Click Sign Up. You can now view your medical record. Additional Information  If you have questions, please contact your physician practice where you receive care. Remember, [facility] is NOT to be used for urgent needs. For medical emergencies, dial 911.   ·   The medication

## 2020-01-02 ENCOUNTER — HOSPITAL ENCOUNTER (OUTPATIENT)
Dept: GENERAL RADIOLOGY | Facility: HOSPITAL | Age: 55
Discharge: HOME | End: 2020-01-02
Payer: MEDICAID

## 2020-01-02 ENCOUNTER — HOSPITAL ENCOUNTER (OUTPATIENT)
Facility: HOSPITAL | Age: 55
Discharge: HOME | End: 2020-01-02
Payer: MEDICAID

## 2020-01-02 PROCEDURE — 73564 X-RAY EXAM KNEE 4 OR MORE: CPT

## 2020-01-07 ENCOUNTER — TELEPHONE (OUTPATIENT)
Dept: PRIMARY CARE CLINIC | Age: 55
End: 2020-01-07

## 2020-01-16 RX ORDER — HYDROCODONE BITARTRATE AND ACETAMINOPHEN 7.5; 325 MG/1; MG/1
1 TABLET ORAL EVERY 8 HOURS PRN
Qty: 90 TABLET | Refills: 0 | Status: SHIPPED | OUTPATIENT
Start: 2020-01-16 | End: 2020-02-12 | Stop reason: SDUPTHER

## 2020-01-21 ASSESSMENT — ENCOUNTER SYMPTOMS
GASTROINTESTINAL NEGATIVE: 1
RESPIRATORY NEGATIVE: 1
BACK PAIN: 0

## 2020-01-23 ENCOUNTER — OFFICE VISIT (OUTPATIENT)
Dept: PRIMARY CARE CLINIC | Age: 55
End: 2020-01-23
Payer: MEDICAID

## 2020-01-23 VITALS
WEIGHT: 279 LBS | SYSTOLIC BLOOD PRESSURE: 110 MMHG | BODY MASS INDEX: 47.89 KG/M2 | DIASTOLIC BLOOD PRESSURE: 80 MMHG | TEMPERATURE: 98.1 F | HEART RATE: 80 BPM | OXYGEN SATURATION: 91 %

## 2020-01-23 LAB
BILIRUBIN, POC: NORMAL
BLOOD URINE, POC: NORMAL
CLARITY, POC: CLEAR
COLOR, POC: NORMAL
GLUCOSE URINE, POC: NORMAL
INFLUENZA A ANTIBODY: NORMAL
INFLUENZA B ANTIBODY: NORMAL
KETONES, POC: NORMAL
LEUKOCYTE EST, POC: NORMAL
NITRITE, POC: NORMAL
PH, POC: 6
PROTEIN, POC: NORMAL
SPECIFIC GRAVITY, POC: 1.01
UROBILINOGEN, POC: 0.2

## 2020-01-23 PROCEDURE — 99213 OFFICE O/P EST LOW 20 MIN: CPT | Performed by: NURSE PRACTITIONER

## 2020-01-23 PROCEDURE — 87804 INFLUENZA ASSAY W/OPTIC: CPT | Performed by: NURSE PRACTITIONER

## 2020-01-23 PROCEDURE — 81002 URINALYSIS NONAUTO W/O SCOPE: CPT | Performed by: NURSE PRACTITIONER

## 2020-01-23 RX ORDER — METRONIDAZOLE 500 MG/1
500 TABLET ORAL 2 TIMES DAILY
Qty: 14 TABLET | Refills: 0 | Status: SHIPPED | OUTPATIENT
Start: 2020-01-23 | End: 2020-01-30

## 2020-01-23 RX ORDER — PROMETHAZINE HYDROCHLORIDE 25 MG/1
25 TABLET ORAL EVERY 6 HOURS PRN
Qty: 30 TABLET | Refills: 0 | Status: SHIPPED | OUTPATIENT
Start: 2020-01-23 | End: 2020-01-30

## 2020-01-23 ASSESSMENT — ENCOUNTER SYMPTOMS
VOMITING: 0
COUGH: 0
EYE PAIN: 0
ABDOMINAL PAIN: 0
SORE THROAT: 0
NAUSEA: 1
SHORTNESS OF BREATH: 0

## 2020-01-23 NOTE — PROGRESS NOTES
Chief Complaint   Patient presents with    Nausea     Patinet here today for nasuea, cold chills. She states she has been sick since last wednesday. She had a low grade fever last night. She states her grandkids have had the flu.  Chills       Have you seen any other physician or provider since your last visit? no    Have you had any other diagnostic tests since your last visit? no    Have you changed or stopped any medications since your last visit including any over-the-counter medicines, vitamins, or herbal medicines? no     Are you taking all your prescribed medications? Yes  If NO, why? -  N/A        REVIEW OF SYSTEMS:  Review of Systems   Constitutional: Negative for chills and fever. HENT: Negative for ear pain and sore throat. Eyes: Negative for pain and visual disturbance. Respiratory: Negative for cough and shortness of breath. Cardiovascular: Negative for chest pain, palpitations and leg swelling. Gastrointestinal: Positive for nausea. Negative for abdominal pain and vomiting. Genitourinary: Negative for dysuria and hematuria. Musculoskeletal: Negative for joint swelling. Skin: Negative for rash. Neurological: Negative for dizziness and weakness. Psychiatric/Behavioral: Negative for sleep disturbance.

## 2020-01-31 NOTE — PROGRESS NOTES
capsule by mouth every week Yes JACQUELYN Pacheco   tiZANidine (ZANAFLEX) 4 MG tablet take 1 tablet by mouth every 8 hours if needed for muscle spasm Yes JACQUELYN Pacheco   montelukast (SINGULAIR) 10 MG tablet take 1 tablet by mouth at bedtime Yes JACQUELYN Pacheco   [facility] DELICA LANCETS 30S MISC TEST TWICE A DAY Yes Sharon Cyr MD   metoprolol tartrate (LOPRESSOR) 25 MG tablet TAKE ONE HALF TABLET BY MOUTH TWO TIMES DAILY Yes JACQUELYN Pacheco   metFORMIN (GLUCOPHAGE-XR) 500 MG extended release tablet TAKE 1 TABLET BY MOUTH DAILY BEFORE BREAKFAST AND SUPPER Yes JACQUELYN Pacheco   Ferrous Sulfate (IRON) 325 (65 Fe) MG TABS TAKE 1 TABLET BY MOUTH ONCE A DAY WITH BREAKFAST Yes Nanette Mensah APRN   triamcinolone (KENALOG) 0.1 % cream apply to affected area twice a day Yes Nanette Mensah APRN   umeclidinium-vilanterol (ANORO ELLIPTA) 62.5-25 MCG/INH AEPB inhaler Inhale 1 puff into the lungs daily Yes Nanette Mensah APRN   ipratropium-albuterol (DUONEB) 0.5-2.5 (3) MG/3ML SOLN nebulizer solution Inhale 3 mLs into the lungs every 4 hours as needed for Shortness of Breath Yes Javy Poag, DO   Blood Glucose Monitoring Suppl (ONE TOUCH ULTRA MINI) w/Device KIT use as directed Yes Javy Poag, DO   albuterol sulfate HFA (VENTOLIN HFA) 108 (90 BASE) MCG/ACT inhaler Inhale 2 puffs into the lungs every 6 hours as needed for Wheezing Yes Javy Poag, DO   Elastic Bandages & Supports (CARPAL TUNNEL WRIST STABILIZER) MISC Bilateral carpal tunnel splints for carpal tunnel syndrome bilaterally (Dx: G56.01, G56.02) Yes Leta Perry, DO   diclofenac sodium (VOLTAREN) 1 % GEL Apply 2 g topically 4 times daily as needed for Pain  Lowmattie Mensah APRN   atorvastatin (LIPITOR) 40 MG tablet TAKE 1 TABLET BY MOUTH ONCE DAILY  JACQUELYN Pacheco   pregabalin (LYRICA) 100 MG capsule take 1 capsule by mouth twice a day  Lowmattie Mensah APRN   fluticasone (FLONASE) 50 MCG/ACT nasal spray 2 sprays by Each Nostril route daily 1 Spray in each nostril  JACQUELYN Mccarty       ASSESSMENT:  1. Nausea    2. Diarrhea, unspecified type    3. Colitis        PLAN:  Orders Placed This Encounter   Medications    metroNIDAZOLE (FLAGYL) 500 MG tablet     Sig: Take 1 tablet by mouth 2 times daily for 7 days     Dispense:  14 tablet     Refill:  0    promethazine (PHENERGAN) 25 MG tablet     Sig: Take 1 tablet by mouth every 6 hours as needed for Nausea     Dispense:  30 tablet     Refill:  0     Orders Placed This Encounter   Procedures    POCT Influenza A/B    POCT Urinalysis no Micro     Rest. Increase fluids. Call if no improvement in a few days.

## 2020-02-05 RX ORDER — PREGABALIN 100 MG/1
CAPSULE ORAL
Qty: 60 CAPSULE | Refills: 5 | Status: SHIPPED | OUTPATIENT
Start: 2020-02-05 | End: 2020-03-05 | Stop reason: SDUPTHER

## 2020-02-10 ENCOUNTER — TELEPHONE (OUTPATIENT)
Dept: PRIMARY CARE CLINIC | Age: 55
End: 2020-02-10

## 2020-02-10 RX ORDER — BENZONATATE 200 MG/1
200 CAPSULE ORAL 3 TIMES DAILY PRN
Qty: 30 CAPSULE | Refills: 2 | Status: SHIPPED | OUTPATIENT
Start: 2020-02-10 | End: 2020-02-20

## 2020-02-12 RX ORDER — HYDROCODONE BITARTRATE AND ACETAMINOPHEN 7.5; 325 MG/1; MG/1
1 TABLET ORAL EVERY 8 HOURS PRN
Qty: 90 TABLET | Refills: 0 | Status: SHIPPED | OUTPATIENT
Start: 2020-02-12 | End: 2020-03-11 | Stop reason: SDUPTHER

## 2020-02-12 NOTE — TELEPHONE ENCOUNTER
Patient called and stated her pain medication would be due on Sunday. Ask if she could get a refill before then.

## 2020-02-20 ENCOUNTER — OFFICE VISIT (OUTPATIENT)
Dept: PRIMARY CARE CLINIC | Age: 55
End: 2020-02-20
Payer: MEDICAID

## 2020-02-20 ENCOUNTER — HOSPITAL ENCOUNTER (OUTPATIENT)
Facility: HOSPITAL | Age: 55
Discharge: HOME | End: 2020-02-20
Payer: MEDICAID

## 2020-02-20 VITALS
DIASTOLIC BLOOD PRESSURE: 80 MMHG | WEIGHT: 272.8 LBS | HEIGHT: 64 IN | HEART RATE: 85 BPM | BODY MASS INDEX: 46.57 KG/M2 | OXYGEN SATURATION: 97 % | SYSTOLIC BLOOD PRESSURE: 122 MMHG

## 2020-02-20 PROCEDURE — 83036 HEMOGLOBIN GLYCOSYLATED A1C: CPT

## 2020-02-20 PROCEDURE — 83690 ASSAY OF LIPASE: CPT

## 2020-02-20 PROCEDURE — 99214 OFFICE O/P EST MOD 30 MIN: CPT | Performed by: NURSE PRACTITIONER

## 2020-02-20 PROCEDURE — 80053 COMPREHEN METABOLIC PANEL: CPT

## 2020-02-20 PROCEDURE — 85025 COMPLETE CBC W/AUTO DIFF WBC: CPT

## 2020-02-20 PROCEDURE — 82150 ASSAY OF AMYLASE: CPT

## 2020-02-20 RX ORDER — AMOXICILLIN 875 MG/1
875 TABLET, COATED ORAL 2 TIMES DAILY
Qty: 20 TABLET | Refills: 0 | Status: SHIPPED | OUTPATIENT
Start: 2020-02-20 | End: 2020-03-01

## 2020-02-20 RX ORDER — ONDANSETRON 4 MG/1
4 TABLET, FILM COATED ORAL EVERY 8 HOURS PRN
Qty: 30 TABLET | Refills: 2 | Status: SHIPPED | OUTPATIENT
Start: 2020-02-20 | End: 2020-11-12

## 2020-02-20 RX ORDER — OMEPRAZOLE 40 MG/1
40 CAPSULE, DELAYED RELEASE ORAL
Qty: 30 CAPSULE | Refills: 5 | Status: SHIPPED | OUTPATIENT
Start: 2020-02-20 | End: 2020-08-05

## 2020-02-20 ASSESSMENT — ENCOUNTER SYMPTOMS
COUGH: 1
SHORTNESS OF BREATH: 0
SORE THROAT: 0
NAUSEA: 1
EYE PAIN: 0
VOMITING: 0
ABDOMINAL PAIN: 0

## 2020-02-20 NOTE — PROGRESS NOTES
Chief Complaint   Patient presents with    Nausea     Patient here today witha productive cough, and nausea X 5 days. She states her sugar has also been no more than 100 for 5 weeks.  Cough       Have you seen any other physician or provider since your last visit? no    Have you had any other diagnostic tests since your last visit? no    Have you changed or stopped any medications since your last visit including any over-the-counter medicines, vitamins, or herbal medicines? no     Are you taking all your prescribed medications? Yes  If NO, why? -  N/A      Sick visit did not address HM. REVIEW OF SYSTEMS:  Review of Systems   Constitutional: Negative for chills and fever. HENT: Negative for ear pain and sore throat. Eyes: Negative for pain and visual disturbance. Respiratory: Positive for cough. Negative for shortness of breath. Cardiovascular: Negative for chest pain, palpitations and leg swelling. Gastrointestinal: Positive for nausea. Negative for abdominal pain and vomiting. Genitourinary: Negative for dysuria and hematuria. Musculoskeletal: Negative for joint swelling. Skin: Negative for rash. Neurological: Negative for dizziness and weakness. Psychiatric/Behavioral: Negative for sleep disturbance.

## 2020-02-21 LAB
A/G RATIO: 1.3 (ref 0.8–2)
ALBUMIN SERPL-MCNC: 4.2 G/DL (ref 3.4–4.8)
ALP BLD-CCNC: 85 U/L (ref 25–100)
ALT SERPL-CCNC: 14 U/L (ref 4–36)
AMYLASE: 41 U/L (ref 20–104)
ANION GAP SERPL CALCULATED.3IONS-SCNC: 12 MMOL/L (ref 3–16)
AST SERPL-CCNC: 13 U/L (ref 8–33)
BASOPHILS ABSOLUTE: 0.1 K/UL (ref 0–0.1)
BASOPHILS RELATIVE PERCENT: 0.5 %
BILIRUB SERPL-MCNC: 1 MG/DL (ref 0.3–1.2)
BUN BLDV-MCNC: 19 MG/DL (ref 6–20)
CALCIUM SERPL-MCNC: 9.7 MG/DL (ref 8.5–10.5)
CHLORIDE BLD-SCNC: 94 MMOL/L (ref 98–107)
CO2: 32 MMOL/L (ref 20–30)
CREAT SERPL-MCNC: 1.1 MG/DL (ref 0.4–1.2)
EOSINOPHILS ABSOLUTE: 0.2 K/UL (ref 0–0.4)
EOSINOPHILS RELATIVE PERCENT: 1.9 %
GFR AFRICAN AMERICAN: >59
GFR NON-AFRICAN AMERICAN: 51
GLOBULIN: 3.2 G/DL
GLUCOSE BLD-MCNC: 98 MG/DL (ref 74–106)
HBA1C MFR BLD: 6.5 %
HCT VFR BLD CALC: 42.5 % (ref 37–47)
HEMOGLOBIN: 13.5 G/DL (ref 11.5–16.5)
IMMATURE GRANULOCYTES #: 0 K/UL
IMMATURE GRANULOCYTES %: 0.3 % (ref 0–5)
LIPASE: 20 U/L (ref 5.6–51.3)
LYMPHOCYTES ABSOLUTE: 2.4 K/UL (ref 1.5–4)
LYMPHOCYTES RELATIVE PERCENT: 21.4 %
MCH RBC QN AUTO: 30.4 PG (ref 27–32)
MCHC RBC AUTO-ENTMCNC: 31.8 G/DL (ref 31–35)
MCV RBC AUTO: 95.7 FL (ref 80–100)
MONOCYTES ABSOLUTE: 0.9 K/UL (ref 0.2–0.8)
MONOCYTES RELATIVE PERCENT: 7.7 %
NEUTROPHILS ABSOLUTE: 7.7 K/UL (ref 2–7.5)
NEUTROPHILS RELATIVE PERCENT: 68.2 %
PDW BLD-RTO: 12.7 % (ref 11–16)
PLATELET # BLD: 275 K/UL (ref 150–400)
PMV BLD AUTO: 11.7 FL (ref 6–10)
POTASSIUM SERPL-SCNC: 4.1 MMOL/L (ref 3.4–5.1)
RBC # BLD: 4.44 M/UL (ref 3.8–5.8)
SODIUM BLD-SCNC: 138 MMOL/L (ref 136–145)
TOTAL PROTEIN: 7.4 G/DL (ref 6.4–8.3)
WBC # BLD: 11.3 K/UL (ref 4–11)

## 2020-02-25 ENCOUNTER — TELEPHONE (OUTPATIENT)
Dept: PRIMARY CARE CLINIC | Age: 55
End: 2020-02-25

## 2020-03-03 ENCOUNTER — TELEPHONE (OUTPATIENT)
Dept: PRIMARY CARE CLINIC | Age: 55
End: 2020-03-03

## 2020-03-03 RX ORDER — TRIAMCINOLONE ACETONIDE 1 MG/G
CREAM TOPICAL
Qty: 30 G | Refills: 0 | Status: SHIPPED | OUTPATIENT
Start: 2020-03-03

## 2020-03-03 NOTE — PROGRESS NOTES
SUBJECTIVE:    Patient ID: Mari Danielle is a 62 y.o. female. Medical History Review  Past Medical, Family, and Social History reviewed and does contribute to the patient presenting condition    Health Maintenance Due   Topic Date Due    Hepatitis C screen  1965    DTaP/Tdap/Td vaccine (1 - Tdap) 04/17/1974    HIV screen  04/17/1978    Hepatitis B vaccine (1 of 3 - Risk 3-dose series) 04/17/1982    Shingles Vaccine (1 of 2) 04/17/2013    Diabetic retinal exam  05/10/2017    Cervical cancer screen  08/21/2017    Diabetic foot exam  03/09/2018    Diabetic microalbuminuria test  03/09/2019       HPI:   Chief Complaint   Patient presents with    Nausea     Patient here today witha productive cough, and nausea X 5 days. She states her sugar has also been no more than 100 for 5 weeks.  Cough   She states she stays nauseated all the time and has a poor appetite. The diarrhea has imrpoved but she still has it at times. She is very weak and has generalized abdominal pain. Patient's medications, allergies, past medical, surgical, social and family histories were reviewed and updated as appropriate. Review of Systems Reviewed and acurate. See MA note. OBJECTIVE:  /80   Pulse 85   Ht 5' 4\" (1.626 m)   Wt 272 lb 12.8 oz (123.7 kg)   SpO2 97%   BMI 46.83 kg/m²    Physical Exam  Vitals signs reviewed. Constitutional:       General: She is not in acute distress. Appearance: She is well-developed. HENT:      Head: Normocephalic. Right Ear: Tympanic membrane normal.      Left Ear: Tympanic membrane normal.      Mouth/Throat:      Pharynx: No oropharyngeal exudate. Eyes:      General: Lids are normal.   Neck:      Musculoskeletal: Neck supple. Cardiovascular:      Rate and Rhythm: Normal rate and regular rhythm. Heart sounds: Normal heart sounds. Pulmonary:      Effort: Pulmonary effort is normal.      Breath sounds: Normal breath sounds.    Abdominal: Component Value Date    WBC 11.3 (H) 02/20/2020    NEUTROABS 7.7 (H) 02/20/2020    HGB 13.5 02/20/2020    HCT 42.5 02/20/2020    MCV 95.7 02/20/2020     02/20/2020     Lab Results   Component Value Date    TSH 3.93 04/13/2015       Prior to Visit Medications    Medication Sig Taking? Authorizing Provider   ondansetron (ZOFRAN) 4 MG tablet Take 1 tablet by mouth every 8 hours as needed for Nausea or Vomiting Yes JACQUELYN Skinner   omeprazole (PRILOSEC) 40 MG delayed release capsule Take 1 capsule by mouth every morning (before breakfast) Yes JACQUELYN Skinner   HYDROcodone-acetaminophen (NORCO) 7.5-325 MG per tablet Take 1 tablet by mouth every 8 hours as needed for Pain for up to 30 days. Yes JACQUELYN Skinner   Ferrous Sulfate (IRON) 325 (65 Fe) MG TABS TAKE 1 TABLET BY MOUTH ONCE DAILY WITH BREAKFAST Yes JACQUELYN Skinner   pregabalin (LYRICA) 100 MG capsule TAKE 1 CAPSULE BY MOUTH TWICE A DAY Yes JACQUELYN Skinner   diclofenac sodium (VOLTAREN) 1 % GEL Apply 2 g topically 4 times daily as needed for Pain Yes JACQUELYN Skinner   atorvastatin (LIPITOR) 40 MG tablet TAKE 1 TABLET BY MOUTH ONCE DAILY Yes JACQUELYN Skinner   glimepiride (AMARYL) 4 MG tablet take 1 tablet by mouth every morning BEFORE BREAKFAST Yes JACQUELYN Skinner   ibuprofen (ADVIL;MOTRIN) 800 MG tablet Take 1 tablet by mouth 2 times daily as needed for Pain Yes JACQUELYN Silva CNP   Elastic Bandages & Supports (KNEE BRACE) MISC Right knee brace. Size XL. Elastic/compression. Yes JACQUELYN Silva CNP   blood glucose monitor strips Test 3 times a day & as needed for symptoms of irregular blood glucose.  Dx E11.9 Yes JACQUELYN Skinner   ONE TOUCH ULTRA TEST strip TEST twice a day Yes JACQUELYN Skinner   lisinopril (PRINIVIL;ZESTRIL) 5 MG tablet take 1 tablet by mouth once daily Yes JACQUELYN Skinner   bumetanide (BUMEX) 1 MG tablet take 1 tablet by mouth once daily Yes Han Pichardo APRN

## 2020-03-05 ENCOUNTER — OFFICE VISIT (OUTPATIENT)
Dept: PRIMARY CARE CLINIC | Age: 55
End: 2020-03-05
Payer: MEDICAID

## 2020-03-05 VITALS
BODY MASS INDEX: 47.2 KG/M2 | OXYGEN SATURATION: 91 % | DIASTOLIC BLOOD PRESSURE: 70 MMHG | SYSTOLIC BLOOD PRESSURE: 110 MMHG | WEIGHT: 275 LBS | HEART RATE: 77 BPM

## 2020-03-05 PROCEDURE — 90715 TDAP VACCINE 7 YRS/> IM: CPT | Performed by: NURSE PRACTITIONER

## 2020-03-05 PROCEDURE — 90471 IMMUNIZATION ADMIN: CPT | Performed by: NURSE PRACTITIONER

## 2020-03-05 PROCEDURE — 99213 OFFICE O/P EST LOW 20 MIN: CPT | Performed by: NURSE PRACTITIONER

## 2020-03-05 RX ORDER — PREGABALIN 100 MG/1
100 CAPSULE ORAL 2 TIMES DAILY
Qty: 60 CAPSULE | Refills: 5 | Status: SHIPPED | OUTPATIENT
Start: 2020-03-05 | End: 2020-09-30

## 2020-03-05 RX ORDER — BUMETANIDE 1 MG/1
TABLET ORAL
Qty: 30 TABLET | Refills: 5 | Status: SHIPPED | OUTPATIENT
Start: 2020-03-05 | End: 2020-08-17

## 2020-03-05 RX ORDER — LORATADINE 10 MG/1
10 TABLET ORAL DAILY PRN
Qty: 90 TABLET | Refills: 3 | COMMUNITY
Start: 2020-03-05 | End: 2021-01-01

## 2020-03-05 RX ORDER — SULFACETAMIDE SODIUM 100 MG/ML
1 SOLUTION/ DROPS OPHTHALMIC 4 TIMES DAILY
Qty: 1 BOTTLE | Refills: 0 | Status: SHIPPED | OUTPATIENT
Start: 2020-03-05 | End: 2020-03-12

## 2020-03-05 RX ORDER — IBUPROFEN 800 MG/1
800 TABLET ORAL 2 TIMES DAILY PRN
Qty: 60 TABLET | Refills: 1 | Status: SHIPPED | OUTPATIENT
Start: 2020-03-05 | End: 2020-06-29 | Stop reason: SDUPTHER

## 2020-03-05 RX ORDER — METFORMIN HYDROCHLORIDE 500 MG/1
TABLET, EXTENDED RELEASE ORAL
Qty: 60 TABLET | Refills: 5 | Status: SHIPPED | OUTPATIENT
Start: 2020-03-05 | End: 2020-11-12 | Stop reason: SDUPTHER

## 2020-03-05 ASSESSMENT — ENCOUNTER SYMPTOMS
NAUSEA: 1
ABDOMINAL PAIN: 0
SORE THROAT: 0
SHORTNESS OF BREATH: 0
COUGH: 0
EYE PAIN: 0
VOMITING: 0

## 2020-03-05 NOTE — PROGRESS NOTES
Component Value Date    TSH 3.93 04/13/2015       Prior to Visit Medications    Medication Sig Taking? Authorizing Provider   bumetanide (BUMEX) 1 MG tablet take 1 tablet by mouth once daily Yes JACQUELYN Correia   metFORMIN (GLUCOPHAGE-XR) 500 MG extended release tablet TAKE 1 TABLET BY MOUTH DAILY BEFORE BREAKFAST AND SUPPER Yes JACQUELYN Correia   ibuprofen (ADVIL;MOTRIN) 800 MG tablet Take 1 tablet by mouth 2 times daily as needed for Pain Yes JACQUELYN Correia   pregabalin (LYRICA) 100 MG capsule Take 1 capsule by mouth 2 times daily for 30 days. Yes JACQUELYN Correia   loratadine (CLARITIN) 10 MG tablet Take 1 tablet by mouth daily as needed (allergies) Yes JACQUELYN Correia   triamcinolone (KENALOG) 0.1 % cream APPLY TO AFFECTED AREA TWICE A DAY Yes JACQUELYN Correia   ondansetron (ZOFRAN) 4 MG tablet Take 1 tablet by mouth every 8 hours as needed for Nausea or Vomiting Yes JACQUELYN Correia   omeprazole (PRILOSEC) 40 MG delayed release capsule Take 1 capsule by mouth every morning (before breakfast) Yes JACQUELYN Correia   Ferrous Sulfate (IRON) 325 (65 Fe) MG TABS TAKE 1 TABLET BY MOUTH ONCE DAILY WITH BREAKFAST Yes JACQUELYN Correia   diclofenac sodium (VOLTAREN) 1 % GEL Apply 2 g topically 4 times daily as needed for Pain Yes JACQUELYN Correia   atorvastatin (LIPITOR) 40 MG tablet TAKE 1 TABLET BY MOUTH ONCE DAILY Yes JACQUELYN Correia   glimepiride (AMARYL) 4 MG tablet take 1 tablet by mouth every morning BEFORE BREAKFAST Yes JACQUELYN Correia   Elastic Bandages & Supports (KNEE BRACE) MISC Right knee brace. Size XL. Elastic/compression. Yes JACQUELYN Nog - CNP   blood glucose monitor strips Test 3 times a day & as needed for symptoms of irregular blood glucose.  Dx E11.9 Yes JACQUELYN Correia   ONE TOUCH ULTRA TEST strip TEST twice a day Yes JACQUELYN Correia   lisinopril (PRINIVIL;ZESTRIL) 5 MG tablet take 1 tablet by mouth once daily Yes Wendi Brunner JACQUELYN Gupta   vitamin D (ERGOCALCIFEROL) 1.25 MG (92541 UT) CAPS capsule take 1 capsule by mouth every week Yes JACQUELYN Quintanilla   tiZANidine (ZANAFLEX) 4 MG tablet take 1 tablet by mouth every 8 hours if needed for muscle spasm Yes JACQUELYN Quintanilla   montelukast (SINGULAIR) 10 MG tablet take 1 tablet by mouth at bedtime Yes JACQUELYN Quintanilla   metoprolol tartrate (LOPRESSOR) 25 MG tablet TAKE ONE HALF TABLET BY MOUTH TWO TIMES DAILY Yes JACQUELYN Quintanilla   umeclidinium-vilanterol (ANORO ELLIPTA) 62.5-25 MCG/INH AEPB inhaler Inhale 1 puff into the lungs daily Yes JACQUELYN Quintanilla   ipratropium-albuterol (DUONEB) 0.5-2.5 (3) MG/3ML SOLN nebulizer solution Inhale 3 mLs into the lungs every 4 hours as needed for Shortness of Breath Yes Gladies Grew, DO   Blood Glucose Monitoring Suppl (ONE TOUCH ULTRA MINI) w/Device KIT use as directed Yes Gladies Grew, DO   albuterol sulfate HFA (VENTOLIN HFA) 108 (90 BASE) MCG/ACT inhaler Inhale 2 puffs into the lungs every 6 hours as needed for Wheezing Yes Gladies Grew, DO   Elastic Bandages & Supports (CARPAL TUNNEL WRIST STABILIZER) MISC Bilateral carpal tunnel splints for carpal tunnel syndrome bilaterally (Dx: G56.01, G56.02) Yes Gladies Grew, DO   Lancets (ONETOUCH DELICA PLUS KOCIVY25M) [facility] TEST TWICE A DAY  JACQUELYN Quintanilla   docusate sodium (COLACE) 100 MG capsule Take 1 capsule by mouth 2 times daily  JACQUELYN Quintanilla   HYDROcodone-acetaminophen (NORCO) 7.5-325 MG per tablet Take 1 tablet by mouth every 8 hours as needed for Pain for up to 30 days. JACQUELYN Quintanilla   fluticasone (FLONASE) 50 MCG/ACT nasal spray 2 sprays by Each Nostril route daily 1 Spray in each nostril  JACQUELYN Quintanilla       ASSESSMENT:  1. Type 2 diabetes mellitus without complication, without long-term current use of insulin ([facility]    2. Essential hypertension    3. Pain and swelling of right knee    4. Chronic neck and back pain    5.  Neuropathy of foot,

## 2020-03-11 ENCOUNTER — TELEPHONE (OUTPATIENT)
Dept: PRIMARY CARE CLINIC | Age: 55
End: 2020-03-11

## 2020-03-11 RX ORDER — HYDROCODONE BITARTRATE AND ACETAMINOPHEN 7.5; 325 MG/1; MG/1
1 TABLET ORAL EVERY 8 HOURS PRN
Qty: 90 TABLET | Refills: 0 | Status: SHIPPED | OUTPATIENT
Start: 2020-03-11 | End: 2020-04-07 | Stop reason: SDUPTHER

## 2020-03-11 NOTE — TELEPHONE ENCOUNTER
Pt called asking if you could send her a rx for something for constipation.  States she has been having issues for past 2 weeks

## 2020-03-12 ENCOUNTER — HOSPITAL ENCOUNTER (OUTPATIENT)
Dept: CT IMAGING | Facility: HOSPITAL | Age: 55
Discharge: HOME | End: 2020-03-12
Payer: MEDICAID

## 2020-03-12 PROCEDURE — 6360000004 HC RX CONTRAST MEDICATION: Performed by: NURSE PRACTITIONER

## 2020-03-12 PROCEDURE — 74177 CT ABD & PELVIS W/CONTRAST: CPT

## 2020-03-12 RX ORDER — DOCUSATE SODIUM 100 MG/1
100 CAPSULE, LIQUID FILLED ORAL 2 TIMES DAILY
Qty: 60 CAPSULE | Refills: 5 | Status: SHIPPED | OUTPATIENT
Start: 2020-03-12 | End: 2020-10-12

## 2020-03-12 RX ADMIN — IOPAMIDOL 100 ML: 755 INJECTION, SOLUTION INTRAVENOUS at 11:14

## 2020-03-20 ENCOUNTER — OFFICE VISIT (OUTPATIENT)
Dept: PRIMARY CARE CLINIC | Age: 55
End: 2020-03-20
Payer: MEDICAID

## 2020-03-20 VITALS
OXYGEN SATURATION: 94 % | DIASTOLIC BLOOD PRESSURE: 60 MMHG | BODY MASS INDEX: 47.38 KG/M2 | SYSTOLIC BLOOD PRESSURE: 104 MMHG | HEART RATE: 76 BPM | WEIGHT: 276 LBS

## 2020-03-20 LAB
CREATININE URINE POCT: 90.8
MICROALBUMIN/CREAT 24H UR: 6.3 MG/G{CREAT}
MICROALBUMIN/CREAT UR-RTO: 6.9

## 2020-03-20 PROCEDURE — 82044 UR ALBUMIN SEMIQUANTITATIVE: CPT | Performed by: NURSE PRACTITIONER

## 2020-03-20 PROCEDURE — 99213 OFFICE O/P EST LOW 20 MIN: CPT | Performed by: NURSE PRACTITIONER

## 2020-03-20 ASSESSMENT — ENCOUNTER SYMPTOMS
SHORTNESS OF BREATH: 0
NAUSEA: 0
ABDOMINAL PAIN: 0
SORE THROAT: 0
EYE PAIN: 0
COUGH: 0
VOMITING: 0

## 2020-03-20 NOTE — PROGRESS NOTES
Chief Complaint   Patient presents with    Nausea     Patient here today for a follow up with nausea. She had had a CT scan she would like to discuss the results. She states her sugar is staying low. Have you seen any other physician or provider since your last visit? no    Have you had any other diagnostic tests since your last visit? yes - CT SCAN    Have you changed or stopped any medications since your last visit including any over-the-counter medicines, vitamins, or herbal medicines? no     Are you taking all your prescribed medications? Yes  If NO, why? -  N/A    Microalbumin done today       REVIEW OF SYSTEMS:  Review of Systems   Constitutional: Negative for chills and fever. HENT: Negative for ear pain and sore throat. Eyes: Negative for pain and visual disturbance. Respiratory: Negative for cough and shortness of breath. Cardiovascular: Negative for chest pain, palpitations and leg swelling. Gastrointestinal: Negative for abdominal pain, nausea and vomiting. Genitourinary: Negative for dysuria and hematuria. Musculoskeletal: Negative for joint swelling. Skin: Negative for rash. Neurological: Negative for dizziness and weakness. Psychiatric/Behavioral: Negative for sleep disturbance.

## 2020-03-31 NOTE — PROGRESS NOTES
SUBJECTIVE:    Patient ID: Paul Jennings is a 62 y.o. female. Medical History Review  Past Medical, Family, and Social History reviewed and does contribute to the patient presenting condition    Health Maintenance Due   Topic Date Due    Hepatitis C screen  1965    HIV screen  04/17/1978    Hepatitis B vaccine (1 of 3 - Risk 3-dose series) 04/17/1982    Shingles Vaccine (1 of 2) 04/17/2013    Diabetic retinal exam  05/10/2017    Cervical cancer screen  08/21/2017    Diabetic foot exam  03/09/2018       HPI:   Chief Complaint   Patient presents with    Nausea     Patient here today for a follow up with nausea. She had had a CT scan she would like to discuss the results. She states her sugar is staying low. She has an appointment to see a surgeon for her hernias. When she strains to have a BM, her left abd bulges. This is the side where she previously had a colostomy. Patient's medications, allergies, past medical, surgical, social and family histories were reviewed and updated as appropriate. Review of Systems Reviewed and acurate. See MA note. OBJECTIVE:  /60   Pulse 76   Wt 276 lb (125.2 kg)   SpO2 94%   BMI 47.38 kg/m²    Physical Exam  Vitals signs reviewed. Constitutional:       General: She is not in acute distress. Appearance: She is well-developed. HENT:      Head: Normocephalic. Right Ear: Tympanic membrane normal.      Left Ear: Tympanic membrane normal.      Mouth/Throat:      Pharynx: No oropharyngeal exudate. Eyes:      General: Lids are normal.   Neck:      Musculoskeletal: Neck supple. Cardiovascular:      Rate and Rhythm: Normal rate and regular rhythm. Heart sounds: Normal heart sounds. Pulmonary:      Effort: Pulmonary effort is normal.      Breath sounds: Normal breath sounds. Abdominal:      General: Bowel sounds are normal. There is no distension. Palpations: Abdomen is soft. Tenderness:  There is no abdominal

## 2020-04-07 RX ORDER — HYDROCODONE BITARTRATE AND ACETAMINOPHEN 7.5; 325 MG/1; MG/1
1 TABLET ORAL EVERY 8 HOURS PRN
Qty: 90 TABLET | Refills: 0 | Status: SHIPPED | OUTPATIENT
Start: 2020-04-07 | End: 2020-05-05 | Stop reason: SDUPTHER

## 2020-05-05 RX ORDER — HYDROCODONE BITARTRATE AND ACETAMINOPHEN 7.5; 325 MG/1; MG/1
1 TABLET ORAL EVERY 8 HOURS PRN
Qty: 90 TABLET | Refills: 0 | Status: SHIPPED | OUTPATIENT
Start: 2020-05-05 | End: 2020-06-02 | Stop reason: SDUPTHER

## 2020-05-11 ENCOUNTER — OFFICE VISIT (OUTPATIENT)
Dept: PULMONOLOGY | Facility: CLINIC | Age: 55
End: 2020-05-11

## 2020-05-11 ENCOUNTER — HOSPITAL ENCOUNTER (OUTPATIENT)
Dept: GENERAL RADIOLOGY | Facility: HOSPITAL | Age: 55
Discharge: HOME | End: 2020-05-11
Admitting: INTERNAL MEDICINE

## 2020-05-11 VITALS
HEART RATE: 64 BPM | WEIGHT: 281 LBS | BODY MASS INDEX: 47.97 KG/M2 | RESPIRATION RATE: 18 BRPM | SYSTOLIC BLOOD PRESSURE: 120 MMHG | HEIGHT: 64 IN | DIASTOLIC BLOOD PRESSURE: 78 MMHG | OXYGEN SATURATION: 95 %

## 2020-05-11 DIAGNOSIS — G47.33 OBSTRUCTIVE SLEEP APNEA: ICD-10-CM

## 2020-05-11 DIAGNOSIS — G47.34 NOCTURNAL HYPOXIA: ICD-10-CM

## 2020-05-11 DIAGNOSIS — R06.02 SHORTNESS OF BREATH: Primary | ICD-10-CM

## 2020-05-11 DIAGNOSIS — J44.9 CHRONIC OBSTRUCTIVE PULMONARY DISEASE, UNSPECIFIED COPD TYPE (HCC): ICD-10-CM

## 2020-05-11 DIAGNOSIS — J30.89 OTHER ALLERGIC RHINITIS: ICD-10-CM

## 2020-05-11 DIAGNOSIS — R06.02 SHORTNESS OF BREATH: ICD-10-CM

## 2020-05-11 PROCEDURE — 99214 OFFICE O/P EST MOD 30 MIN: CPT | Performed by: INTERNAL MEDICINE

## 2020-05-11 PROCEDURE — 71046 X-RAY EXAM CHEST 2 VIEWS: CPT

## 2020-05-11 NOTE — PROGRESS NOTES
"Chief Complaint   Patient presents with   • Follow-up   • Shortness of Breath       Subjective   Chante Wetzel is a 55 y.o. female.     History of Present Illness   Patient was evaluated today for follow up of shortness of breath, ARIANNA, allergic rhinitis and COPD.     Patient says that her symptoms have been stable since the last clinic visit. she reports no recent exacerbations.     Patient is using medications, as prescribed. Exercise tolerance has also remained stable.     Quit smoking 16 years ago. She does smoke Vapor cigs every now and then.    Patient doesn't report any issues with the device. The patient describes no significant issues with her mask either. Patient says that the compliance with the use of the equipment is good.     Patient says that her symptoms of fatigue & daytime sleepiness have been helped greatly with the use of PAP, as prescribed.     She continues to be on Eliquis 2.5 mg BID for PEs.       The following portions of the patient's history were reviewed and updated as appropriate: allergies, current medications, past family history, past medical history, past social history and past surgical history.    Review of Systems   HENT: Positive for sinus pressure. Negative for sneezing and sore throat.    Respiratory: Positive for wheezing. Negative for cough and shortness of breath.        Objective   Visit Vitals  /78   Pulse 64   Resp 18   Ht 162.6 cm (64.02\")   Wt 127 kg (281 lb)   SpO2 95%   BMI 48.21 kg/m²       Physical Exam   Constitutional: She is oriented to person, place, and time. She appears well-developed and well-nourished.   HENT:   Head: Atraumatic.   Crowded oropharynx.  Dentures.    Neck: Neck supple.   Increased adipose tissue   Cardiovascular: Normal rate and regular rhythm.   Pulmonary/Chest: Effort normal. No respiratory distress.   Somewhat hyperresonant to percussion  Somewhat decreased A/E without wheezing noted.   Musculoskeletal: She exhibits no edema.   Gait " was intact.   Neurological: She is alert and oriented to person, place, and time.   Skin: Skin is warm.   Psychiatric: She has a normal mood and affect.   Vitals reviewed.        Assessment/Plan   Chante was seen today for follow-up and shortness of breath.    Diagnoses and all orders for this visit:    Shortness of breath  -     XR Chest PA & Lateral; Future    Chronic obstructive pulmonary disease, unspecified COPD type (CMS/HCC)  -     XR Chest PA & Lateral; Future    Other allergic rhinitis    Obstructive sleep apnea    Nocturnal hypoxia           Return in about 6 months (around 11/11/2020) for Recheck, Imaging, For [place].    DISCUSSION (if any):  Laboratory data was reviewed with her.   Lab Results   Component Value Date    AFPTM 166 07/14/2016     Lab Results   Component Value Date    [id number] MM 07/14/2016       No results found for: PHENOTYPE    Last PFTs showed moderate obstruction.  These were performed in 2019.    We have reviewed her pulmonary medications in great detail.    Any needed adjustments to her pulmonary medications, either for clinical or insurance coverage reasons, have been made and are reflected in the orders.    Compliance with medications stressed.     Side effects of prescribed medications discussed with the patient    CXR will be ordered today, since the last one was in 2017.    Continue treatment with CPAP at a pressure of 13, with a nasal pillows.    Patient seems to be compliant with PAP device, based on the available data and her account of improved symptoms.     The patient was once again reminded to continue using the PAP device regularly, every night for atleast 4 hours.    Patient was advised to continue her nasal spray, especially given improvement in symptoms overall.    The patient was advised to continue oxygen at night, with CPAP, since she has noticed improvement in some symptoms since using it as prescribed.    Continue Eliquis 2.5 mg PO BID.       Dictated utilizing  Simon dictation.    This document was electronically signed by Yuridia Mccormack MD on 05/11/20 at 13:00

## 2020-05-15 ENCOUNTER — OFFICE VISIT (OUTPATIENT)
Dept: SURGERY | Facility: CLINIC | Age: 55
End: 2020-05-15

## 2020-05-15 VITALS
HEIGHT: 64 IN | OXYGEN SATURATION: 92 % | TEMPERATURE: 97.1 F | DIASTOLIC BLOOD PRESSURE: 70 MMHG | SYSTOLIC BLOOD PRESSURE: 120 MMHG | BODY MASS INDEX: 48.45 KG/M2 | WEIGHT: 283.8 LBS | HEART RATE: 95 BPM

## 2020-05-15 DIAGNOSIS — E66.01 MORBID OBESITY (HCC): ICD-10-CM

## 2020-05-15 DIAGNOSIS — K43.2 INCISIONAL HERNIA, WITHOUT OBSTRUCTION OR GANGRENE: Primary | ICD-10-CM

## 2020-05-15 PROCEDURE — 99213 OFFICE O/P EST LOW 20 MIN: CPT | Performed by: SURGERY

## 2020-05-15 RX ORDER — DOCUSATE SODIUM 100 MG
CAPSULE ORAL
COMMUNITY
Start: 2020-05-09

## 2020-05-15 RX ORDER — LORATADINE 10 MG/1
TABLET ORAL DAILY
COMMUNITY
Start: 2020-05-09

## 2020-05-26 DIAGNOSIS — R91.8 ABNORMAL X-RAY OF LUNG: ICD-10-CM

## 2020-05-26 DIAGNOSIS — J44.9 CHRONIC OBSTRUCTIVE PULMONARY DISEASE, UNSPECIFIED COPD TYPE (HCC): ICD-10-CM

## 2020-05-26 DIAGNOSIS — R06.02 SHORTNESS OF BREATH: Primary | ICD-10-CM

## 2020-05-26 RX ORDER — TIZANIDINE 4 MG/1
TABLET ORAL
Qty: 90 TABLET | Refills: 5 | Status: SHIPPED | OUTPATIENT
Start: 2020-05-26 | End: 2020-11-12 | Stop reason: SDUPTHER

## 2020-05-29 ENCOUNTER — HOSPITAL ENCOUNTER (OUTPATIENT)
Dept: CT IMAGING | Facility: HOSPITAL | Age: 55
Discharge: HOME | End: 2020-05-29
Admitting: INTERNAL MEDICINE

## 2020-05-29 DIAGNOSIS — R91.8 ABNORMAL X-RAY OF LUNG: ICD-10-CM

## 2020-05-29 DIAGNOSIS — R06.02 SHORTNESS OF BREATH: ICD-10-CM

## 2020-05-29 DIAGNOSIS — J44.9 CHRONIC OBSTRUCTIVE PULMONARY DISEASE, UNSPECIFIED COPD TYPE (HCC): ICD-10-CM

## 2020-05-29 PROCEDURE — 71250 CT THORAX DX C-: CPT

## 2020-05-29 NOTE — PROGRESS NOTES
I called the patient regarding her CT result. I told her that CT did not show any unexpected or worrisome findings. Just showed some scarring.

## 2020-06-02 RX ORDER — HYDROCODONE BITARTRATE AND ACETAMINOPHEN 7.5; 325 MG/1; MG/1
1 TABLET ORAL EVERY 8 HOURS PRN
Qty: 90 TABLET | Refills: 0 | Status: SHIPPED | OUTPATIENT
Start: 2020-06-02 | End: 2020-06-30 | Stop reason: SDUPTHER

## 2020-06-08 RX ORDER — LISINOPRIL 5 MG/1
TABLET ORAL
Qty: 90 TABLET | Refills: 1 | Status: SHIPPED | OUTPATIENT
Start: 2020-06-08 | End: 2020-11-12 | Stop reason: SDUPTHER

## 2020-06-11 RX ORDER — MONTELUKAST SODIUM 10 MG/1
TABLET ORAL
Qty: 30 TABLET | Refills: 5 | Status: SHIPPED | OUTPATIENT
Start: 2020-06-11 | End: 2020-10-19 | Stop reason: SDUPTHER

## 2020-06-29 RX ORDER — IBUPROFEN 800 MG/1
800 TABLET ORAL 2 TIMES DAILY PRN
Qty: 60 TABLET | Refills: 1 | Status: SHIPPED | OUTPATIENT
Start: 2020-06-29 | End: 2020-07-31 | Stop reason: ALTCHOICE

## 2020-06-30 RX ORDER — HYDROCODONE BITARTRATE AND ACETAMINOPHEN 7.5; 325 MG/1; MG/1
1 TABLET ORAL EVERY 8 HOURS PRN
Qty: 90 TABLET | Refills: 0 | Status: SHIPPED | OUTPATIENT
Start: 2020-06-30 | End: 2020-07-31 | Stop reason: SDUPTHER

## 2020-07-31 ENCOUNTER — HOSPITAL ENCOUNTER (OUTPATIENT)
Facility: HOSPITAL | Age: 55
Discharge: HOME | End: 2020-07-31
Payer: MEDICAID

## 2020-07-31 ENCOUNTER — OFFICE VISIT (OUTPATIENT)
Dept: PRIMARY CARE CLINIC | Age: 55
End: 2020-07-31
Payer: MEDICAID

## 2020-07-31 VITALS
DIASTOLIC BLOOD PRESSURE: 80 MMHG | TEMPERATURE: 97.5 F | HEART RATE: 100 BPM | SYSTOLIC BLOOD PRESSURE: 138 MMHG | OXYGEN SATURATION: 90 % | WEIGHT: 293 LBS | BODY MASS INDEX: 50.02 KG/M2 | HEIGHT: 64 IN

## 2020-07-31 LAB
A/G RATIO: 1.4 (ref 0.8–2)
ALBUMIN SERPL-MCNC: 3.7 G/DL (ref 3.4–4.8)
ALP BLD-CCNC: 82 U/L (ref 25–100)
ALT SERPL-CCNC: 17 U/L (ref 4–36)
ANION GAP SERPL CALCULATED.3IONS-SCNC: 11 MMOL/L (ref 3–16)
AST SERPL-CCNC: 16 U/L (ref 8–33)
BASOPHILS ABSOLUTE: 0.1 K/UL (ref 0–0.1)
BASOPHILS RELATIVE PERCENT: 0.9 %
BILIRUB SERPL-MCNC: 0.7 MG/DL (ref 0.3–1.2)
BUN BLDV-MCNC: 29 MG/DL (ref 6–20)
CALCIUM SERPL-MCNC: 9.7 MG/DL (ref 8.5–10.5)
CHLORIDE BLD-SCNC: 100 MMOL/L (ref 98–107)
CHOLESTEROL, TOTAL: 122 MG/DL (ref 0–200)
CO2: 28 MMOL/L (ref 20–30)
CREAT SERPL-MCNC: 1.1 MG/DL (ref 0.4–1.2)
EOSINOPHILS ABSOLUTE: 0.2 K/UL (ref 0–0.4)
EOSINOPHILS RELATIVE PERCENT: 2.7 %
GFR AFRICAN AMERICAN: >59
GFR NON-AFRICAN AMERICAN: 51
GLOBULIN: 2.7 G/DL
GLUCOSE BLD-MCNC: 145 MG/DL (ref 74–106)
HBA1C MFR BLD: 6.3 %
HCT VFR BLD CALC: 40.5 % (ref 37–47)
HDLC SERPL-MCNC: 38 MG/DL (ref 40–60)
HEMOGLOBIN: 12.8 G/DL (ref 11.5–16.5)
IMMATURE GRANULOCYTES #: 0 K/UL
IMMATURE GRANULOCYTES %: 0.4 % (ref 0–5)
LDL CHOLESTEROL CALCULATED: 48 MG/DL
LYMPHOCYTES ABSOLUTE: 1.8 K/UL (ref 1.5–4)
LYMPHOCYTES RELATIVE PERCENT: 26 %
MCH RBC QN AUTO: 30.7 PG (ref 27–32)
MCHC RBC AUTO-ENTMCNC: 31.6 G/DL (ref 31–35)
MCV RBC AUTO: 97.1 FL (ref 80–100)
MONOCYTES ABSOLUTE: 0.6 K/UL (ref 0.2–0.8)
MONOCYTES RELATIVE PERCENT: 8.7 %
NEUTROPHILS ABSOLUTE: 4.3 K/UL (ref 2–7.5)
NEUTROPHILS RELATIVE PERCENT: 61.3 %
PDW BLD-RTO: 13.5 % (ref 11–16)
PLATELET # BLD: 212 K/UL (ref 150–400)
PMV BLD AUTO: 11.4 FL (ref 6–10)
POTASSIUM SERPL-SCNC: 4.5 MMOL/L (ref 3.4–5.1)
RBC # BLD: 4.17 M/UL (ref 3.8–5.8)
SODIUM BLD-SCNC: 139 MMOL/L (ref 136–145)
TOTAL PROTEIN: 6.4 G/DL (ref 6.4–8.3)
TRIGL SERPL-MCNC: 179 MG/DL (ref 0–249)
VLDLC SERPL CALC-MCNC: 36 MG/DL
WBC # BLD: 7 K/UL (ref 4–11)

## 2020-07-31 PROCEDURE — 99214 OFFICE O/P EST MOD 30 MIN: CPT | Performed by: NURSE PRACTITIONER

## 2020-07-31 PROCEDURE — 80061 LIPID PANEL: CPT

## 2020-07-31 PROCEDURE — 80053 COMPREHEN METABOLIC PANEL: CPT

## 2020-07-31 PROCEDURE — 85025 COMPLETE CBC W/AUTO DIFF WBC: CPT

## 2020-07-31 PROCEDURE — 83036 HEMOGLOBIN GLYCOSYLATED A1C: CPT

## 2020-07-31 RX ORDER — APIXABAN 2.5 MG/1
TABLET, FILM COATED ORAL
COMMUNITY
Start: 2020-07-27 | End: 2020-12-16 | Stop reason: SDUPTHER

## 2020-07-31 RX ORDER — HYDROCODONE BITARTRATE AND ACETAMINOPHEN 7.5; 325 MG/1; MG/1
1 TABLET ORAL EVERY 8 HOURS PRN
Qty: 90 TABLET | Refills: 0 | Status: SHIPPED | OUTPATIENT
Start: 2020-07-31 | End: 2020-08-27 | Stop reason: SDUPTHER

## 2020-07-31 ASSESSMENT — ENCOUNTER SYMPTOMS
EYE ITCHING: 0
VOMITING: 0
RHINORRHEA: 0
COUGH: 0
SORE THROAT: 0
DIARRHEA: 0
EYE DISCHARGE: 0
NAUSEA: 0
EYE REDNESS: 0
ABDOMINAL PAIN: 0
SHORTNESS OF BREATH: 0
CONSTIPATION: 0

## 2020-07-31 NOTE — LETTER
[facility]  [address] [place] [address]  Phone: [phone]  Fax: [phone]    JACQUELYN Kilpatrick        July 31, 2020     Patient: Debi Dean   YOB: 1965   Date of Visit: 7/31/2020       To Whom it May Concern:    Debi Dean was seen in my clinic on 7/31/2020. She is permanently disabled. If you have any questions or concerns, please don't hesitate to call.     Sincerely,         JACQUELYN Kilpatrick

## 2020-07-31 NOTE — PATIENT INSTRUCTIONS
dispose of used patches by folding them in half so that the sticky sides meet, and then flushing them down a toilet. They should not be placed in the household trash where children or pets can find them. · If you have any questions, ask your provider or pharmacist for more information. · Be sure to keep all appointments for provider visits or tests. We are committed to providing you with the best care possible. In order to help us achieve these goals please remember to bring all medications, herbal products, and over the counter supplements with you to each visit. If your provider has ordered testing for you, please be sure to follow up with our office if you have not received results within 7 days after the testing took place. *If you receive a survey after visiting one of our offices, please take time to share your experience concerning your physician office visit. These surveys are confidential and no health information about you is shared. We are eager to improve for you and we are counting on your feedback to help make that happen. ips to Help You Stop Smoking       Cigarette smoking is a preventable cause of death in the [place]. If you have thought about quitting but haven't been able to, here are some reasons why you should and some ways to do it. Here's Why   Quitting smoking now can decrease your risk of getting smoking-related illnesses like:   Heart disease   Stroke   Several types of cancer, including:   Lung   Mouth   Esophagus   Larynx   Bladder   Pancreas   Kidney   Chronic lung diseases:   Bronchitis   Emphysema   Asthma   Cataracts   Macular degeneration   Thyroid conditions   Hearing loss   Erectile dysfunction   Dementia   Osteoporosis   Here's How   Once you've decided to quit smoking, set your target quit date a few weeks away.  In the time leading up to your quit day, try some of these ideas offered by the [address] [place] [address] [facility] to help you successfully quit smoking. For the best results, work with your doctor. Together, you can test your lung function and compare the results to those of a nonsmoking person. The results can be given to you as your lung age. Finding out your lung age right after having the test done may help you to stop smoking. Your doctor can also discuss with you all of your options and refer you to smoking-cessation support groups. You may wish to use nicotine replacement (gum, patches, inhaler) or one of the prescription medications that have been shown to increase quit rates and prolong abstinence from smoking. But whatever you and your doctor decide on these matters, it will still be you who decides when an how to quit. Here are some techniques:   Switch Brands   Switch to a brand you find distasteful. Change to a brand that is low in tar and nicotine a couple of weeks before your target quit date. This will help change your smoking behavior. However, do not smoke more cigarettes, inhale them more often or more deeply, or place your fingertips over the holes in the filters. All of these actions will increase your nicotine intake, and the idea is to get your body used to functioning without nicotine. Cut Down the Number of Cigarettes You Smoke   Smoke only half of each cigarette. Each day, postpone the lighting of your first cigarette by one hour. Decide you'll only smoke during odd or even hours of the day. Decide beforehand how many cigarettes you'll smoke during the day. For each additional cigarette, give a dollar to your favorite shaylee. Change your eating habits to help you cut down. For example, drink milk, which many people consider incompatible with smoking. End meals or snacks with something that won't lead to a cigarette. Reach for a glass of juice instead of a cigarette for a \"pick-me-up. \"   Remember: Cutting down can help you quit, but it's not a substitute for quitting.  If you're down to about seven cigarettes a day, it's time to set your target quit date, and get ready to stick to it. Don't Smoke \"Automatically\"   Smoke only those cigarettes you really want. Catch yourself before you light up a cigarette out of pure habit. Don't empty your ashtrays. This will remind you of how many cigarettes you've smoked each day, and the sight and the smell of stale cigarettes butts will be very unpleasant. Make yourself aware of each cigarette by using the opposite hand or putting cigarettes in an unfamiliar location or a different pocket to break the automatic reach. If you light up many times during the day without even thinking about it, try to look in a mirror each time you put a match to your cigarette. You may decide you don't need it. Make Smoking Inconvenient   Stop buying cigarettes by the carton. Wait until one pack is empty before you buy another. Stop carrying cigarettes with you at home or at work. Make them difficult to get to. Make Smoking Unpleasant   Smoke only under circumstances that aren't especially pleasurable for you. If you like to smoke with others, smoke alone. Turn your chair to an empty corner and focus only on the cigarette you are smoking and all its many negative effects. Collect all your cigarette butts in one large glass container as a visual reminder of the filth made by smoking. Just Before Quitting   Practice going without cigarettes. Don't think of never smoking again. Think of quitting in terms of one day at a time . Tell yourself you won't smoke today, and then don't. Clean your clothes to rid them of the cigarette smell, which can linger a long time. On the Day You Quit   Throw away all your cigarettes and matches. Hide your lighters and ashtrays. Visit the dentist and have your teeth cleaned to get rid of tobacco stains. Notice how nice they look and resolve to keep them that way.    Make a list of things you'd like to buy for yourself or someone else. Estimate the cost in terms of packs of cigarettes, and put the money aside to buy these presents. Keep very busy on the big day. Go to the movies, exercise, take long walks, or go bike riding. Remind your family and friends that this is your quit date, and ask them to help you over the rough spots of the first couple of days and weeks. Buy yourself a treat or do something special to celebrate. Telephone and Internet Support   Telephone, web-, and computer-based programs can offer you the support that you need to quit and to stay smoke-free. You can find many programs online, like the [employer] Association's [place] from Smoking . Immediately After Quitting   Develop a clean, fresh, nonsmoking environment around yourselfat work and at home. Buy yourself flowersyou may be surprised how much you can enjoy their scent now. The first few days after you quit, spend as much free time as possible in places where smoking isn't allowed, such as [address] [place] [address], museums, theaters, department stores, and churches. Drink large quantities of water and fruit juice (but avoid sodas that contain caffeine). Try to avoid alcohol, coffee, and other beverages that you associate with cigarette smoking. Strike up conversation instead of a match for a cigarette. If you miss the sensation of having a cigarette in your hand, play with something elsea pencil, a paper clip, a marble. If you miss having something in your mouth, try toothpicks or a fake cigarette.

## 2020-07-31 NOTE — PROGRESS NOTES
Chief Complaint   Patient presents with    Diabetes     3 month follow up     Patient states her blood sugar has been doing well. Have you seen any other physician or provider since your last visit no    Have you had any other diagnostic tests since your last visit? no    Have you changed or stopped any medications since your last visit? no     Review of Systems   Constitutional: Negative for chills, fatigue and fever. HENT: Negative for congestion, ear pain, rhinorrhea and sore throat. Eyes: Negative for discharge, redness and itching. Respiratory: Negative for cough and shortness of breath. Cardiovascular: Negative for chest pain, palpitations and leg swelling. Gastrointestinal: Negative for abdominal pain, constipation, diarrhea, nausea and vomiting. Endocrine: Negative for cold intolerance and heat intolerance. Genitourinary: Negative for dysuria. Musculoskeletal: Negative for arthralgias and joint swelling. Skin: Negative for rash and wound. Neurological: Negative for weakness and headaches. Hematological: Negative for adenopathy. Psychiatric/Behavioral: Negative for dysphoric mood and sleep disturbance. The patient is not nervous/anxious.

## 2020-08-03 ENCOUNTER — TELEPHONE (OUTPATIENT)
Dept: PRIMARY CARE CLINIC | Age: 55
End: 2020-08-03

## 2020-08-03 NOTE — TELEPHONE ENCOUNTER
Called and left message for patient to return our call. Please inform if patient calls back. Patient's husbands results are also back. Please advise.

## 2020-08-05 RX ORDER — OMEPRAZOLE 40 MG/1
CAPSULE, DELAYED RELEASE ORAL
Qty: 30 CAPSULE | Refills: 5 | Status: SHIPPED | OUTPATIENT
Start: 2020-08-05 | End: 2021-02-04

## 2020-08-17 RX ORDER — BUMETANIDE 1 MG/1
TABLET ORAL
Qty: 30 TABLET | Refills: 5 | Status: SHIPPED | OUTPATIENT
Start: 2020-08-17 | End: 2021-02-25 | Stop reason: SDUPTHER

## 2020-08-24 RX ORDER — IBUPROFEN 800 MG/1
TABLET ORAL
Qty: 60 TABLET | Refills: 1 | OUTPATIENT
Start: 2020-08-24

## 2020-08-25 RX ORDER — APIXABAN 2.5 MG/1
TABLET, FILM COATED ORAL
Qty: 180 TABLET | Refills: 2 | Status: SHIPPED | OUTPATIENT
Start: 2020-08-25 | End: 2021-01-01

## 2020-08-25 NOTE — PROGRESS NOTES
SUBJECTIVE:    Patient ID: Jordan Ernst is a 60 y.o. female. Medical History Review  Past Medical, Family, and Social History reviewed and does contribute to the patient presenting condition    Health Maintenance Due   Topic Date Due    Hepatitis C screen  1965    HIV screen  04/17/1978    Hepatitis B vaccine (1 of 3 - Risk 3-dose series) 04/17/1982    Shingles Vaccine (1 of 2) 04/17/2013    Diabetic retinal exam  05/10/2017    Cervical cancer screen  08/21/2017    Diabetic foot exam  03/09/2018       HPI:   Chief Complaint   Patient presents with    Diabetes     3 month follow up   She has not been doing as well with her diet. She is doing well with norco for her back pain. Patient's medications, allergies, past medical, surgical, social and family histories were reviewed and updated as appropriate. Review of Systems Reviewed and acurate. See MA note. OBJECTIVE:  /80   Pulse 100   Temp 97.5 °F (36.4 °C)   Ht 5' 4\" (1.626 m)   Wt 299 lb (135.6 kg)   SpO2 90%   BMI 51.32 kg/m²    Physical Exam  Vitals signs reviewed. Constitutional:       General: She is not in acute distress. Appearance: She is well-developed. HENT:      Head: Normocephalic. Right Ear: Tympanic membrane normal.      Left Ear: Tympanic membrane normal.      Mouth/Throat:      Pharynx: No oropharyngeal exudate. Eyes:      General: Lids are normal.   Neck:      Musculoskeletal: Neck supple. Cardiovascular:      Rate and Rhythm: Normal rate and regular rhythm. Heart sounds: Normal heart sounds. Pulmonary:      Effort: Pulmonary effort is normal.      Breath sounds: Normal breath sounds. Abdominal:      General: Bowel sounds are normal. There is no distension. Palpations: Abdomen is soft. Tenderness: There is no abdominal tenderness. Lymphadenopathy:      Cervical: No cervical adenopathy. Skin:     General: Skin is warm and dry.    Neurological:      Mental Status: She is alert and oriented to person, place, and time. Results in Past 30 Days  Result Component Current Result Ref Range Previous Result Ref Range   Alb 3.7 (7/31/2020) 3.4 - 4.8 g/dL Not in Time Range    Albumin/Globulin Ratio 1.4 (7/31/2020) 0.8 - 2.0 Not in Time Range    Alkaline Phosphatase 82 (7/31/2020) 25 - 100 U/L Not in Time Range    ALT 17 (7/31/2020) 4 - 36 U/L Not in Time Range    AST 16 (7/31/2020) 8 - 33 U/L Not in Time Range    BUN 29 (H) (7/31/2020) 6 - 20 mg/dL Not in Time Range    Calcium 9.7 (7/31/2020) 8.5 - 10.5 mg/dL Not in Time Range    Chloride 100 (7/31/2020) 98 - 107 mmol/L Not in Time Range    CO2 28 (7/31/2020) 20 - 30 mmol/L Not in Time Range    CREATININE 1.1 (7/31/2020) 0.4 - 1.2 mg/dL Not in Time Range    GFR  >59 (7/31/2020) >59 Not in Time Range    GFR Non- 51 (L) (7/31/2020) >59 Not in Time Range    Globulin 2.7 (7/31/2020) g/dL Not in Time Range    Glucose 145 (H) (7/31/2020) 74 - 106 mg/dL Not in Time Range    Potassium 4.5 (7/31/2020) 3.4 - 5.1 mmol/L Not in Time Range    Sodium 139 (7/31/2020) 136 - 145 mmol/L Not in Time Range    Total Bilirubin 0.7 (7/31/2020) 0.3 - 1.2 mg/dL Not in Time Range    Total Protein 6.4 (7/31/2020) 6.4 - 8.3 g/dL Not in Time Range      Hemoglobin A1C (%)   Date Value   07/31/2020 6.3 (H)     Microscopic Examination (no units)   Date Value   04/13/2015 YES     Microalbumin, Random Urine (mg/dL)   Date Value   03/09/2018 <1.20     LDL Calculated (mg/dL)   Date Value   07/31/2020 48       Lab Results   Component Value Date    WBC 7.0 07/31/2020    NEUTROABS 4.3 07/31/2020    HGB 12.8 07/31/2020    HCT 40.5 07/31/2020    MCV 97.1 07/31/2020     07/31/2020     Lab Results   Component Value Date    TSH 3.93 04/13/2015       Prior to Visit Medications    Medication Sig Taking?  Authorizing Provider   ANILQULAYA 2.5 MG TABS tablet TAKE 1 TABLET BY MOUTH EVERY 12 HOURS Yes Historical Provider, MD HYDROcodone-acetaminophen (NORCO) 7.5-325 MG per tablet Take 1 tablet by mouth every 8 hours as needed for Pain for up to 30 days. Yes JACQUELYN Ware   lisinopril (PRINIVIL;ZESTRIL) 5 MG tablet TAKE 1 TABLET BY MOUTH ONCE DAILY Yes JACQUELYN Ware   tiZANidine (ZANAFLEX) 4 MG tablet TAKE 1 TABLET BY MOUTH EVERY 8 HOURS. Yes JACQUELYN Ware   Lancets (ONETOUCH DELICA PLUS [id number]) [address] [facility] TEST TWICE A DAY Yes JACQUELYN Ware   metFORMIN (GLUCOPHAGE-XR) 500 MG extended release tablet TAKE 1 TABLET BY MOUTH DAILY BEFORE BREAKFAST AND SUPPER Yes JACQUELYN Ware   loratadine (CLARITIN) 10 MG tablet Take 1 tablet by mouth daily as needed (allergies) Yes JACQUELYN Ware   triamcinolone (KENALOG) 0.1 % cream APPLY TO AFFECTED AREA TWICE A DAY Yes JACQUELYN Ware   ondansetron (ZOFRAN) 4 MG tablet Take 1 tablet by mouth every 8 hours as needed for Nausea or Vomiting Yes JACQUELYN Ware   Ferrous Sulfate (IRON) 325 (65 Fe) MG TABS TAKE 1 TABLET BY MOUTH ONCE DAILY WITH BREAKFAST Yes JACQUELYN Ware   diclofenac sodium (VOLTAREN) 1 % GEL Apply 2 g topically 4 times daily as needed for Pain Yes JACQUELYN Ware   atorvastatin (LIPITOR) 40 MG tablet TAKE 1 TABLET BY MOUTH ONCE DAILY Yes JACQUELYN Ware   glimepiride (AMARYL) 4 MG tablet take 1 tablet by mouth every morning BEFORE BREAKFAST Yes JACQUELYN Ware   Elastic Bandages & Supports (KNEE BRACE) MISC Right knee brace. Size XL. Elastic/compression. Yes JACQUELYN Gutierrez - CNP   blood glucose monitor strips Test 3 times a day & as needed for symptoms of irregular blood glucose.  Dx E11.9 Yes JACQUELYN Ware   ONE TOUCH ULTRA TEST strip TEST twice a day Yes JACQUELYN Ware   vitamin D (ERGOCALCIFEROL) 1.25 MG (83154 UT) CAPS capsule take 1 capsule by mouth every week Yes JACQUELYN Ware   montelukast (SINGULAIR) 10 MG tablet take 1 tablet by mouth at bedtime Yes JACQUELYN Ware   metoprolol COMPREHENSIVE METABOLIC PANEL    HEMOGLOBIN A1C    LIPID PANEL     Patient Instructions   · Keep a list of your medicines with you. List all of the prescription medicines, nonprescription medicines, supplements, natural remedies, and vitamins that you take. Tell your healthcare providers who treat you about all of the products you are taking. Your provider can provide you with a form to keep track of them. Just ask. · Follow the directions that come with your medicine, including information about food or alcohol. Make sure you know how and when to take your medicine. Do not take more or less than you are supposed to take. · Keep all medicines out of the reach of children. · Store medicines according to the directions on the label. · Monitor yourself. Learn to know how your body reacts to your new medicine and keep track of how it makes you feel before attempting (If your provider has allowed you to do so) to drive or go to work. · Seek emergency medical attention if you think you have used too much of this medicine. An overdose of any prescription medicine can be fatal. Overdose symptoms may include extreme drowsiness, muscle weakness, confusion, cold and clammy skin, pinpoint pupils, shallow breathing, slow heart rate, fainting, or coma. · Don't share prescription medicines with others, even when they seem to have the same symptoms. What may be good for you may be harmful to others. · If you are no longer taking a prescribed medication and you have pills left please take your pills out of their original containers. Mix crushed pills with an undesirable substance, such as cat litter or used coffee grounds. Put the mixture into a disposable container with a lid, such as an empty margarine tub, or into a sealable bag. Cover up or remove any of your personal information on the empty containers by covering it with black permanent marker or duct tape.   Place the sealed container with the mixture, and the empty drug containers, in the trash. · If you use a medication that is in the form of a patch, dispose of used patches by folding them in half so that the sticky sides meet, and then flushing them down a toilet. They should not be placed in the household trash where children or pets can find them. · If you have any questions, ask your provider or pharmacist for more information. · Be sure to keep all appointments for provider visits or tests. We are committed to providing you with the best care possible. In order to help us achieve these goals please remember to bring all medications, herbal products, and over the counter supplements with you to each visit. If your provider has ordered testing for you, please be sure to follow up with our office if you have not received results within 7 days after the testing took place. *If you receive a survey after visiting one of our offices, please take time to share your experience concerning your physician office visit. These surveys are confidential and no health information about you is shared. We are eager to improve for you and we are counting on your feedback to help make that happen. ips to Help You Stop Smoking       Cigarette smoking is a preventable cause of death in the [place]. If you have thought about quitting but haven't been able to, here are some reasons why you should and some ways to do it. Here's Why   Quitting smoking now can decrease your risk of getting smoking-related illnesses like:   Heart disease   Stroke   Several types of cancer, including:   Lung   Mouth   Esophagus   Larynx   Bladder   Pancreas   Kidney   Chronic lung diseases:   Bronchitis   Emphysema   Asthma   Cataracts   Macular degeneration   Thyroid conditions   Hearing loss   Erectile dysfunction   Dementia   Osteoporosis   Here's How   Once you've decided to quit smoking, set your target quit date a few weeks away.  In the time leading up to your quit day, try some of Remember: Cutting down can help you quit, but it's not a substitute for quitting. If you're down to about seven cigarettes a day, it's time to set your target quit date, and get ready to stick to it. Don't Smoke \"Automatically\"   Smoke only those cigarettes you really want. Catch yourself before you light up a cigarette out of pure habit. Don't empty your ashtrays. This will remind you of how many cigarettes you've smoked each day, and the sight and the smell of stale cigarettes butts will be very unpleasant. Make yourself aware of each cigarette by using the opposite hand or putting cigarettes in an unfamiliar location or a different pocket to break the automatic reach. If you light up many times during the day without even thinking about it, try to look in a mirror each time you put a match to your cigarette. You may decide you don't need it. Make Smoking Inconvenient   Stop buying cigarettes by the carton. Wait until one pack is empty before you buy another. Stop carrying cigarettes with you at home or at work. Make them difficult to get to. Make Smoking Unpleasant   Smoke only under circumstances that aren't especially pleasurable for you. If you like to smoke with others, smoke alone. Turn your chair to an empty corner and focus only on the cigarette you are smoking and all its many negative effects. Collect all your cigarette butts in one large glass container as a visual reminder of the filth made by smoking. Just Before Quitting   Practice going without cigarettes. Don't think of never smoking again. Think of quitting in terms of one day at a time . Tell yourself you won't smoke today, and then don't. Clean your clothes to rid them of the cigarette smell, which can linger a long time. On the Day You Quit   Throw away all your cigarettes and matches. Hide your lighters and ashtrays. Visit the dentist and have your teeth cleaned to get rid of tobacco stains.  Notice how nice they look and resolve to keep them that way. Make a list of things you'd like to buy for yourself or someone else. Estimate the cost in terms of packs of cigarettes, and put the money aside to buy these presents. Keep very busy on the big day. Go to the movies, exercise, take long walks, or go bike riding. Remind your family and friends that this is your quit date, and ask them to help you over the rough spots of the first couple of days and weeks. Buy yourself a treat or do something special to celebrate. Telephone and Internet Support   Telephone, web-, and computer-based programs can offer you the support that you need to quit and to stay smoke-free. You can find many programs online, like the American [employer] Association's Alpha from Smoking . Immediately After Quitting   Develop a clean, fresh, nonsmoking environment around yourselfat work and at home. Buy yourself flowersyou may be surprised how much you can enjoy their scent now. The first few days after you quit, spend as much free time as possible in places where smoking isn't allowed, such as [address] [place] [address], museums, theaters, department stores, and churches. Drink large quantities of water and fruit juice (but avoid sodas that contain caffeine). Try to avoid alcohol, coffee, and other beverages that you associate with cigarette smoking. Strike up conversation instead of a match for a cigarette. If you miss the sensation of having a cigarette in your hand, play with something elsea pencil, a paper clip, a marble. If you miss having something in your mouth, try toothpicks or a fake cigarette. Return in about 3 months (around 10/31/2020).

## 2020-08-27 RX ORDER — HYDROCODONE BITARTRATE AND ACETAMINOPHEN 7.5; 325 MG/1; MG/1
1 TABLET ORAL EVERY 8 HOURS PRN
Qty: 90 TABLET | Refills: 0 | Status: SHIPPED | OUTPATIENT
Start: 2020-08-27 | End: 2020-09-24 | Stop reason: SDUPTHER

## 2020-09-15 ENCOUNTER — TELEPHONE (OUTPATIENT)
Dept: PRIMARY CARE CLINIC | Age: 55
End: 2020-09-15

## 2020-09-24 RX ORDER — HYDROCODONE BITARTRATE AND ACETAMINOPHEN 7.5; 325 MG/1; MG/1
1 TABLET ORAL EVERY 8 HOURS PRN
Qty: 90 TABLET | Refills: 0 | Status: SHIPPED | OUTPATIENT
Start: 2020-09-24 | End: 2020-10-21 | Stop reason: SDUPTHER

## 2020-10-12 RX ORDER — DOCUSATE SODIUM 100 MG/1
CAPSULE, LIQUID FILLED ORAL
Qty: 60 CAPSULE | Refills: 5 | Status: SHIPPED | OUTPATIENT
Start: 2020-10-12 | End: 2021-01-01

## 2020-10-19 ENCOUNTER — OFFICE VISIT (OUTPATIENT)
Dept: PULMONOLOGY | Facility: CLINIC | Age: 55
End: 2020-10-19

## 2020-10-19 VITALS
SYSTOLIC BLOOD PRESSURE: 122 MMHG | OXYGEN SATURATION: 85 % | TEMPERATURE: 96.9 F | BODY MASS INDEX: 50.02 KG/M2 | WEIGHT: 293 LBS | HEIGHT: 64 IN | HEART RATE: 87 BPM | DIASTOLIC BLOOD PRESSURE: 80 MMHG | RESPIRATION RATE: 18 BRPM

## 2020-10-19 DIAGNOSIS — R09.02 HYPOXIA: ICD-10-CM

## 2020-10-19 DIAGNOSIS — E66.01 MORBID OBESITY, UNSPECIFIED OBESITY TYPE (HCC): ICD-10-CM

## 2020-10-19 DIAGNOSIS — J44.9 CHRONIC OBSTRUCTIVE PULMONARY DISEASE, UNSPECIFIED COPD TYPE (HCC): ICD-10-CM

## 2020-10-19 DIAGNOSIS — G47.33 OBSTRUCTIVE SLEEP APNEA: ICD-10-CM

## 2020-10-19 DIAGNOSIS — R06.02 SHORTNESS OF BREATH: Primary | ICD-10-CM

## 2020-10-19 PROCEDURE — 99214 OFFICE O/P EST MOD 30 MIN: CPT | Performed by: NURSE PRACTITIONER

## 2020-10-19 RX ORDER — OMEPRAZOLE 40 MG/1
CAPSULE, DELAYED RELEASE ORAL
COMMUNITY
Start: 2020-08-05

## 2020-10-19 RX ORDER — MONTELUKAST SODIUM 10 MG/1
10 TABLET ORAL NIGHTLY
Qty: 30 TABLET | Refills: 5 | Status: SHIPPED | OUTPATIENT
Start: 2020-10-19

## 2020-10-19 NOTE — PROGRESS NOTES
"Chief Complaint   Patient presents with   • Follow-up   • Shortness of Breath         Subjective   Chante Wetzel is a 55 y.o. female.     History of Present Illness   Patient comes today for follow up of shortness of breath and COPD.     Symptoms have been stable since the last clinic visit. Patient reports no recent exacerbations.     Patient is using medications, as prescribed.  She is using Anoro daily.  She does not use her rescue inhaler. She takes Singulair at night.    She uses Flonase on an as-needed basis and this seems to work well for her.    She uses oxygen on a continual basis.    Exercise tolerance has also remained stable.     Continues to smoke and electronic cigarette but she stopped smoking traditional cigarettes in 2004.  She has smoked an electronic cigarette for the last 3 years.    She uses CPAP at a pressure of 13 with oxygen bled into the machine.  She has no issues tolerating the machine and states she cannot sleep without it.  She has no issues getting supplies.    The following portions of the patient's history were reviewed and updated as appropriate: allergies, current medications, past family history, past medical history, past social history and past surgical history.      Review of Systems   Constitutional: Negative for chills and fever.   HENT: Negative for rhinorrhea, sinus pressure, sneezing and sore throat.    Respiratory: Positive for shortness of breath. Negative for cough, chest tightness and wheezing.    Psychiatric/Behavioral: Negative for sleep disturbance.       Objective   Visit Vitals  /80   Pulse 87   Temp 96.9 °F (36.1 °C)   Resp 18   Ht 162.6 cm (64\")   Wt (!) 137 kg (302 lb)   SpO2 (!) 85% Comment: resting on room air   BMI 51.84 kg/m²   SpO2: 96% on 2 lpm O2      Physical Exam  Vitals signs reviewed.   HENT:      Head: Atraumatic.      Mouth/Throat:      Mouth: Mucous membranes are moist.      Pharynx: Oropharynx is clear.      Comments: Crowded oropharynx. " Mild drainage noted.  Eyes:      Extraocular Movements: Extraocular movements intact.   Neck:      Musculoskeletal: Neck supple.   Cardiovascular:      Rate and Rhythm: Normal rate and regular rhythm.   Pulmonary:      Effort: Pulmonary effort is normal. No respiratory distress.      Comments: Somewhat decreased A/E without wheezing.   Abdominal:      Comments: Obese abdomen.   Musculoskeletal:      Comments: Gait normal.   Skin:     General: Skin is warm.   Neurological:      Mental Status: She is alert and oriented to person, place, and time.           Assessment/Plan   Diagnoses and all orders for this visit:    1. Shortness of breath (Primary)    2. Chronic obstructive pulmonary disease, unspecified COPD type (CMS/HCC)    3. Obstructive sleep apnea    4. Morbid obesity, unspecified obesity type (CMS/HCC)    5. Hypoxia    Other orders  -     montelukast (SINGULAIR) 10 MG tablet; Take 1 tablet by mouth Every Night.  Dispense: 30 tablet; Refill: 5  -     umeclidinium-vilanterol (Anoro Ellipta) 62.5-25 MCG/INH aerosol powder  inhaler; Inhale 1 puff Daily.  Dispense: 1 each; Refill: 5           Return in 6 months (on 4/19/2021) for CANCEL APPT IN NOVEMBER, Recheck, For Dr. Mccormack.    DISCUSSION (if any):  No change to the current medications has been made.  Overall her symptoms of COPD are stable with her current medications.  She should continue using Anoro and Singulair as directed.    She is to continue using Flonase on an as-needed basis since this has helped decrease nasal congestion and drainage.    She is needing oxygen recertification today and has brought the paperwork with her.  She continues to need oxygen 24/7 due to a resting oxygen saturation on room air of 85%.    Compliance with medications stressed.     Side effects of prescribed medications discussed with the patient.    Patient was strongly encouraged to quit smoking as soon as possible.  I have advised her to stop smoking electronic cigarettes.   She verbalizes understanding.    She does not meet LDCT screening guildelines. I reviewed the last CT and there was no acute processes.     She should continue using CPAP at the current pressure of 13.  She is compliant with CPAP use at greater than 95%.    Dictated utilizing Dragon dictation.    This document was electronically signed by JOHNNIE Corado October 19, 2020  13:44 EDT

## 2020-10-21 RX ORDER — HYDROCODONE BITARTRATE AND ACETAMINOPHEN 7.5; 325 MG/1; MG/1
1 TABLET ORAL EVERY 8 HOURS PRN
Qty: 90 TABLET | Refills: 0 | Status: SHIPPED | OUTPATIENT
Start: 2020-10-21 | End: 2020-11-18 | Stop reason: SDUPTHER

## 2020-11-10 NOTE — PATIENT INSTRUCTIONS
· Keep a list of your medicines with you. List all of the prescription medicines, nonprescription medicines, supplements, natural remedies, and vitamins that you take. Tell your healthcare providers who treat you about all of the products you are taking. Your provider can provide you with a form to keep track of them. Just ask. · Follow the directions that come with your medicine, including information about food or alcohol. Make sure you know how and when to take your medicine. Do not take more or less than you are supposed to take. · Keep all medicines out of the reach of children. · Store medicines according to the directions on the label. · Monitor yourself. Learn to know how your body reacts to your new medicine and keep track of how it makes you feel before attempting (If your provider has allowed you to do so) to drive or go to work. · Seek emergency medical attention if you think you have used too much of this medicine. An overdose of any prescription medicine can be fatal. Overdose symptoms may include extreme drowsiness, muscle weakness, confusion, cold and clammy skin, pinpoint pupils, shallow breathing, slow heart rate, fainting, or coma. · Don't share prescription medicines with others, even when they seem to have the same symptoms. What may be good for you may be harmful to others. · If you are no longer taking a prescribed medication and you have pills left please take your pills out of their original containers. Mix crushed pills with an undesirable substance, such as cat litter or used coffee grounds. Put the mixture into a disposable container with a lid, such as an empty margarine tub, or into a sealable bag. Cover up or remove any of your personal information on the empty containers by covering it with black permanent marker or duct tape. Place the sealed container with the mixture, and the empty drug containers, in the trash.    · If you use a medication that is in the form of a patch, dispose of used patches by folding them in half so that the sticky sides meet, and then flushing them down a toilet. They should not be placed in the household trash where children or pets can find them. · If you have any questions, ask your provider or pharmacist for more information. · Be sure to keep all appointments for provider visits or tests. We are committed to providing you with the best care possible. In order to help us achieve these goals please remember to bring all medications, herbal products, and over the counter supplements with you to each visit. If your provider has ordered testing for you, please be sure to follow up with our office if you have not received results within 7 days after the testing took place. *If you receive a survey after visiting one of our offices, please take time to share your experience concerning your physician office visit. These surveys are confidential and no health information about you is shared. We are eager to improve for you and we are counting on your feedback to help make that happen. Thank you for requesting your Continuity of Care Document (CCD) electronically. Please follow the instructions below to securely access your online medical record. [facility] allows you to send messages to your doctor, view your test results, renew your prescriptions, schedule appointments, and more. How Do I Access my CCD? In your Internet browser, go to [email or web address] Enter your user name and password   Click on My medical Record  --> Download Summary --> Enter Password --> Download --> Save or Open Document    Additional Information  If you have questions, please contact your physician practice where you receive care. Remember, [facility] is NOT to be used for urgent needs. For medical emergencies, dial 911.

## 2020-11-12 ENCOUNTER — OFFICE VISIT (OUTPATIENT)
Dept: PRIMARY CARE CLINIC | Age: 55
End: 2020-11-12
Payer: MEDICAID

## 2020-11-12 VITALS
SYSTOLIC BLOOD PRESSURE: 130 MMHG | DIASTOLIC BLOOD PRESSURE: 80 MMHG | WEIGHT: 293 LBS | HEART RATE: 81 BPM | OXYGEN SATURATION: 92 % | HEIGHT: 64 IN | BODY MASS INDEX: 50.02 KG/M2 | RESPIRATION RATE: 16 BRPM | TEMPERATURE: 96.7 F

## 2020-11-12 LAB — HBA1C MFR BLD: 7.5 %

## 2020-11-12 PROCEDURE — 90688 IIV4 VACCINE SPLT 0.5 ML IM: CPT | Performed by: NURSE PRACTITIONER

## 2020-11-12 PROCEDURE — 90471 IMMUNIZATION ADMIN: CPT | Performed by: NURSE PRACTITIONER

## 2020-11-12 PROCEDURE — 83036 HEMOGLOBIN GLYCOSYLATED A1C: CPT | Performed by: NURSE PRACTITIONER

## 2020-11-12 PROCEDURE — 99213 OFFICE O/P EST LOW 20 MIN: CPT | Performed by: NURSE PRACTITIONER

## 2020-11-12 PROCEDURE — 3051F HG A1C>EQUAL 7.0%<8.0%: CPT | Performed by: NURSE PRACTITIONER

## 2020-11-12 RX ORDER — MONTELUKAST SODIUM 10 MG/1
TABLET ORAL
Qty: 90 TABLET | Refills: 3 | Status: SHIPPED | OUTPATIENT
Start: 2020-11-12

## 2020-11-12 RX ORDER — LISINOPRIL 5 MG/1
TABLET ORAL
Qty: 90 TABLET | Refills: 3 | Status: SHIPPED | OUTPATIENT
Start: 2020-11-12 | End: 2021-02-25 | Stop reason: SDUPTHER

## 2020-11-12 RX ORDER — GLIMEPIRIDE 4 MG/1
TABLET ORAL
Qty: 90 TABLET | Refills: 3 | Status: SHIPPED | OUTPATIENT
Start: 2020-11-12 | End: 2021-02-25 | Stop reason: SDUPTHER

## 2020-11-12 RX ORDER — METFORMIN HYDROCHLORIDE 500 MG/1
TABLET, EXTENDED RELEASE ORAL
Qty: 180 TABLET | Refills: 3 | Status: SHIPPED | OUTPATIENT
Start: 2020-11-12 | End: 2021-01-01 | Stop reason: SDUPTHER

## 2020-11-12 RX ORDER — ATORVASTATIN CALCIUM 40 MG/1
TABLET, FILM COATED ORAL
Qty: 90 TABLET | Refills: 3 | Status: SHIPPED | OUTPATIENT
Start: 2020-11-12 | End: 2021-02-25 | Stop reason: SDUPTHER

## 2020-11-12 RX ORDER — TIZANIDINE 4 MG/1
TABLET ORAL
Qty: 270 TABLET | Refills: 3 | Status: SHIPPED | OUTPATIENT
Start: 2020-11-12 | End: 2021-01-01 | Stop reason: SDUPTHER

## 2020-11-12 ASSESSMENT — ENCOUNTER SYMPTOMS
DIARRHEA: 0
ABDOMINAL PAIN: 0
EYE ITCHING: 0
SHORTNESS OF BREATH: 0
COUGH: 0
CONSTIPATION: 0
VOMITING: 0
EYE REDNESS: 0
EYE DISCHARGE: 0
SORE THROAT: 0
NAUSEA: 0
RHINORRHEA: 0

## 2020-11-18 RX ORDER — HYDROCODONE BITARTRATE AND ACETAMINOPHEN 7.5; 325 MG/1; MG/1
1 TABLET ORAL EVERY 8 HOURS PRN
Qty: 90 TABLET | Refills: 0 | Status: SHIPPED | OUTPATIENT
Start: 2020-11-18 | End: 2020-12-16 | Stop reason: SDUPTHER

## 2020-11-23 NOTE — PROGRESS NOTES
SUBJECTIVE:    Patient ID: Charlean Claude is a 60 y.o. female. Medical History Review  Past Medical, Family, and Social History reviewed and does contribute to the patient presenting condition    Health Maintenance Due   Topic Date Due    Hepatitis C screen  1965    HIV screen  04/17/1978    Hepatitis B vaccine (1 of 3 - Risk 3-dose series) 04/17/1982    Shingles Vaccine (1 of 2) 04/17/2013    Diabetic retinal exam  05/10/2017    Cervical cancer screen  08/21/2017    Diabetic foot exam  03/09/2018       HPI:   Chief Complaint   Patient presents with    Diabetes    Hypertension   She has been feeling well. Her sugar is good. Patient's medications, allergies, past medical, surgical, social and family histories were reviewed and updated as appropriate. Review of Systems Reviewed and acurate. See MA note. OBJECTIVE:  /80 (Site: Right Upper Arm, Position: Sitting)   Pulse 81   Temp 96.7 °F (35.9 °C) (Temporal)   Resp 16   Ht 5' 4\" (1.626 m)   Wt (!) 302 lb (137 kg)   SpO2 92% Comment: room air  BMI 51.84 kg/m²    Physical Exam  Vitals signs reviewed. Constitutional:       General: She is not in acute distress. Appearance: She is well-developed. HENT:      Head: Normocephalic. Right Ear: Tympanic membrane normal.      Left Ear: Tympanic membrane normal.      Mouth/Throat:      Pharynx: No oropharyngeal exudate. Eyes:      General: Lids are normal.   Neck:      Musculoskeletal: Neck supple. Cardiovascular:      Rate and Rhythm: Normal rate and regular rhythm. Heart sounds: Normal heart sounds. Pulmonary:      Effort: Pulmonary effort is normal.      Breath sounds: Normal breath sounds. Abdominal:      General: Bowel sounds are normal. There is no distension. Palpations: Abdomen is soft. Tenderness: There is no abdominal tenderness. Lymphadenopathy:      Cervical: No cervical adenopathy. Skin:     General: Skin is warm and dry. TABLET BY MOUTH EVERY DAY Yes JACQUELYN Berman   omeprazole (PRILOSEC) 40 MG delayed release capsule TAKE 1 CAPSULE BY MOUTH EVERY MORNING BEFORE BREAKFAST Yes JACQUELYN Berman   ELIQUIS 2.5 MG TABS tablet TAKE 1 TABLET BY MOUTH EVERY 12 HOURS Yes Historical Provider, MD Cespedes ([facility] [place] [facility] [id number]) [address] [facility] TEST TWICE A DAY Yes JACQUELYN Berman   loratadine (CLARITIN) 10 MG tablet Take 1 tablet by mouth daily as needed (allergies) Yes JACQUELYN Berman   triamcinolone (KENALOG) 0.1 % cream APPLY TO AFFECTED AREA TWICE A DAY Yes JACQUELYN Berman   Ferrous Sulfate (IRON) 325 (65 Fe) MG TABS TAKE 1 TABLET BY MOUTH ONCE DAILY WITH BREAKFAST Yes JACQUELYN Berman   diclofenac sodium (VOLTAREN) 1 % GEL Apply 2 g topically 4 times daily as needed for Pain Yes JACQUELYN Berman   Elastic Bandages & Supports (KNEE BRACE) MISC Right knee brace. Size XL. Elastic/compression. Yes JACQUELYN Islas - CNP   blood glucose monitor strips Test 3 times a day & as needed for symptoms of irregular blood glucose.  Dx E11.9 Yes JACQUELYN Berman   ONE TOUCH ULTRA TEST strip TEST twice a day Yes JACQUELYN Berman   fluticasone [facility]) 50 MCG/ACT nasal spray 2 sprays by Each Nostril route daily 1 Spray in each nostril Yes JACQUELYN Berman   umeclidinium-vilanterol (ANORO ELLIPTA) 62.5-25 MCG/INH AEPB inhaler Inhale 1 puff into the lungs daily Yes JACQUELYN Berman   ipratropium-albuterol (DUONEB) 0.5-2.5 (3) MG/3ML SOLN nebulizer solution Inhale 3 mLs into the lungs every 4 hours as needed for Shortness of Breath Yes Leta Perry, DO   Blood Glucose Monitoring Suppl (ONE TOUCH ULTRA MINI) w/Device KIT use as directed Yes Leta Vicky, DO   albuterol sulfate HFA (VENTOLIN HFA) 108 (90 BASE) MCG/ACT inhaler Inhale 2 puffs into the lungs every 6 hours as needed for Wheezing Yes Patrick Dawkins, DO   Elastic Bandages & Supports (CARPAL TUNNEL WRIST STABILIZER) MISC Bilateral carpal tunnel splints for carpal tunnel syndrome bilaterally (Dx: G56.01, G56.02) Yes Leta Perry,    HYDROcodone-acetaminophen (NORCO) 7.5-325 MG per tablet Take 1 tablet by mouth every 8 hours as needed for Pain for up to 30 days. JACQUELYN Izquierdo       ASSESSMENT:  1. Need for influenza vaccination    2. Type 2 diabetes mellitus with diabetic neuropathy, without long-term current use of insulin ([facility] [address]    3. Chronic neck and back pain    4. Essential hypertension        PLAN:  Orders Placed This Encounter   Medications    atorvastatin (LIPITOR) 40 MG tablet     Sig: TAKE 1 TABLET BY MOUTH ONCE DAILY     Dispense:  90 tablet     Refill:  3    montelukast (SINGULAIR) 10 MG tablet     Sig: take 1 tablet by mouth at bedtime     Dispense:  90 tablet     Refill:  3    tiZANidine (ZANAFLEX) 4 MG tablet     Sig: TAKE 1 TABLET BY MOUTH EVERY 8 HOURS. Dispense:  270 tablet     Refill:  3    metFORMIN (GLUCOPHAGE-XR) 500 MG extended release tablet     Sig: TAKE 1 TABLET BY MOUTH DAILY BEFORE BREAKFAST AND SUPPER     Dispense:  180 tablet     Refill:  3    lisinopril (PRINIVIL;ZESTRIL) 5 MG tablet     Sig: TAKE 1 TABLET BY MOUTH ONCE DAILY     Dispense:  90 tablet     Refill:  3    glimepiride (AMARYL) 4 MG tablet     Sig: take 1 tablet by mouth every morning BEFORE BREAKFAST     Dispense:  90 tablet     Refill:  3    diclofenac sodium (VOLTAREN) 1 % GEL     Sig: Apply 4 g topically 4 times daily as needed for Pain     Dispense:  700 g     Refill:  3     Orders Placed This Encounter   Procedures    INFLUENZA, QUADV, 3 YRS AND OLDER, IM, MDV, 0.5ML (AFLURIA QUADV)    LIPID PANEL    COMPREHENSIVE METABOLIC PANEL    HEMOGLOBIN A1C    CBC WITH AUTO DIFFERENTIAL    POCT glycosylated hemoglobin (Hb A1C)     Patient Instructions   · Keep a list of your medicines with you. List all of the prescription medicines, nonprescription medicines, supplements, natural remedies, and vitamins that you take.  Tell your children or pets can find them. · If you have any questions, ask your provider or pharmacist for more information. · Be sure to keep all appointments for provider visits or tests. We are committed to providing you with the best care possible. In order to help us achieve these goals please remember to bring all medications, herbal products, and over the counter supplements with you to each visit. If your provider has ordered testing for you, please be sure to follow up with our office if you have not received results within 7 days after the testing took place. *If you receive a survey after visiting one of our offices, please take time to share your experience concerning your physician office visit. These surveys are confidential and no health information about you is shared. We are eager to improve for you and we are counting on your feedback to help make that happen. Thank you for requesting your Continuity of Care Document (CCD) electronically. Please follow the instructions below to securely access your online medical record. [facility] allows you to send messages to your doctor, view your test results, renew your prescriptions, schedule appointments, and more. How Do I Access my CCD? In your Internet browser, go to [email or web address] Enter your user name and password   Click on My medical Record  --> Download Summary --> Enter Password --> Download --> Save or Open Document    Additional Information  If you have questions, please contact your physician practice where you receive care. Remember, [employer] is NOT to be used for urgent needs. For medical emergencies, dial 911. Return in about 3 months (around 2/12/2021).

## 2020-12-02 RX ORDER — BLOOD SUGAR DIAGNOSTIC
STRIP MISCELLANEOUS
Qty: 600 STRIP | Refills: 1 | Status: SHIPPED | OUTPATIENT
Start: 2020-12-02

## 2020-12-10 NOTE — PROGRESS NOTES
Patient: Chante Wetzel    YOB: 1965    Date: 05/15/2020    Primary Care Provider: Soumya Muñoz APRN    Chief Complaint   Patient presents with   • Hernia       SUBJECTIVE:    History of present illness:  I saw the patient in the office today as a consultation for evaluation and treatment of multiple hernia.  Patient denies constipation, abdominal pain, nausea or vomiting.  She does feel a large bulge when she needs to have a bowel movement. Recent CT scan was performed when patient was in the ED for nausea. The CT did show Left para midline anterior abdominal wall hernia containing transverse colon without evidence of complication. Multiple additional fat-containing anterior abdominal wall hernias are present. Hepatic steatosis. The patient does have a history of colon resection with colostomy.  She is taking blood thinners due to a history of blood clots.  Patient has no evidence of bowel obstruction, significantly obese and has lost 40 pounds the knees look quite a bit more prior to intervention.    The following portions of the patient's history were reviewed and updated as appropriate: allergies, current medications, past family history, past medical history, past social history, past surgical history and problem list.    Review of Systems   Constitutional: Negative for chills, fever and unexpected weight change.   HENT: Negative for hearing loss, trouble swallowing and voice change.    Eyes: Negative for visual disturbance.   Respiratory: Positive for shortness of breath. Negative for apnea, cough, chest tightness and wheezing.    Cardiovascular: Negative for chest pain, palpitations and leg swelling.   Gastrointestinal: Negative for abdominal distention, abdominal pain, anal bleeding, blood in stool, constipation, diarrhea, nausea, rectal pain and vomiting.   Endocrine: Negative for cold intolerance and heat intolerance.   Genitourinary: Negative for difficulty urinating, dysuria and flank  "pain.   Musculoskeletal: Positive for back pain. Negative for gait problem.   Skin: Negative for color change, rash and wound.   Neurological: Negative for dizziness, syncope, speech difficulty, weakness, light-headedness, numbness and headaches.   Hematological: Negative for adenopathy. Does not bruise/bleed easily.   Psychiatric/Behavioral: Negative for confusion. The patient is not nervous/anxious.        History:  Past Medical History:   Diagnosis Date   • Congestive heart failure (CMS/HCC)    • COPD (chronic obstructive pulmonary disease) (CMS/HCC)    • Diabetes mellitus (CMS/HCC)    • H/O blood clots     on chronic anticoagulation therapy.    • Heart murmur    • History of EKG 01/29/2015    :  EKG:  Sinus rhythm.    • Hypertension    • Kidney failure    • Migraine    • Sleep apnea, obstructive        Past Surgical History:   Procedure Laterality Date   • COLON RESECTION WITH COLOSTOMY     • CYSTOSCOPY URETHRAL DIVERTICULUM REPAIR/EXCISION     • FOOT SURGERY     • REVISION / TAKEDOWN COLOSTOMY         Family History   Problem Relation Age of Onset   • Cancer Mother    • Heart attack Father    • Diabetes Other    • Heart disease Other        Social History     Tobacco Use   • Smoking status: Current Every Day Smoker     Types: Electronic Cigarette   • Smokeless tobacco: Never Used   • Tobacco comment: Quit years ago   Substance Use Topics   • Alcohol use: No   • Drug use: No       Allergies:  Allergies   Allergen Reactions   • Erythromycin Rash and Other (See Comments)     \"needles sticking in my chest\"       Medications:    Current Outpatient Medications:   •  apixaban (ELIQUIS) 2.5 MG tablet tablet, Take 1 tablet by mouth Every 12 (Twelve) Hours., Disp: 60 tablet, Rfl: 11  •  atorvastatin (LIPITOR) 40 MG tablet, take 1 tablet by mouth at bedtime, Disp: , Rfl:   •  bumetanide (BUMEX) 1 MG tablet, Take 1 mg by mouth Daily., Disp: , Rfl:   •  ferrous sulfate 325 (65 FE) MG EC tablet, TAKE 1 TABLET BY MOUTH ONCE " "DAILY WITH BREAKFAST, Disp: , Rfl:   •  fluticasone (FLONASE) 50 MCG/ACT nasal spray, 1 spray into the nostril(s) as directed by provider Daily., Disp: 1 bottle, Rfl: 5  •  glimepiride (AMARYL) 4 MG tablet, take 1 tablet by mouth every morning BEFORE BREAKFAST, Disp: , Rfl:   •  HYDROcodone-acetaminophen (NORCO) 7.5-325 MG per tablet, every 8 (eight) hours., Disp: , Rfl:   •  ipratropium-albuterol (DUO-NEB) 0.5-2.5 mg/3 ml nebulizer, Take 3 mL by nebulization 4 (Four) Times a Day As Needed for Wheezing or Shortness of Air., Disp: 360 mL, Rfl: 1  •  lisinopril (PRINIVIL,ZESTRIL) 5 MG tablet, take 1 tablet by mouth once daily, Disp: , Rfl:   •  loratadine (CLARITIN) 10 MG tablet, Take  by mouth Daily., Disp: , Rfl:   •  metFORMIN ER (GLUCOPHAGE-XR) 500 MG 24 hr tablet, 1 ac breakfast and supper, Disp: , Rfl:   •  metoprolol tartrate (LOPRESSOR) 25 MG tablet, Take 0.5 tablets by mouth 2 times daily, Disp: , Rfl:   •  montelukast (SINGULAIR) 10 MG tablet, Take 1 tablet by mouth Every Night., Disp: 30 tablet, Rfl: 5  •  pregabalin (LYRICA) 75 MG capsule, Take 75 mg by mouth 2 (Two) Times a Day., Disp: , Rfl:   •  STOOL SOFTENER 100 MG capsule, TK ONE C PO BID, Disp: , Rfl:   •  tiZANidine (ZANAFLEX) 4 MG tablet, take 1 tablet by mouth every 8 hours if needed for muscle spasm, Disp: , Rfl:   •  umeclidinium-vilanterol (ANORO ELLIPTA) 62.5-25 MCG/INH aerosol powder  inhaler, Inhale 1 puff Daily., Disp: 1 each, Rfl: 5  •  vitamin D (ERGOCALCIFEROL) 84966 units capsule capsule, take 1 capsule by mouth every week, Disp: , Rfl:   •  Blood Glucose Monitoring Suppl (ONE TOUCH ULTRA MINI) w/Device kit, use as directed, Disp: , Rfl: 0  •  Blood Glucose Monitoring Suppl (ONE TOUCH ULTRA MINI) w/Device kit, use as directed, Disp: , Rfl:     OBJECTIVE:    Vital Signs:   Vitals:    05/15/20 1453   BP: 120/70   Pulse: 95   Temp: 97.1 °F (36.2 °C)   TempSrc: Temporal   SpO2: 92%   Weight: 129 kg (283 lb 12.8 oz)   Height: 162.6 cm (64\") "       Physical Exam:   General Appearance:    Alert, cooperative, in no acute distress   Head:    Normocephalic, without obvious abnormality, atraumatic   Eyes:            Lids and lashes normal, conjunctivae and sclerae normal, no   icterus, no pallor, corneas clear, PERRLA   Ears:    Ears appear intact with no abnormalities noted   Throat:   No oral lesions, no thrush, oral mucosa moist   Neck:   No adenopathy, supple, trachea midline, no thyromegaly, no   carotid bruit, no JVD   Lungs:     Clear to auscultation,respirations regular, even and                  unlabored    Heart:    Regular rhythm and normal rate, normal S1 and S2, no            murmur   Abdomen:     no masses, no organomegaly, soft non-tender, non-distended, no guarding, there is evidence of a large incisional hernia above the umbilicus left of midline.  Reducible and nontender   Extremities:   Moves all extremities well, no edema, no cyanosis, no             redness   Pulses:   Pulses palpable and equal bilaterally   Skin:   No bleeding, bruising or rash   Lymph nodes:   No palpable adenopathy   Neurologic:   Cranial nerves 2 - 12 grossly intact, sensation intact        Results Review:   I reviewed the patient's new clinical results.    Review of Systems was reviewed and confirmed as accurate as documented by the MA.    ASSESSMENT/PLAN:    1. Incisional hernia, without obstruction or gangrene    2. Morbid obesity (CMS/HCC)      After long discussion with patient, reiterated that repairing hernia would have 100% recurrence due to her weight gain.  Patient has lost 40 pounds, encouraged to continue losing weight.  At this time the hernia is not causing obstruction, would not recommend repair at this time.  She will follow-up as needed.  She also was told to consider bariatric surgery as a means to increase rapid weight loss.     I discussed the patients findings and my recommendations with patient.    Electronically signed by Joseph Velásquez  MD  05/15/20             <-- Click to add NO pertinent Family History

## 2020-12-16 RX ORDER — APIXABAN 2.5 MG/1
TABLET, FILM COATED ORAL
Qty: 60 TABLET | Refills: 3 | Status: SHIPPED | OUTPATIENT
Start: 2020-12-16 | End: 2021-01-01

## 2020-12-16 RX ORDER — HYDROCODONE BITARTRATE AND ACETAMINOPHEN 7.5; 325 MG/1; MG/1
1 TABLET ORAL EVERY 8 HOURS PRN
Qty: 90 TABLET | Refills: 0 | Status: SHIPPED | OUTPATIENT
Start: 2020-12-16 | End: 2021-01-14 | Stop reason: SDUPTHER

## 2020-12-28 RX ORDER — LANCETS 33 GAUGE
EACH MISCELLANEOUS
Qty: 100 EACH | Refills: 5 | Status: SHIPPED | OUTPATIENT
Start: 2020-12-28

## 2021-01-01 ENCOUNTER — READMISSION MANAGEMENT (OUTPATIENT)
Dept: CALL CENTER | Facility: HOSPITAL | Age: 56
End: 2021-01-01

## 2021-01-01 ENCOUNTER — NURSE TRIAGE (OUTPATIENT)
Dept: OTHER | Facility: CLINIC | Age: 56
End: 2021-01-01

## 2021-01-01 ENCOUNTER — HOSPITAL ENCOUNTER (OUTPATIENT)
Dept: GENERAL RADIOLOGY | Facility: HOSPITAL | Age: 56
Discharge: HOME | End: 2021-07-28
Payer: MEDICAID

## 2021-01-01 ENCOUNTER — APPOINTMENT (OUTPATIENT)
Dept: GENERAL RADIOLOGY | Facility: HOSPITAL | Age: 56
End: 2021-01-01
Payer: MEDICAID

## 2021-01-01 ENCOUNTER — HOSPITAL ENCOUNTER (OUTPATIENT)
Facility: HOSPITAL | Age: 56
Discharge: HOME | End: 2021-07-28
Payer: MEDICAID

## 2021-01-01 ENCOUNTER — TELEPHONE (OUTPATIENT)
Dept: PRIMARY CARE CLINIC | Age: 56
End: 2021-01-01

## 2021-01-01 ENCOUNTER — OFFICE VISIT (OUTPATIENT)
Dept: PRIMARY CARE CLINIC | Age: 56
End: 2021-01-01
Payer: MEDICAID

## 2021-01-01 ENCOUNTER — LAB (OUTPATIENT)
Dept: LAB | Facility: HOSPITAL | Age: 56
End: 2021-01-01

## 2021-01-01 ENCOUNTER — DOCUMENTATION (OUTPATIENT)
Dept: PULMONOLOGY | Facility: CLINIC | Age: 56
End: 2021-01-01

## 2021-01-01 ENCOUNTER — APPOINTMENT (OUTPATIENT)
Dept: ULTRASOUND IMAGING | Facility: HOSPITAL | Age: 56
End: 2021-01-01

## 2021-01-01 ENCOUNTER — OFFICE VISIT (OUTPATIENT)
Dept: PULMONOLOGY | Facility: CLINIC | Age: 56
End: 2021-01-01

## 2021-01-01 ENCOUNTER — APPOINTMENT (OUTPATIENT)
Dept: GENERAL RADIOLOGY | Facility: HOSPITAL | Age: 56
End: 2021-01-01

## 2021-01-01 ENCOUNTER — HOSPITAL ENCOUNTER (OUTPATIENT)
Facility: HOSPITAL | Age: 56
Discharge: HOME | End: 2021-05-25
Payer: MEDICAID

## 2021-01-01 ENCOUNTER — HOSPITAL ENCOUNTER (EMERGENCY)
Facility: HOSPITAL | Age: 56
Discharge: ANOTHER HOSPITAL | End: 2021-09-25
Attending: EMERGENCY MEDICINE
Payer: MEDICAID

## 2021-01-01 ENCOUNTER — OFFICE VISIT (OUTPATIENT)
Dept: CARDIOLOGY | Facility: CLINIC | Age: 56
End: 2021-01-01

## 2021-01-01 ENCOUNTER — CARE COORDINATION (OUTPATIENT)
Dept: CARE COORDINATION | Age: 56
End: 2021-01-01

## 2021-01-01 ENCOUNTER — APPOINTMENT (OUTPATIENT)
Dept: CARDIOLOGY | Facility: HOSPITAL | Age: 56
End: 2021-01-01

## 2021-01-01 ENCOUNTER — HOSPITAL ENCOUNTER (OUTPATIENT)
Facility: HOSPITAL | Age: 56
Setting detail: OBSERVATION
Discharge: HOME | End: 2021-07-21
Attending: EMERGENCY MEDICINE | Admitting: INTERNAL MEDICINE

## 2021-01-01 ENCOUNTER — HOSPITAL ENCOUNTER (OUTPATIENT)
Dept: ULTRASOUND IMAGING | Facility: HOSPITAL | Age: 56
Discharge: HOME | End: 2021-08-26
Payer: MEDICAID

## 2021-01-01 ENCOUNTER — HOSPITAL ENCOUNTER (EMERGENCY)
Facility: HOSPITAL | Age: 56
Discharge: HOME | End: 2021-09-08
Attending: EMERGENCY MEDICINE | Admitting: EMERGENCY MEDICINE

## 2021-01-01 VITALS
BODY MASS INDEX: 50.02 KG/M2 | HEART RATE: 75 BPM | SYSTOLIC BLOOD PRESSURE: 122 MMHG | DIASTOLIC BLOOD PRESSURE: 74 MMHG | HEIGHT: 64 IN | WEIGHT: 293 LBS | OXYGEN SATURATION: 78 % | RESPIRATION RATE: 16 BRPM

## 2021-01-01 VITALS
HEART RATE: 81 BPM | OXYGEN SATURATION: 91 % | HEIGHT: 64 IN | SYSTOLIC BLOOD PRESSURE: 116 MMHG | TEMPERATURE: 96.5 F | WEIGHT: 293 LBS | BODY MASS INDEX: 50.02 KG/M2 | DIASTOLIC BLOOD PRESSURE: 54 MMHG | RESPIRATION RATE: 19 BRPM

## 2021-01-01 VITALS
OXYGEN SATURATION: 90 % | DIASTOLIC BLOOD PRESSURE: 67 MMHG | HEART RATE: 63 BPM | TEMPERATURE: 96.9 F | BODY MASS INDEX: 49.26 KG/M2 | SYSTOLIC BLOOD PRESSURE: 118 MMHG | WEIGHT: 287 LBS

## 2021-01-01 VITALS
DIASTOLIC BLOOD PRESSURE: 70 MMHG | WEIGHT: 293 LBS | BODY MASS INDEX: 50.02 KG/M2 | OXYGEN SATURATION: 90 % | SYSTOLIC BLOOD PRESSURE: 130 MMHG | HEIGHT: 64 IN | TEMPERATURE: 96.5 F | HEART RATE: 94 BPM

## 2021-01-01 VITALS
DIASTOLIC BLOOD PRESSURE: 49 MMHG | SYSTOLIC BLOOD PRESSURE: 80 MMHG | WEIGHT: 293 LBS | HEART RATE: 58 BPM | OXYGEN SATURATION: 94 % | TEMPERATURE: 98 F | BODY MASS INDEX: 50.02 KG/M2 | HEIGHT: 64 IN

## 2021-01-01 VITALS
OXYGEN SATURATION: 76 % | HEART RATE: 91 BPM | DIASTOLIC BLOOD PRESSURE: 74 MMHG | WEIGHT: 293 LBS | SYSTOLIC BLOOD PRESSURE: 122 MMHG | BODY MASS INDEX: 50.02 KG/M2 | HEIGHT: 64 IN

## 2021-01-01 VITALS
TEMPERATURE: 97.3 F | WEIGHT: 293 LBS | SYSTOLIC BLOOD PRESSURE: 125 MMHG | RESPIRATION RATE: 18 BRPM | DIASTOLIC BLOOD PRESSURE: 70 MMHG | OXYGEN SATURATION: 91 % | HEART RATE: 101 BPM | BODY MASS INDEX: 50.02 KG/M2 | HEIGHT: 64 IN

## 2021-01-01 VITALS
BODY MASS INDEX: 50.02 KG/M2 | WEIGHT: 293 LBS | HEIGHT: 64 IN | DIASTOLIC BLOOD PRESSURE: 66 MMHG | RESPIRATION RATE: 18 BRPM | SYSTOLIC BLOOD PRESSURE: 105 MMHG | HEART RATE: 51 BPM | OXYGEN SATURATION: 92 % | TEMPERATURE: 98.2 F

## 2021-01-01 VITALS
OXYGEN SATURATION: 91 % | DIASTOLIC BLOOD PRESSURE: 80 MMHG | SYSTOLIC BLOOD PRESSURE: 120 MMHG | WEIGHT: 284 LBS | BODY MASS INDEX: 48.49 KG/M2 | HEIGHT: 64 IN | HEART RATE: 91 BPM

## 2021-01-01 VITALS
OXYGEN SATURATION: 91 % | HEIGHT: 64 IN | HEART RATE: 69 BPM | DIASTOLIC BLOOD PRESSURE: 60 MMHG | SYSTOLIC BLOOD PRESSURE: 110 MMHG | BODY MASS INDEX: 50.02 KG/M2 | WEIGHT: 293 LBS | TEMPERATURE: 96.7 F

## 2021-01-01 DIAGNOSIS — E66.01 MORBID OBESITY, UNSPECIFIED OBESITY TYPE (HCC): ICD-10-CM

## 2021-01-01 DIAGNOSIS — M54.9 CHRONIC BACK PAIN, UNSPECIFIED BACK LOCATION, UNSPECIFIED BACK PAIN LATERALITY: ICD-10-CM

## 2021-01-01 DIAGNOSIS — G89.29 CHRONIC NECK AND BACK PAIN: ICD-10-CM

## 2021-01-01 DIAGNOSIS — R06.02 SHORTNESS OF BREATH: Primary | ICD-10-CM

## 2021-01-01 DIAGNOSIS — Z87.01 HISTORY OF PNEUMONIA: ICD-10-CM

## 2021-01-01 DIAGNOSIS — G89.29 CHRONIC BACK PAIN, UNSPECIFIED BACK LOCATION, UNSPECIFIED BACK PAIN LATERALITY: ICD-10-CM

## 2021-01-01 DIAGNOSIS — N28.9 ABNORMAL KIDNEY FUNCTION: Primary | ICD-10-CM

## 2021-01-01 DIAGNOSIS — M79.89 RIGHT LEG SWELLING: ICD-10-CM

## 2021-01-01 DIAGNOSIS — G47.33 OBSTRUCTIVE SLEEP APNEA: ICD-10-CM

## 2021-01-01 DIAGNOSIS — J96.21 ACUTE ON CHRONIC RESPIRATORY FAILURE WITH HYPOXIA AND HYPERCAPNIA (HCC): Primary | ICD-10-CM

## 2021-01-01 DIAGNOSIS — Z78.9 ELECTRONIC CIGARETTE USE: ICD-10-CM

## 2021-01-01 DIAGNOSIS — J43.1 PANLOBULAR EMPHYSEMA (HCC): ICD-10-CM

## 2021-01-01 DIAGNOSIS — E11.65 TYPE 2 DIABETES MELLITUS WITH HYPERGLYCEMIA, WITHOUT LONG-TERM CURRENT USE OF INSULIN (HCC): ICD-10-CM

## 2021-01-01 DIAGNOSIS — I10 BENIGN HYPERTENSION: Primary | ICD-10-CM

## 2021-01-01 DIAGNOSIS — I50.9 ACUTE ON CHRONIC CONGESTIVE HEART FAILURE, UNSPECIFIED HEART FAILURE TYPE (HCC): Primary | ICD-10-CM

## 2021-01-01 DIAGNOSIS — N28.9 ABNORMAL KIDNEY FUNCTION: ICD-10-CM

## 2021-01-01 DIAGNOSIS — I50.9 CONGESTIVE HEART FAILURE, UNSPECIFIED HF CHRONICITY, UNSPECIFIED HEART FAILURE TYPE (HCC): Primary | ICD-10-CM

## 2021-01-01 DIAGNOSIS — N18.9 ACUTE RENAL FAILURE SUPERIMPOSED ON CHRONIC KIDNEY DISEASE, UNSPECIFIED CKD STAGE, UNSPECIFIED ACUTE RENAL FAILURE TYPE (HCC): Primary | ICD-10-CM

## 2021-01-01 DIAGNOSIS — M19.90 ARTHRITIS: ICD-10-CM

## 2021-01-01 DIAGNOSIS — I10 ESSENTIAL HYPERTENSION: ICD-10-CM

## 2021-01-01 DIAGNOSIS — I27.20 PULMONARY HYPERTENSION (HCC): ICD-10-CM

## 2021-01-01 DIAGNOSIS — I50.9 ACUTE ON CHRONIC CONGESTIVE HEART FAILURE, UNSPECIFIED HEART FAILURE TYPE (HCC): ICD-10-CM

## 2021-01-01 DIAGNOSIS — Z09 HOSPITAL DISCHARGE FOLLOW-UP: ICD-10-CM

## 2021-01-01 DIAGNOSIS — R60.1 ANASARCA: ICD-10-CM

## 2021-01-01 DIAGNOSIS — N28.9 RENAL INSUFFICIENCY: Primary | ICD-10-CM

## 2021-01-01 DIAGNOSIS — L30.9 DERMATITIS, UNSPECIFIED: ICD-10-CM

## 2021-01-01 DIAGNOSIS — B34.2 CORONAVIRUS INFECTION: ICD-10-CM

## 2021-01-01 DIAGNOSIS — M79.89 RIGHT LEG SWELLING: Primary | ICD-10-CM

## 2021-01-01 DIAGNOSIS — J44.1 COPD EXACERBATION (HCC): ICD-10-CM

## 2021-01-01 DIAGNOSIS — I50.33 ACUTE ON CHRONIC DIASTOLIC CHF (CONGESTIVE HEART FAILURE) (HCC): ICD-10-CM

## 2021-01-01 DIAGNOSIS — E66.01 MORBID OBESITY (HCC): ICD-10-CM

## 2021-01-01 DIAGNOSIS — J44.9 CHRONIC OBSTRUCTIVE PULMONARY DISEASE, UNSPECIFIED COPD TYPE (HCC): ICD-10-CM

## 2021-01-01 DIAGNOSIS — R53.1 WEAKNESS: ICD-10-CM

## 2021-01-01 DIAGNOSIS — I26.99 OTHER PULMONARY EMBOLISM WITHOUT ACUTE COR PULMONALE, UNSPECIFIED CHRONICITY (HCC): ICD-10-CM

## 2021-01-01 DIAGNOSIS — G57.90 NEUROPATHY OF FOOT, UNSPECIFIED LATERALITY: ICD-10-CM

## 2021-01-01 DIAGNOSIS — E11.65 TYPE 2 DIABETES MELLITUS WITH HYPERGLYCEMIA, WITHOUT LONG-TERM CURRENT USE OF INSULIN (HCC): Primary | ICD-10-CM

## 2021-01-01 DIAGNOSIS — R09.02 HYPOXIA: ICD-10-CM

## 2021-01-01 DIAGNOSIS — E87.79 VOLUME OVERLOAD STATE OF HEART: ICD-10-CM

## 2021-01-01 DIAGNOSIS — E11.40 TYPE 2 DIABETES MELLITUS WITH DIABETIC NEUROPATHY, WITHOUT LONG-TERM CURRENT USE OF INSULIN (HCC): ICD-10-CM

## 2021-01-01 DIAGNOSIS — I27.81 COR PULMONALE, CHRONIC (HCC): ICD-10-CM

## 2021-01-01 DIAGNOSIS — M54.9 CHRONIC NECK AND BACK PAIN: ICD-10-CM

## 2021-01-01 DIAGNOSIS — Z09 HOSPITAL DISCHARGE FOLLOW-UP: Primary | ICD-10-CM

## 2021-01-01 DIAGNOSIS — N17.9 ACUTE RENAL FAILURE SUPERIMPOSED ON CHRONIC KIDNEY DISEASE, UNSPECIFIED CKD STAGE, UNSPECIFIED ACUTE RENAL FAILURE TYPE (HCC): Primary | ICD-10-CM

## 2021-01-01 DIAGNOSIS — M54.2 CHRONIC NECK AND BACK PAIN: ICD-10-CM

## 2021-01-01 DIAGNOSIS — J96.22 ACUTE ON CHRONIC RESPIRATORY FAILURE WITH HYPOXIA AND HYPERCAPNIA (HCC): Primary | ICD-10-CM

## 2021-01-01 DIAGNOSIS — J43.2 CENTRILOBULAR EMPHYSEMA (HCC): ICD-10-CM

## 2021-01-01 DIAGNOSIS — R60.0 LEG EDEMA: ICD-10-CM

## 2021-01-01 DIAGNOSIS — N28.9 RENAL INSUFFICIENCY: ICD-10-CM

## 2021-01-01 DIAGNOSIS — G47.33 OSA (OBSTRUCTIVE SLEEP APNEA): ICD-10-CM

## 2021-01-01 DIAGNOSIS — E87.5 HYPERKALEMIA: ICD-10-CM

## 2021-01-01 LAB
A-A DO2: 223.6 MMHG
A/G RATIO: 1 (ref 0.8–2)
A/G RATIO: 1 (ref 0.8–2)
A/G RATIO: 1.5 (ref 0.8–2)
ALBUMIN SERPL-MCNC: 3.3 G/DL (ref 3.4–4.8)
ALBUMIN SERPL-MCNC: 3.6 G/DL (ref 3.4–4.8)
ALBUMIN SERPL-MCNC: 3.6 G/DL (ref 3.5–5.2)
ALBUMIN SERPL-MCNC: 3.7 G/DL (ref 3.5–5.2)
ALBUMIN SERPL-MCNC: 3.7 G/DL (ref 3.5–5.2)
ALBUMIN SERPL-MCNC: 3.9 G/DL (ref 3.5–5.2)
ALBUMIN SERPL-MCNC: 4 G/DL (ref 3.5–5.2)
ALBUMIN SERPL-MCNC: 4.3 G/DL (ref 3.4–4.8)
ALBUMIN/GLOB SERPL: 0.9 G/DL
ALBUMIN/GLOB SERPL: 1 G/DL
ALBUMIN/GLOB SERPL: 1 G/DL
ALBUMIN/GLOB SERPL: 1.1 G/DL
ALBUMIN/GLOB SERPL: 1.2 G/DL
ALP BLD-CCNC: 108 U/L (ref 25–100)
ALP BLD-CCNC: 113 U/L (ref 25–100)
ALP BLD-CCNC: 91 U/L (ref 25–100)
ALP SERPL-CCNC: 100 U/L (ref 39–117)
ALP SERPL-CCNC: 101 U/L (ref 39–117)
ALP SERPL-CCNC: 102 U/L (ref 39–117)
ALP SERPL-CCNC: 107 U/L (ref 39–117)
ALP SERPL-CCNC: 114 U/L (ref 39–117)
ALT SERPL W P-5'-P-CCNC: 15 U/L (ref 1–33)
ALT SERPL W P-5'-P-CCNC: 24 U/L (ref 1–33)
ALT SERPL W P-5'-P-CCNC: 24 U/L (ref 1–33)
ALT SERPL W P-5'-P-CCNC: 26 U/L (ref 1–33)
ALT SERPL W P-5'-P-CCNC: 27 U/L (ref 1–33)
ALT SERPL-CCNC: 11 U/L (ref 4–36)
ALT SERPL-CCNC: 13 U/L (ref 4–36)
ALT SERPL-CCNC: 30 U/L (ref 4–36)
ANION GAP SERPL CALCULATED.3IONS-SCNC: 10 MMOL/L (ref 3–16)
ANION GAP SERPL CALCULATED.3IONS-SCNC: 10 MMOL/L (ref 3–16)
ANION GAP SERPL CALCULATED.3IONS-SCNC: 10.7 MMOL/L (ref 5–15)
ANION GAP SERPL CALCULATED.3IONS-SCNC: 11 MMOL/L (ref 3–16)
ANION GAP SERPL CALCULATED.3IONS-SCNC: 12 MMOL/L (ref 3–16)
ANION GAP SERPL CALCULATED.3IONS-SCNC: 12 MMOL/L (ref 3–16)
ANION GAP SERPL CALCULATED.3IONS-SCNC: 13 MMOL/L (ref 5–15)
ANION GAP SERPL CALCULATED.3IONS-SCNC: 13.2 MMOL/L (ref 5–15)
ANION GAP SERPL CALCULATED.3IONS-SCNC: 13.3 MMOL/L (ref 5–15)
ANION GAP SERPL CALCULATED.3IONS-SCNC: 13.3 MMOL/L (ref 5–15)
ANION GAP SERPL CALCULATED.3IONS-SCNC: 9.3 MMOL/L (ref 5–15)
ANION GAP SERPL CALCULATED.3IONS-SCNC: 9.4 MMOL/L (ref 5–15)
ARTERIAL PATENCY WRIST A: NEGATIVE
AST SERPL-CCNC: 14 U/L (ref 8–33)
AST SERPL-CCNC: 15 U/L (ref 1–32)
AST SERPL-CCNC: 17 U/L (ref 1–32)
AST SERPL-CCNC: 22 U/L (ref 1–32)
AST SERPL-CCNC: 22 U/L (ref 8–33)
AST SERPL-CCNC: 24 U/L (ref 1–32)
AST SERPL-CCNC: 24 U/L (ref 8–33)
AST SERPL-CCNC: 29 U/L (ref 1–32)
ATMOSPHERIC PRESS: 737 MMHG
B PARAPERT DNA SPEC QL NAA+PROBE: NOT DETECTED
B PERT DNA SPEC QL NAA+PROBE: NOT DETECTED
BACTERIA SPEC AEROBE CULT: NORMAL
BACTERIA SPEC AEROBE CULT: NORMAL
BACTERIA SPEC RESP CULT: NORMAL
BASE EXCESS BLDA CALC-SCNC: 3.1 MMOL/L (ref 0–2)
BASOPHILS # BLD AUTO: 0.01 10*3/MM3 (ref 0–0.2)
BASOPHILS # BLD AUTO: 0.02 10*3/MM3 (ref 0–0.2)
BASOPHILS # BLD AUTO: 0.03 10*3/MM3 (ref 0–0.2)
BASOPHILS # BLD AUTO: 0.03 10*3/MM3 (ref 0–0.2)
BASOPHILS # BLD AUTO: 0.07 10*3/MM3 (ref 0–0.2)
BASOPHILS ABSOLUTE: 0 K/UL (ref 0–0.1)
BASOPHILS ABSOLUTE: 0 K/UL (ref 0–0.1)
BASOPHILS NFR BLD AUTO: 0.1 % (ref 0–1.5)
BASOPHILS NFR BLD AUTO: 0.2 % (ref 0–1.5)
BASOPHILS NFR BLD AUTO: 0.2 % (ref 0–1.5)
BASOPHILS NFR BLD AUTO: 0.3 % (ref 0–1.5)
BASOPHILS NFR BLD AUTO: 0.9 % (ref 0–1.5)
BASOPHILS RELATIVE PERCENT: 0.1 %
BASOPHILS RELATIVE PERCENT: 0.5 %
BDY SITE: ABNORMAL
BH CV ECHO MEAS - % IVS THICK: 54 %
BH CV ECHO MEAS - % LVPW THICK: 88.5 %
BH CV ECHO MEAS - AO MAX PG (FULL): 9 MMHG
BH CV ECHO MEAS - AO MAX PG: 11 MMHG
BH CV ECHO MEAS - AO MEAN PG (FULL): 4 MMHG
BH CV ECHO MEAS - AO MEAN PG: 5 MMHG
BH CV ECHO MEAS - AO V2 MAX: 169 CM/SEC
BH CV ECHO MEAS - AO V2 MEAN: 105 CM/SEC
BH CV ECHO MEAS - AO V2 VTI: 36.2 CM
BH CV ECHO MEAS - AVA(I,A): 1.2 CM^2
BH CV ECHO MEAS - AVA(I,D): 1.2 CM^2
BH CV ECHO MEAS - AVA(V,A): 1.2 CM^2
BH CV ECHO MEAS - AVA(V,D): 1.2 CM^2
BH CV ECHO MEAS - BSA(HAYCOCK): 2.6 M^2
BH CV ECHO MEAS - BSA: 2.3 M^2
BH CV ECHO MEAS - BZI_BMI: 51.5 KILOGRAMS/M^2
BH CV ECHO MEAS - BZI_METRIC_HEIGHT: 162.6 CM
BH CV ECHO MEAS - BZI_METRIC_WEIGHT: 136.1 KG
BH CV ECHO MEAS - EDV(CUBED): 91.1 ML
BH CV ECHO MEAS - EDV(MOD-SP4): 50.8 ML
BH CV ECHO MEAS - EDV(TEICH): 92.4 ML
BH CV ECHO MEAS - EF(CUBED): 80.9 %
BH CV ECHO MEAS - EF(MOD-SP4): 78 %
BH CV ECHO MEAS - EF(TEICH): 73.6 %
BH CV ECHO MEAS - ESV(CUBED): 17.4 ML
BH CV ECHO MEAS - ESV(MOD-SP4): 11.2 ML
BH CV ECHO MEAS - ESV(TEICH): 24.4 ML
BH CV ECHO MEAS - FS: 42.4 %
BH CV ECHO MEAS - IVS/LVPW: 1
BH CV ECHO MEAS - IVSD: 0.87 CM
BH CV ECHO MEAS - IVSS: 1.3 CM
BH CV ECHO MEAS - LA DIMENSION: 4.1 CM
BH CV ECHO MEAS - LAD MAJOR: 5.6 CM
BH CV ECHO MEAS - LAT PEAK E' VEL: 6.6 CM/SEC
BH CV ECHO MEAS - LATERAL E/E' RATIO: 13.4
BH CV ECHO MEAS - LV DIASTOLIC VOL/BSA (35-75): 21.9 ML/M^2
BH CV ECHO MEAS - LV MASS(C)D: 126.9 GRAMS
BH CV ECHO MEAS - LV MASS(C)DI: 54.6 GRAMS/M^2
BH CV ECHO MEAS - LV MASS(C)S: 129.9 GRAMS
BH CV ECHO MEAS - LV MASS(C)SI: 55.9 GRAMS/M^2
BH CV ECHO MEAS - LV MAX PG: 2 MMHG
BH CV ECHO MEAS - LV MEAN PG: 1 MMHG
BH CV ECHO MEAS - LV SYSTOLIC VOL/BSA (12-30): 4.8 ML/M^2
BH CV ECHO MEAS - LV V1 MAX: 71.1 CM/SEC
BH CV ECHO MEAS - LV V1 MEAN: 43.1 CM/SEC
BH CV ECHO MEAS - LV V1 VTI: 14.5 CM
BH CV ECHO MEAS - LVIDD: 4.5 CM
BH CV ECHO MEAS - LVIDS: 2.6 CM
BH CV ECHO MEAS - LVLD AP4: 6.2 CM
BH CV ECHO MEAS - LVLS AP4: 4.3 CM
BH CV ECHO MEAS - LVOT AREA (M): 2.9 CM^2
BH CV ECHO MEAS - LVOT AREA: 2.9 CM^2
BH CV ECHO MEAS - LVOT DIAM: 1.9 CM
BH CV ECHO MEAS - LVPWD: 0.87 CM
BH CV ECHO MEAS - LVPWS: 1.6 CM
BH CV ECHO MEAS - MED PEAK E' VEL: 6.6 CM/SEC
BH CV ECHO MEAS - MEDIAL E/E' RATIO: 13.4
BH CV ECHO MEAS - MV A MAX VEL: 101 CM/SEC
BH CV ECHO MEAS - MV DEC TIME: 0.31 SEC
BH CV ECHO MEAS - MV E MAX VEL: 89.3 CM/SEC
BH CV ECHO MEAS - MV E/A: 0.88
BH CV ECHO MEAS - MV MAX PG: 3.9 MMHG
BH CV ECHO MEAS - MV MEAN PG: 2 MMHG
BH CV ECHO MEAS - MV V2 MAX: 99.2 CM/SEC
BH CV ECHO MEAS - MV V2 MEAN: 67.6 CM/SEC
BH CV ECHO MEAS - MV V2 VTI: 25.5 CM
BH CV ECHO MEAS - MVA(VTI): 1.7 CM^2
BH CV ECHO MEAS - PA ACC TIME: 0.08 SEC
BH CV ECHO MEAS - PA MAX PG (FULL): 1.8 MMHG
BH CV ECHO MEAS - PA MAX PG: 4.2 MMHG
BH CV ECHO MEAS - PA MEAN PG (FULL): 1 MMHG
BH CV ECHO MEAS - PA MEAN PG: 2 MMHG
BH CV ECHO MEAS - PA PR(ACCEL): 44.4 MMHG
BH CV ECHO MEAS - PA V2 MAX: 102 CM/SEC
BH CV ECHO MEAS - PA V2 MEAN: 70.8 CM/SEC
BH CV ECHO MEAS - PA V2 VTI: 21.8 CM
BH CV ECHO MEAS - PULM A REVS DUR: 0.12 SEC
BH CV ECHO MEAS - PULM A REVS VEL: 25.2 CM/SEC
BH CV ECHO MEAS - PULM DIAS VEL: 48.7 CM/SEC
BH CV ECHO MEAS - PULM S/D: 1.1
BH CV ECHO MEAS - PULM SYS VEL: 56 CM/SEC
BH CV ECHO MEAS - RAP SYSTOLE: 15 MMHG
BH CV ECHO MEAS - RV MAX PG: 2.4 MMHG
BH CV ECHO MEAS - RV MEAN PG: 1 MMHG
BH CV ECHO MEAS - RV V1 MAX: 76.9 CM/SEC
BH CV ECHO MEAS - RV V1 MEAN: 54 CM/SEC
BH CV ECHO MEAS - RV V1 VTI: 18 CM
BH CV ECHO MEAS - RVSP: 81 MMHG
BH CV ECHO MEAS - SI(CUBED): 31.7 ML/M^2
BH CV ECHO MEAS - SI(LVOT): 18.3 ML/M^2
BH CV ECHO MEAS - SI(MOD-SP4): 17 ML/M^2
BH CV ECHO MEAS - SI(TEICH): 29.3 ML/M^2
BH CV ECHO MEAS - SV(CUBED): 73.8 ML
BH CV ECHO MEAS - SV(LVOT): 42.4 ML
BH CV ECHO MEAS - SV(MOD-SP4): 39.6 ML
BH CV ECHO MEAS - SV(TEICH): 68.1 ML
BH CV ECHO MEAS - TAPSE (>1.6): 2.4 CM
BH CV ECHO MEAS - TR MAX PG: 66 MMHG
BH CV ECHO MEAS - TR MAX VEL: 404.8 CM/SEC
BH CV ECHO MEASUREMENTS AVERAGE E/E' RATIO: 13.53
BH CV XLRA - RV BASE: 5.8 CM
BH CV XLRA - RV LENGTH: 8.7 CM
BH CV XLRA - RV MID: 5.6 CM
BH CV XLRA - TDI S': 11.5 CM/SEC
BILIRUB SERPL-MCNC: 0.6 MG/DL (ref 0–1.2)
BILIRUB SERPL-MCNC: 0.7 MG/DL (ref 0–1.2)
BILIRUB SERPL-MCNC: 0.8 MG/DL (ref 0.3–1.2)
BILIRUB SERPL-MCNC: 0.9 MG/DL (ref 0.3–1.2)
BILIRUB SERPL-MCNC: 0.9 MG/DL (ref 0–1.2)
BILIRUB SERPL-MCNC: 0.9 MG/DL (ref 0–1.2)
BILIRUB SERPL-MCNC: 1 MG/DL (ref 0–1.2)
BILIRUB SERPL-MCNC: 1.5 MG/DL (ref 0.3–1.2)
BUN BLDV-MCNC: 22 MG/DL (ref 6–20)
BUN BLDV-MCNC: 25 MG/DL (ref 6–20)
BUN BLDV-MCNC: 63 MG/DL (ref 6–20)
BUN BLDV-MCNC: 66 MG/DL (ref 6–20)
BUN BLDV-MCNC: 67 MG/DL (ref 6–20)
BUN SERPL-MCNC: 24 MG/DL (ref 6–20)
BUN SERPL-MCNC: 26 MG/DL (ref 6–20)
BUN SERPL-MCNC: 36 MG/DL (ref 6–20)
BUN SERPL-MCNC: 37 MG/DL (ref 6–20)
BUN SERPL-MCNC: 42 MG/DL (ref 6–20)
BUN SERPL-MCNC: 43 MG/DL (ref 6–20)
BUN SERPL-MCNC: 46 MG/DL (ref 6–20)
BUN/CREAT SERPL: 17.1 (ref 7–25)
BUN/CREAT SERPL: 18.3 (ref 7–25)
BUN/CREAT SERPL: 19.7 (ref 7–25)
BUN/CREAT SERPL: 20.7 (ref 7–25)
BUN/CREAT SERPL: 23.7 (ref 7–25)
BUN/CREAT SERPL: 29.9 (ref 7–25)
BUN/CREAT SERPL: 33.3 (ref 7–25)
C PNEUM DNA NPH QL NAA+NON-PROBE: NOT DETECTED
CALCIUM SERPL-MCNC: 10.2 MG/DL (ref 8.5–10.5)
CALCIUM SERPL-MCNC: 10.3 MG/DL (ref 8.5–10.5)
CALCIUM SERPL-MCNC: 9.3 MG/DL (ref 8.5–10.5)
CALCIUM SERPL-MCNC: 9.3 MG/DL (ref 8.5–10.5)
CALCIUM SERPL-MCNC: 9.6 MG/DL (ref 8.5–10.5)
CALCIUM SPEC-SCNC: 10.3 MG/DL (ref 8.6–10.5)
CALCIUM SPEC-SCNC: 9.4 MG/DL (ref 8.6–10.5)
CALCIUM SPEC-SCNC: 9.5 MG/DL (ref 8.6–10.5)
CALCIUM SPEC-SCNC: 9.6 MG/DL (ref 8.6–10.5)
CALCIUM SPEC-SCNC: 9.7 MG/DL (ref 8.6–10.5)
CALCIUM SPEC-SCNC: 9.9 MG/DL (ref 8.6–10.5)
CALCIUM SPEC-SCNC: 9.9 MG/DL (ref 8.6–10.5)
CHLORIDE BLD-SCNC: 100 MMOL/L (ref 98–107)
CHLORIDE BLD-SCNC: 94 MMOL/L (ref 98–107)
CHLORIDE BLD-SCNC: 95 MMOL/L (ref 98–107)
CHLORIDE BLD-SCNC: 95 MMOL/L (ref 98–107)
CHLORIDE BLD-SCNC: 97 MMOL/L (ref 98–107)
CHLORIDE SERPL-SCNC: 100 MMOL/L (ref 98–107)
CHLORIDE SERPL-SCNC: 101 MMOL/L (ref 98–107)
CHLORIDE SERPL-SCNC: 101 MMOL/L (ref 98–107)
CHLORIDE SERPL-SCNC: 96 MMOL/L (ref 98–107)
CHLORIDE SERPL-SCNC: 96 MMOL/L (ref 98–107)
CHLORIDE SERPL-SCNC: 99 MMOL/L (ref 98–107)
CHLORIDE SERPL-SCNC: 99 MMOL/L (ref 98–107)
CO2 SERPL-SCNC: 26 MMOL/L (ref 22–29)
CO2 SERPL-SCNC: 26.8 MMOL/L (ref 22–29)
CO2 SERPL-SCNC: 29.7 MMOL/L (ref 22–29)
CO2 SERPL-SCNC: 31.3 MMOL/L (ref 22–29)
CO2 SERPL-SCNC: 34.6 MMOL/L (ref 22–29)
CO2: 26 MMOL/L (ref 20–30)
CO2: 27 MMOL/L (ref 20–30)
CO2: 28 MMOL/L (ref 20–30)
CO2: 28 MMOL/L (ref 20–30)
CO2: 29 MMOL/L (ref 20–30)
COHGB MFR BLD: 1.2 % (ref 0–2)
CREAT SERPL-MCNC: 1.31 MG/DL (ref 0.57–1)
CREAT SERPL-MCNC: 1.32 MG/DL (ref 0.57–1)
CREAT SERPL-MCNC: 1.38 MG/DL (ref 0.57–1)
CREAT SERPL-MCNC: 1.4 MG/DL (ref 0.4–1.2)
CREAT SERPL-MCNC: 1.4 MG/DL (ref 0.4–1.2)
CREAT SERPL-MCNC: 1.44 MG/DL (ref 0.57–1)
CREAT SERPL-MCNC: 1.56 MG/DL (ref 0.57–1)
CREAT SERPL-MCNC: 2.03 MG/DL (ref 0.57–1)
CREAT SERPL-MCNC: 2.11 MG/DL (ref 0.57–1)
CREAT SERPL-MCNC: 3.3 MG/DL (ref 0.4–1.2)
CREAT SERPL-MCNC: 4 MG/DL (ref 0.4–1.2)
CREAT SERPL-MCNC: 4.1 MG/DL (ref 0.4–1.2)
D-LACTATE SERPL-SCNC: 2.3 MMOL/L (ref 0.5–2)
D-LACTATE SERPL-SCNC: 2.5 MMOL/L (ref 0.5–2)
DEPRECATED RDW RBC AUTO: 54.4 FL (ref 37–54)
DEPRECATED RDW RBC AUTO: 55.4 FL (ref 37–54)
DEPRECATED RDW RBC AUTO: 55.6 FL (ref 37–54)
DEPRECATED RDW RBC AUTO: 55.8 FL (ref 37–54)
DEPRECATED RDW RBC AUTO: 56.3 FL (ref 37–54)
DEPRECATED RDW RBC AUTO: 66.6 FL (ref 37–54)
EOSINOPHIL # BLD AUTO: 0 10*3/MM3 (ref 0–0.4)
EOSINOPHIL # BLD AUTO: 0 10*3/MM3 (ref 0–0.4)
EOSINOPHIL # BLD AUTO: 0.05 10*3/MM3 (ref 0–0.4)
EOSINOPHIL # BLD AUTO: 0.06 10*3/MM3 (ref 0–0.4)
EOSINOPHIL # BLD AUTO: 0.13 10*3/MM3 (ref 0–0.4)
EOSINOPHIL NFR BLD AUTO: 0 % (ref 0.3–6.2)
EOSINOPHIL NFR BLD AUTO: 0 % (ref 0.3–6.2)
EOSINOPHIL NFR BLD AUTO: 0.5 % (ref 0.3–6.2)
EOSINOPHIL NFR BLD AUTO: 0.6 % (ref 0.3–6.2)
EOSINOPHIL NFR BLD AUTO: 1.6 % (ref 0.3–6.2)
EOSINOPHILS ABSOLUTE: 0 K/UL (ref 0–0.4)
EOSINOPHILS ABSOLUTE: 0.1 K/UL (ref 0–0.4)
EOSINOPHILS RELATIVE PERCENT: 0.1 %
EOSINOPHILS RELATIVE PERCENT: 1.1 %
ERYTHROCYTE [DISTWIDTH] IN BLOOD BY AUTOMATED COUNT: 15.1 % (ref 12.3–15.4)
ERYTHROCYTE [DISTWIDTH] IN BLOOD BY AUTOMATED COUNT: 15.2 % (ref 12.3–15.4)
ERYTHROCYTE [DISTWIDTH] IN BLOOD BY AUTOMATED COUNT: 15.6 % (ref 12.3–15.4)
ERYTHROCYTE [DISTWIDTH] IN BLOOD BY AUTOMATED COUNT: 15.7 % (ref 12.3–15.4)
ERYTHROCYTE [DISTWIDTH] IN BLOOD BY AUTOMATED COUNT: 15.7 % (ref 12.3–15.4)
ERYTHROCYTE [DISTWIDTH] IN BLOOD BY AUTOMATED COUNT: 18.1 % (ref 12.3–15.4)
FLUAV SUBTYP SPEC NAA+PROBE: NOT DETECTED
FLUBV RNA ISLT QL NAA+PROBE: NOT DETECTED
GAS FLOW AIRWAY: 8 LPM
GFR AFRICAN AMERICAN: 14
GFR AFRICAN AMERICAN: 14
GFR AFRICAN AMERICAN: 17
GFR AFRICAN AMERICAN: 47
GFR AFRICAN AMERICAN: 47
GFR NON-AFRICAN AMERICAN: 11
GFR NON-AFRICAN AMERICAN: 11
GFR NON-AFRICAN AMERICAN: 14
GFR NON-AFRICAN AMERICAN: 39
GFR NON-AFRICAN AMERICAN: 39
GFR SERPL CREATININE-BSD FRML MDRD: 24 ML/MIN/1.73
GFR SERPL CREATININE-BSD FRML MDRD: 25 ML/MIN/1.73
GFR SERPL CREATININE-BSD FRML MDRD: 34 ML/MIN/1.73
GFR SERPL CREATININE-BSD FRML MDRD: 37 ML/MIN/1.73
GFR SERPL CREATININE-BSD FRML MDRD: 39 ML/MIN/1.73
GFR SERPL CREATININE-BSD FRML MDRD: 41 ML/MIN/1.73
GFR SERPL CREATININE-BSD FRML MDRD: 42 ML/MIN/1.73
GLOBULIN UR ELPH-MCNC: 3.1 GM/DL
GLOBULIN UR ELPH-MCNC: 3.4 GM/DL
GLOBULIN UR ELPH-MCNC: 3.7 GM/DL
GLOBULIN UR ELPH-MCNC: 4.1 GM/DL
GLOBULIN UR ELPH-MCNC: 4.1 GM/DL
GLOBULIN: 2.8 G/DL
GLOBULIN: 3.2 G/DL
GLOBULIN: 3.6 G/DL
GLUCOSE BLD-MCNC: 113 MG/DL (ref 74–106)
GLUCOSE BLD-MCNC: 149 MG/DL (ref 74–106)
GLUCOSE BLD-MCNC: 170 MG/DL (ref 74–106)
GLUCOSE BLD-MCNC: 60 MG/DL (ref 74–106)
GLUCOSE BLD-MCNC: 71 MG/DL (ref 74–106)
GLUCOSE BLD-MCNC: 76 MG/DL (ref 74–106)
GLUCOSE BLDC GLUCOMTR-MCNC: 119 MG/DL (ref 70–130)
GLUCOSE BLDC GLUCOMTR-MCNC: 136 MG/DL (ref 70–130)
GLUCOSE BLDC GLUCOMTR-MCNC: 138 MG/DL (ref 70–130)
GLUCOSE BLDC GLUCOMTR-MCNC: 146 MG/DL (ref 70–130)
GLUCOSE BLDC GLUCOMTR-MCNC: 157 MG/DL (ref 70–130)
GLUCOSE BLDC GLUCOMTR-MCNC: 158 MG/DL (ref 70–130)
GLUCOSE BLDC GLUCOMTR-MCNC: 158 MG/DL (ref 70–130)
GLUCOSE BLDC GLUCOMTR-MCNC: 161 MG/DL (ref 70–130)
GLUCOSE BLDC GLUCOMTR-MCNC: 192 MG/DL (ref 70–130)
GLUCOSE BLDC GLUCOMTR-MCNC: 208 MG/DL (ref 70–130)
GLUCOSE BLDC GLUCOMTR-MCNC: 69 MG/DL (ref 70–130)
GLUCOSE BLDC GLUCOMTR-MCNC: 81 MG/DL (ref 70–130)
GLUCOSE BLDC GLUCOMTR-MCNC: 85 MG/DL (ref 70–130)
GLUCOSE BLDC GLUCOMTR-MCNC: 97 MG/DL (ref 70–130)
GLUCOSE SERPL-MCNC: 106 MG/DL (ref 65–99)
GLUCOSE SERPL-MCNC: 124 MG/DL (ref 65–99)
GLUCOSE SERPL-MCNC: 143 MG/DL (ref 65–99)
GLUCOSE SERPL-MCNC: 161 MG/DL (ref 65–99)
GLUCOSE SERPL-MCNC: 67 MG/DL (ref 65–99)
GLUCOSE SERPL-MCNC: 73 MG/DL (ref 65–99)
GLUCOSE SERPL-MCNC: 97 MG/DL (ref 65–99)
GRAM STN SPEC: NORMAL
HADV DNA SPEC NAA+PROBE: NOT DETECTED
HBA1C MFR BLD: 6.6 %
HCO3 BLDA-SCNC: 29.6 MMOL/L (ref 22–28)
HCOV 229E RNA SPEC QL NAA+PROBE: NOT DETECTED
HCOV HKU1 RNA SPEC QL NAA+PROBE: NOT DETECTED
HCOV NL63 RNA SPEC QL NAA+PROBE: NOT DETECTED
HCOV OC43 RNA SPEC QL NAA+PROBE: DETECTED
HCT VFR BLD AUTO: 41.4 % (ref 34–46.6)
HCT VFR BLD AUTO: 45.2 % (ref 34–46.6)
HCT VFR BLD AUTO: 45.5 % (ref 34–46.6)
HCT VFR BLD AUTO: 46.6 % (ref 34–46.6)
HCT VFR BLD AUTO: 49.5 % (ref 34–46.6)
HCT VFR BLD AUTO: 54.4 % (ref 34–46.6)
HCT VFR BLD CALC: 40.4 % (ref 37–47)
HCT VFR BLD CALC: 42.2 % (ref 37–47)
HCT VFR BLD CALC: 44.9 %
HCT VFR BLD CALC: 50.5 % (ref 37–47)
HEMOGLOBIN: 12.2 G/DL (ref 11.5–16.5)
HEMOGLOBIN: 12.7 G/DL (ref 11.5–16.5)
HEMOGLOBIN: 15.7 G/DL (ref 11.5–16.5)
HGB BLD-MCNC: 12.9 G/DL (ref 12–15.9)
HGB BLD-MCNC: 14.1 G/DL (ref 12–15.9)
HGB BLD-MCNC: 14.2 G/DL (ref 12–15.9)
HGB BLD-MCNC: 14.6 G/DL (ref 12–15.9)
HGB BLD-MCNC: 15.6 G/DL (ref 12–15.9)
HGB BLD-MCNC: 16.7 G/DL (ref 12–15.9)
HMPV RNA NPH QL NAA+NON-PROBE: NOT DETECTED
HOLD SPECIMEN: NORMAL
HOLD SPECIMEN: NORMAL
HPIV1 RNA SPEC QL NAA+PROBE: NOT DETECTED
HPIV2 RNA SPEC QL NAA+PROBE: NOT DETECTED
HPIV3 RNA NPH QL NAA+PROBE: NOT DETECTED
HPIV4 P GENE NPH QL NAA+PROBE: NOT DETECTED
IMM GRANULOCYTES # BLD AUTO: 0.03 10*3/MM3 (ref 0–0.05)
IMM GRANULOCYTES # BLD AUTO: 0.03 10*3/MM3 (ref 0–0.05)
IMM GRANULOCYTES # BLD AUTO: 0.05 10*3/MM3 (ref 0–0.05)
IMM GRANULOCYTES # BLD AUTO: 0.05 10*3/MM3 (ref 0–0.05)
IMM GRANULOCYTES # BLD AUTO: 0.08 10*3/MM3 (ref 0–0.05)
IMM GRANULOCYTES NFR BLD AUTO: 0.3 % (ref 0–0.5)
IMM GRANULOCYTES NFR BLD AUTO: 0.4 % (ref 0–0.5)
IMM GRANULOCYTES NFR BLD AUTO: 0.4 % (ref 0–0.5)
IMM GRANULOCYTES NFR BLD AUTO: 0.6 % (ref 0–0.5)
IMM GRANULOCYTES NFR BLD AUTO: 0.7 % (ref 0–0.5)
IMMATURE GRANULOCYTES #: 0 K/UL
IMMATURE GRANULOCYTES #: 0 K/UL
IMMATURE GRANULOCYTES %: 0.3 % (ref 0–5)
IMMATURE GRANULOCYTES %: 0.4 % (ref 0–5)
INHALED O2 CONCENTRATION: 50 %
LYMPHOCYTES # BLD AUTO: 0.6 10*3/MM3 (ref 0.7–3.1)
LYMPHOCYTES # BLD AUTO: 1.33 10*3/MM3 (ref 0.7–3.1)
LYMPHOCYTES # BLD AUTO: 1.49 10*3/MM3 (ref 0.7–3.1)
LYMPHOCYTES # BLD AUTO: 1.67 10*3/MM3 (ref 0.7–3.1)
LYMPHOCYTES # BLD AUTO: 2.11 10*3/MM3 (ref 0.7–3.1)
LYMPHOCYTES ABSOLUTE: 0.5 K/UL (ref 1.5–4)
LYMPHOCYTES ABSOLUTE: 0.8 K/UL (ref 1.5–4)
LYMPHOCYTES NFR BLD AUTO: 14.6 % (ref 19.6–45.3)
LYMPHOCYTES NFR BLD AUTO: 15.2 % (ref 19.6–45.3)
LYMPHOCYTES NFR BLD AUTO: 16.4 % (ref 19.6–45.3)
LYMPHOCYTES NFR BLD AUTO: 18.8 % (ref 19.6–45.3)
LYMPHOCYTES NFR BLD AUTO: 8.5 % (ref 19.6–45.3)
LYMPHOCYTES RELATIVE PERCENT: 10.7 %
LYMPHOCYTES RELATIVE PERCENT: 6.4 %
M PNEUMO IGG SER IA-ACNC: NOT DETECTED
MAGNESIUM SERPL-MCNC: 2.3 MG/DL (ref 1.6–2.6)
MCH RBC QN AUTO: 29.7 PG (ref 27–32)
MCH RBC QN AUTO: 30.4 PG (ref 26.6–33)
MCH RBC QN AUTO: 30.6 PG (ref 27–32)
MCH RBC QN AUTO: 30.7 PG (ref 27–32)
MCH RBC QN AUTO: 31.3 PG (ref 26.6–33)
MCHC RBC AUTO-ENTMCNC: 30.1 G/DL (ref 31–35)
MCHC RBC AUTO-ENTMCNC: 30.2 G/DL (ref 31–35)
MCHC RBC AUTO-ENTMCNC: 30.7 G/DL (ref 31.5–35.7)
MCHC RBC AUTO-ENTMCNC: 31.1 G/DL (ref 31–35)
MCHC RBC AUTO-ENTMCNC: 31.2 G/DL (ref 31.5–35.7)
MCHC RBC AUTO-ENTMCNC: 31.3 G/DL (ref 31.5–35.7)
MCHC RBC AUTO-ENTMCNC: 31.5 G/DL (ref 31.5–35.7)
MCV RBC AUTO: 100.5 FL (ref 79–97)
MCV RBC AUTO: 101.7 FL (ref 80–100)
MCV RBC AUTO: 101.8 FL (ref 80–100)
MCV RBC AUTO: 95.6 FL (ref 80–100)
MCV RBC AUTO: 96.5 FL (ref 79–97)
MCV RBC AUTO: 96.9 FL (ref 79–97)
MCV RBC AUTO: 97.4 FL (ref 79–97)
MCV RBC AUTO: 97.4 FL (ref 79–97)
MCV RBC AUTO: 98.9 FL (ref 79–97)
METHGB BLD QL: 0.6 % (ref 0–1.5)
MODALITY: ABNORMAL
MONOCYTES # BLD AUTO: 0.16 10*3/MM3 (ref 0.1–0.9)
MONOCYTES # BLD AUTO: 0.63 10*3/MM3 (ref 0.1–0.9)
MONOCYTES # BLD AUTO: 0.64 10*3/MM3 (ref 0.1–0.9)
MONOCYTES # BLD AUTO: 0.95 10*3/MM3 (ref 0.1–0.9)
MONOCYTES # BLD AUTO: 1.07 10*3/MM3 (ref 0.1–0.9)
MONOCYTES ABSOLUTE: 0.2 K/UL (ref 0.2–0.8)
MONOCYTES ABSOLUTE: 0.6 K/UL (ref 0.2–0.8)
MONOCYTES NFR BLD AUTO: 2.3 % (ref 5–12)
MONOCYTES NFR BLD AUTO: 7.3 % (ref 5–12)
MONOCYTES NFR BLD AUTO: 7.9 % (ref 5–12)
MONOCYTES NFR BLD AUTO: 8.3 % (ref 5–12)
MONOCYTES NFR BLD AUTO: 8.3 % (ref 5–12)
MONOCYTES RELATIVE PERCENT: 3 %
MONOCYTES RELATIVE PERCENT: 8.2 %
MRSA DNA SPEC QL NAA+PROBE: NORMAL
NEUTROPHILS ABSOLUTE: 5.8 K/UL (ref 2–7.5)
NEUTROPHILS ABSOLUTE: 6.6 K/UL (ref 2–7.5)
NEUTROPHILS NFR BLD AUTO: 5.59 10*3/MM3 (ref 1.7–7)
NEUTROPHILS NFR BLD AUTO: 6.23 10*3/MM3 (ref 1.7–7)
NEUTROPHILS NFR BLD AUTO: 6.69 10*3/MM3 (ref 1.7–7)
NEUTROPHILS NFR BLD AUTO: 70.4 % (ref 42.7–76)
NEUTROPHILS NFR BLD AUTO: 74 % (ref 42.7–76)
NEUTROPHILS NFR BLD AUTO: 76.3 % (ref 42.7–76)
NEUTROPHILS NFR BLD AUTO: 76.5 % (ref 42.7–76)
NEUTROPHILS NFR BLD AUTO: 8.72 10*3/MM3 (ref 1.7–7)
NEUTROPHILS NFR BLD AUTO: 88.4 % (ref 42.7–76)
NEUTROPHILS NFR BLD AUTO: 9.48 10*3/MM3 (ref 1.7–7)
NEUTROPHILS RELATIVE PERCENT: 79.2 %
NEUTROPHILS RELATIVE PERCENT: 90 %
NOTE: ABNORMAL
NRBC BLD AUTO-RTO: 0 /100 WBC (ref 0–0.2)
NT-PROBNP SERPL-MCNC: 8744 PG/ML (ref 0–900)
NT-PROBNP SERPL-MCNC: ABNORMAL PG/ML (ref 0–900)
OXYHGB MFR BLDV: 89.7 % (ref 94–99)
PCO2 BLDA: 51.6 MM HG (ref 35–45)
PCO2 TEMP ADJ BLD: ABNORMAL MM[HG]
PDW BLD-RTO: 14.8 % (ref 11–16)
PDW BLD-RTO: 17.5 % (ref 11–16)
PDW BLD-RTO: 17.6 % (ref 11–16)
PERFORMED ON: ABNORMAL
PH BLDA: 7.37 PH UNITS (ref 7.35–7.45)
PH, TEMP CORRECTED: ABNORMAL
PLATELET # BLD AUTO: 174 10*3/MM3 (ref 140–450)
PLATELET # BLD AUTO: 175 10*3/MM3 (ref 140–450)
PLATELET # BLD AUTO: 185 10*3/MM3 (ref 140–450)
PLATELET # BLD AUTO: 215 10*3/MM3 (ref 140–450)
PLATELET # BLD AUTO: 239 10*3/MM3 (ref 140–450)
PLATELET # BLD AUTO: 245 10*3/MM3 (ref 140–450)
PLATELET # BLD: 171 K/UL (ref 150–400)
PLATELET # BLD: 204 K/UL (ref 150–400)
PLATELET # BLD: 256 K/UL (ref 150–400)
PMV BLD AUTO: 10.8 FL (ref 6–12)
PMV BLD AUTO: 11 FL (ref 6–12)
PMV BLD AUTO: 11.1 FL (ref 6–10)
PMV BLD AUTO: 11.2 FL (ref 6–12)
PMV BLD AUTO: 11.4 FL (ref 6–12)
PMV BLD AUTO: 11.5 FL (ref 6–12)
PMV BLD AUTO: 11.7 FL (ref 6–10)
PMV BLD AUTO: 11.7 FL (ref 6–12)
PMV BLD AUTO: 11.9 FL (ref 6–10)
PO2 BLDA: 65.6 MM HG (ref 75–100)
PO2 TEMP ADJ BLD: ABNORMAL MM[HG]
POTASSIUM REFLEX MAGNESIUM: 5.5 MMOL/L (ref 3.4–5.1)
POTASSIUM SERPL-SCNC: 4.1 MMOL/L (ref 3.5–5.2)
POTASSIUM SERPL-SCNC: 4.3 MMOL/L (ref 3.5–5.2)
POTASSIUM SERPL-SCNC: 4.4 MMOL/L (ref 3.5–5.2)
POTASSIUM SERPL-SCNC: 4.5 MMOL/L (ref 3.5–5.2)
POTASSIUM SERPL-SCNC: 4.6 MMOL/L (ref 3.5–5.2)
POTASSIUM SERPL-SCNC: 4.7 MMOL/L (ref 3.4–5.1)
POTASSIUM SERPL-SCNC: 4.9 MMOL/L (ref 3.4–5.1)
POTASSIUM SERPL-SCNC: 5.1 MMOL/L (ref 3.5–5.2)
POTASSIUM SERPL-SCNC: 5.4 MMOL/L (ref 3.4–5.1)
POTASSIUM SERPL-SCNC: 5.4 MMOL/L (ref 3.5–5.2)
POTASSIUM SERPL-SCNC: 7 MMOL/L (ref 3.4–5.1)
PRO-BNP: ABNORMAL PG/ML (ref 0–1800)
PRO-BNP: ABNORMAL PG/ML (ref 0–1800)
PROCALCITONIN SERPL-MCNC: 0.07 NG/ML (ref 0–0.25)
PROCALCITONIN SERPL-MCNC: 0.11 NG/ML (ref 0–0.25)
PROT SERPL-MCNC: 6.8 G/DL (ref 6–8.5)
PROT SERPL-MCNC: 7.3 G/DL (ref 6–8.5)
PROT SERPL-MCNC: 7.4 G/DL (ref 6–8.5)
PROT SERPL-MCNC: 7.7 G/DL (ref 6–8.5)
PROT SERPL-MCNC: 8.1 G/DL (ref 6–8.5)
QT INTERVAL: 494 MS
QTC INTERVAL: 437 MS
RBC # BLD AUTO: 4.12 10*6/MM3 (ref 3.77–5.28)
RBC # BLD AUTO: 4.64 10*6/MM3 (ref 3.77–5.28)
RBC # BLD AUTO: 4.67 10*6/MM3 (ref 3.77–5.28)
RBC # BLD AUTO: 4.81 10*6/MM3 (ref 3.77–5.28)
RBC # BLD AUTO: 5.13 10*6/MM3 (ref 3.77–5.28)
RBC # BLD AUTO: 5.5 10*6/MM3 (ref 3.77–5.28)
RBC # BLD: 3.97 M/UL (ref 3.8–5.8)
RBC # BLD: 4.15 M/UL (ref 3.8–5.8)
RBC # BLD: 5.28 M/UL (ref 3.8–5.8)
RBC MORPH BLD: NORMAL
REASON FOR REJECTION: NORMAL
REJECTED TEST: NORMAL
RHINOVIRUS RNA SPEC NAA+PROBE: NOT DETECTED
RSV RNA NPH QL NAA+NON-PROBE: NOT DETECTED
SAO2 % BLDCOA: 91.3 % (ref 94–100)
SARS-COV-2 RNA NPH QL NAA+NON-PROBE: NOT DETECTED
SARS-COV-2, NAAT: NOT DETECTED
SMALL PLATELETS BLD QL SMEAR: ADEQUATE
SODIUM BLD-SCNC: 132 MMOL/L (ref 136–145)
SODIUM BLD-SCNC: 134 MMOL/L (ref 136–145)
SODIUM BLD-SCNC: 135 MMOL/L (ref 136–145)
SODIUM BLD-SCNC: 135 MMOL/L (ref 136–145)
SODIUM BLD-SCNC: 138 MMOL/L (ref 136–145)
SODIUM SERPL-SCNC: 135 MMOL/L (ref 136–145)
SODIUM SERPL-SCNC: 140 MMOL/L (ref 136–145)
SODIUM SERPL-SCNC: 140 MMOL/L (ref 136–145)
SODIUM SERPL-SCNC: 141 MMOL/L (ref 136–145)
SODIUM SERPL-SCNC: 141 MMOL/L (ref 136–145)
SODIUM SERPL-SCNC: 142 MMOL/L (ref 136–145)
SODIUM SERPL-SCNC: 143 MMOL/L (ref 136–145)
TOTAL PROTEIN: 6.5 G/DL (ref 6.4–8.3)
TOTAL PROTEIN: 7.1 G/DL (ref 6.4–8.3)
TOTAL PROTEIN: 7.2 G/DL (ref 6.4–8.3)
TROPONIN T SERPL-MCNC: <0.01 NG/ML (ref 0–0.03)
TROPONIN T SERPL-MCNC: <0.01 NG/ML (ref 0–0.03)
TROPONIN: <0.3 NG/ML
TROPONIN: <0.3 NG/ML
VANCOMYCIN SERPL-MCNC: 29.3 MCG/ML (ref 5–40)
VENTILATOR MODE: ABNORMAL
WBC # BLD AUTO: 11.41 10*3/MM3 (ref 3.4–10.8)
WBC # BLD AUTO: 12.83 10*3/MM3 (ref 3.4–10.8)
WBC # BLD AUTO: 7.05 10*3/MM3 (ref 3.4–10.8)
WBC # BLD AUTO: 7.94 10*3/MM3 (ref 3.4–10.8)
WBC # BLD AUTO: 8.77 10*3/MM3 (ref 3.4–10.8)
WBC # BLD AUTO: 9.88 10*3/MM3 (ref 3.4–10.8)
WBC # BLD: 11.9 K/UL (ref 4–11)
WBC # BLD: 7.3 K/UL (ref 4–11)
WBC # BLD: 7.3 K/UL (ref 4–11)
WBC MORPH BLD: NORMAL
WHOLE BLOOD HOLD SPECIMEN: NORMAL

## 2021-01-01 PROCEDURE — G0378 HOSPITAL OBSERVATION PER HR: HCPCS

## 2021-01-01 PROCEDURE — 99217 PR OBSERVATION CARE DISCHARGE MANAGEMENT: CPT | Performed by: NURSE PRACTITIONER

## 2021-01-01 PROCEDURE — 76536 US EXAM OF HEAD AND NECK: CPT

## 2021-01-01 PROCEDURE — 82962 GLUCOSE BLOOD TEST: CPT

## 2021-01-01 PROCEDURE — 99284 EMERGENCY DEPT VISIT MOD MDM: CPT

## 2021-01-01 PROCEDURE — 25010000002 FUROSEMIDE PER 20 MG: Performed by: INTERNAL MEDICINE

## 2021-01-01 PROCEDURE — 99214 OFFICE O/P EST MOD 30 MIN: CPT | Performed by: NURSE PRACTITIONER

## 2021-01-01 PROCEDURE — 36415 COLL VENOUS BLD VENIPUNCTURE: CPT

## 2021-01-01 PROCEDURE — 80053 COMPREHEN METABOLIC PANEL: CPT

## 2021-01-01 PROCEDURE — 84484 ASSAY OF TROPONIN QUANT: CPT

## 2021-01-01 PROCEDURE — 96376 TX/PRO/DX INJ SAME DRUG ADON: CPT

## 2021-01-01 PROCEDURE — 25010000002 METHYLPREDNISOLONE PER 125 MG: Performed by: INTERNAL MEDICINE

## 2021-01-01 PROCEDURE — 87070 CULTURE OTHR SPECIMN AEROBIC: CPT | Performed by: INTERNAL MEDICINE

## 2021-01-01 PROCEDURE — 2580000003 HC RX 258: Performed by: FAMILY MEDICINE

## 2021-01-01 PROCEDURE — 51702 INSERT TEMP BLADDER CATH: CPT

## 2021-01-01 PROCEDURE — 85025 COMPLETE CBC W/AUTO DIFF WBC: CPT | Performed by: EMERGENCY MEDICINE

## 2021-01-01 PROCEDURE — 83880 ASSAY OF NATRIURETIC PEPTIDE: CPT

## 2021-01-01 PROCEDURE — 6370000000 HC RX 637 (ALT 250 FOR IP)

## 2021-01-01 PROCEDURE — 80048 BASIC METABOLIC PNL TOTAL CA: CPT

## 2021-01-01 PROCEDURE — 85025 COMPLETE CBC W/AUTO DIFF WBC: CPT | Performed by: INTERNAL MEDICINE

## 2021-01-01 PROCEDURE — 80053 COMPREHEN METABOLIC PANEL: CPT | Performed by: NURSE PRACTITIONER

## 2021-01-01 PROCEDURE — 99225 PR SBSQ OBSERVATION CARE/DAY 25 MINUTES: CPT | Performed by: NURSE PRACTITIONER

## 2021-01-01 PROCEDURE — 85027 COMPLETE CBC AUTOMATED: CPT | Performed by: NURSE PRACTITIONER

## 2021-01-01 PROCEDURE — 25010000002 CEFTRIAXONE SODIUM-DEXTROSE 2-2.22 GM-%(50ML) RECONSTITUTED SOLUTION: Performed by: NURSE PRACTITIONER

## 2021-01-01 PROCEDURE — 84145 PROCALCITONIN (PCT): CPT | Performed by: EMERGENCY MEDICINE

## 2021-01-01 PROCEDURE — 80202 ASSAY OF VANCOMYCIN: CPT | Performed by: INTERNAL MEDICINE

## 2021-01-01 PROCEDURE — 96366 THER/PROPH/DIAG IV INF ADDON: CPT

## 2021-01-01 PROCEDURE — 99214 OFFICE O/P EST MOD 30 MIN: CPT | Performed by: INTERNAL MEDICINE

## 2021-01-01 PROCEDURE — 83605 ASSAY OF LACTIC ACID: CPT | Performed by: EMERGENCY MEDICINE

## 2021-01-01 PROCEDURE — 82805 BLOOD GASES W/O2 SATURATION: CPT

## 2021-01-01 PROCEDURE — 80048 BASIC METABOLIC PNL TOTAL CA: CPT | Performed by: INTERNAL MEDICINE

## 2021-01-01 PROCEDURE — 6370000000 HC RX 637 (ALT 250 FOR IP): Performed by: FAMILY MEDICINE

## 2021-01-01 PROCEDURE — 85025 COMPLETE CBC W/AUTO DIFF WBC: CPT | Performed by: PHYSICIAN ASSISTANT

## 2021-01-01 PROCEDURE — 85025 COMPLETE CBC W/AUTO DIFF WBC: CPT | Performed by: NURSE PRACTITIONER

## 2021-01-01 PROCEDURE — 25010000002 VANCOMYCIN 5 G RECONSTITUTED SOLUTION 5,000 MG VIAL: Performed by: INTERNAL MEDICINE

## 2021-01-01 PROCEDURE — 93971 EXTREMITY STUDY: CPT

## 2021-01-01 PROCEDURE — 99213 OFFICE O/P EST LOW 20 MIN: CPT | Performed by: SURGERY

## 2021-01-01 PROCEDURE — 0202U NFCT DS 22 TRGT SARS-COV-2: CPT | Performed by: EMERGENCY MEDICINE

## 2021-01-01 PROCEDURE — 87205 SMEAR GRAM STAIN: CPT | Performed by: INTERNAL MEDICINE

## 2021-01-01 PROCEDURE — 87641 MR-STAPH DNA AMP PROBE: CPT | Performed by: INTERNAL MEDICINE

## 2021-01-01 PROCEDURE — 94618 PULMONARY STRESS TESTING: CPT

## 2021-01-01 PROCEDURE — 6360000002 HC RX W HCPCS: Performed by: EMERGENCY MEDICINE

## 2021-01-01 PROCEDURE — 6360000002 HC RX W HCPCS: Performed by: FAMILY MEDICINE

## 2021-01-01 PROCEDURE — 93005 ELECTROCARDIOGRAM TRACING: CPT | Performed by: EMERGENCY MEDICINE

## 2021-01-01 PROCEDURE — 93005 ELECTROCARDIOGRAM TRACING: CPT | Performed by: PHYSICIAN ASSISTANT

## 2021-01-01 PROCEDURE — 99285 EMERGENCY DEPT VISIT HI MDM: CPT

## 2021-01-01 PROCEDURE — 71045 X-RAY EXAM CHEST 1 VIEW: CPT

## 2021-01-01 PROCEDURE — 94799 UNLISTED PULMONARY SVC/PX: CPT

## 2021-01-01 PROCEDURE — 94640 AIRWAY INHALATION TREATMENT: CPT

## 2021-01-01 PROCEDURE — 2500000003 HC RX 250 WO HCPCS: Performed by: FAMILY MEDICINE

## 2021-01-01 PROCEDURE — 83735 ASSAY OF MAGNESIUM: CPT | Performed by: PHYSICIAN ASSISTANT

## 2021-01-01 PROCEDURE — 93306 TTE W/DOPPLER COMPLETE: CPT | Performed by: INTERNAL MEDICINE

## 2021-01-01 PROCEDURE — 36600 WITHDRAWAL OF ARTERIAL BLOOD: CPT

## 2021-01-01 PROCEDURE — 83036 HEMOGLOBIN GLYCOSYLATED A1C: CPT

## 2021-01-01 PROCEDURE — 96367 TX/PROPH/DG ADDL SEQ IV INF: CPT

## 2021-01-01 PROCEDURE — 2500000003 HC RX 250 WO HCPCS: Performed by: EMERGENCY MEDICINE

## 2021-01-01 PROCEDURE — 83050 HGB METHEMOGLOBIN QUAN: CPT

## 2021-01-01 PROCEDURE — 25010000002 FUROSEMIDE PER 20 MG: Performed by: EMERGENCY MEDICINE

## 2021-01-01 PROCEDURE — 63710000001 PREDNISONE PER 1 MG: Performed by: NURSE PRACTITIONER

## 2021-01-01 PROCEDURE — 6370000000 HC RX 637 (ALT 250 FOR IP): Performed by: EMERGENCY MEDICINE

## 2021-01-01 PROCEDURE — 25010000002 METHYLPREDNISOLONE PER 125 MG: Performed by: EMERGENCY MEDICINE

## 2021-01-01 PROCEDURE — 85025 COMPLETE CBC W/AUTO DIFF WBC: CPT

## 2021-01-01 PROCEDURE — 99212 OFFICE O/P EST SF 10 MIN: CPT | Performed by: SURGERY

## 2021-01-01 PROCEDURE — 93306 TTE W/DOPPLER COMPLETE: CPT

## 2021-01-01 PROCEDURE — 99213 OFFICE O/P EST LOW 20 MIN: CPT | Performed by: NURSE PRACTITIONER

## 2021-01-01 PROCEDURE — 84145 PROCALCITONIN (PCT): CPT | Performed by: NURSE PRACTITIONER

## 2021-01-01 PROCEDURE — 96375 TX/PRO/DX INJ NEW DRUG ADDON: CPT

## 2021-01-01 PROCEDURE — 80053 COMPREHEN METABOLIC PANEL: CPT | Performed by: EMERGENCY MEDICINE

## 2021-01-01 PROCEDURE — 83880 ASSAY OF NATRIURETIC PEPTIDE: CPT | Performed by: PHYSICIAN ASSISTANT

## 2021-01-01 PROCEDURE — 80053 COMPREHEN METABOLIC PANEL: CPT | Performed by: INTERNAL MEDICINE

## 2021-01-01 PROCEDURE — 96365 THER/PROPH/DIAG IV INF INIT: CPT

## 2021-01-01 PROCEDURE — 80053 COMPREHEN METABOLIC PANEL: CPT | Performed by: PHYSICIAN ASSISTANT

## 2021-01-01 PROCEDURE — 87040 BLOOD CULTURE FOR BACTERIA: CPT | Performed by: EMERGENCY MEDICINE

## 2021-01-01 PROCEDURE — 25010000002 CEFTRIAXONE SODIUM-DEXTROSE 2-2.22 GM-%(50ML) RECONSTITUTED SOLUTION: Performed by: INTERNAL MEDICINE

## 2021-01-01 PROCEDURE — 82375 ASSAY CARBOXYHB QUANT: CPT

## 2021-01-01 PROCEDURE — 71046 X-RAY EXAM CHEST 2 VIEWS: CPT

## 2021-01-01 PROCEDURE — 84484 ASSAY OF TROPONIN QUANT: CPT | Performed by: PHYSICIAN ASSISTANT

## 2021-01-01 PROCEDURE — 99283 EMERGENCY DEPT VISIT LOW MDM: CPT

## 2021-01-01 PROCEDURE — 2500000003 HC RX 250 WO HCPCS

## 2021-01-01 PROCEDURE — 85007 BL SMEAR W/DIFF WBC COUNT: CPT | Performed by: INTERNAL MEDICINE

## 2021-01-01 PROCEDURE — 1111F DSCHRG MED/CURRENT MED MERGE: CPT | Performed by: NURSE PRACTITIONER

## 2021-01-01 PROCEDURE — 85027 COMPLETE CBC AUTOMATED: CPT

## 2021-01-01 PROCEDURE — 99219 PR INITIAL OBSERVATION CARE/DAY 50 MINUTES: CPT | Performed by: INTERNAL MEDICINE

## 2021-01-01 PROCEDURE — 94660 CPAP INITIATION&MGMT: CPT

## 2021-01-01 PROCEDURE — 25010000002 FUROSEMIDE PER 20 MG: Performed by: PHYSICIAN ASSISTANT

## 2021-01-01 PROCEDURE — 84484 ASSAY OF TROPONIN QUANT: CPT | Performed by: EMERGENCY MEDICINE

## 2021-01-01 PROCEDURE — 93005 ELECTROCARDIOGRAM TRACING: CPT | Performed by: NURSE PRACTITIONER

## 2021-01-01 PROCEDURE — 25010000002 CEFTRIAXONE SODIUM-DEXTROSE 1-3.74 GM-%(50ML) RECONSTITUTED SOLUTION: Performed by: EMERGENCY MEDICINE

## 2021-01-01 PROCEDURE — 96374 THER/PROPH/DIAG INJ IV PUSH: CPT

## 2021-01-01 PROCEDURE — 83880 ASSAY OF NATRIURETIC PEPTIDE: CPT | Performed by: EMERGENCY MEDICINE

## 2021-01-01 PROCEDURE — 87635 SARS-COV-2 COVID-19 AMP PRB: CPT

## 2021-01-01 RX ORDER — METFORMIN HYDROCHLORIDE 500 MG/1
TABLET, EXTENDED RELEASE ORAL
Qty: 180 TABLET | Refills: 3 | Status: SHIPPED | OUTPATIENT
Start: 2021-01-01 | End: 2022-01-01

## 2021-01-01 RX ORDER — PREGABALIN 75 MG/1
150 CAPSULE ORAL EVERY 12 HOURS SCHEDULED
Status: DISCONTINUED | OUTPATIENT
Start: 2021-01-01 | End: 2021-01-01 | Stop reason: HOSPADM

## 2021-01-01 RX ORDER — LISINOPRIL 5 MG/1
TABLET ORAL
Qty: 30 TABLET | Refills: 1 | Status: SHIPPED | OUTPATIENT
Start: 2021-01-01 | End: 2021-01-01 | Stop reason: SDUPTHER

## 2021-01-01 RX ORDER — PANTOPRAZOLE SODIUM 40 MG/1
40 TABLET, DELAYED RELEASE ORAL EVERY MORNING
Status: DISCONTINUED | OUTPATIENT
Start: 2021-01-01 | End: 2021-01-01 | Stop reason: HOSPADM

## 2021-01-01 RX ORDER — DOXYCYCLINE 100 MG/1
100 CAPSULE ORAL EVERY 12 HOURS SCHEDULED
Status: DISCONTINUED | OUTPATIENT
Start: 2021-01-01 | End: 2021-01-01 | Stop reason: HOSPADM

## 2021-01-01 RX ORDER — ALBUTEROL SULFATE 90 UG/1
6 AEROSOL, METERED RESPIRATORY (INHALATION) ONCE
Status: COMPLETED | OUTPATIENT
Start: 2021-01-01 | End: 2021-01-01

## 2021-01-01 RX ORDER — APIXABAN 2.5 MG/1
TABLET, FILM COATED ORAL
Qty: 60 TABLET | Refills: 3 | Status: SHIPPED | OUTPATIENT
Start: 2021-01-01 | End: 2021-01-01

## 2021-01-01 RX ORDER — LISINOPRIL 5 MG/1
5 TABLET ORAL DAILY
Status: DISCONTINUED | OUTPATIENT
Start: 2021-01-01 | End: 2021-01-01 | Stop reason: HOSPADM

## 2021-01-01 RX ORDER — DOCUSATE SODIUM 100 MG/1
CAPSULE, LIQUID FILLED ORAL
Qty: 180 CAPSULE | Refills: 3 | Status: SHIPPED | OUTPATIENT
Start: 2021-01-01

## 2021-01-01 RX ORDER — TIZANIDINE 4 MG/1
4 TABLET ORAL EVERY 6 HOURS PRN
Status: DISCONTINUED | OUTPATIENT
Start: 2021-01-01 | End: 2021-01-01 | Stop reason: HOSPADM

## 2021-01-01 RX ORDER — HYDROCODONE BITARTRATE AND ACETAMINOPHEN 7.5; 325 MG/1; MG/1
1 TABLET ORAL EVERY 8 HOURS PRN
Qty: 90 TABLET | Refills: 0 | Status: SHIPPED | OUTPATIENT
Start: 2021-01-01 | End: 2021-01-01

## 2021-01-01 RX ORDER — GLIMEPIRIDE 2 MG/1
4 TABLET ORAL
Status: DISCONTINUED | OUTPATIENT
Start: 2021-01-01 | End: 2021-01-01 | Stop reason: HOSPADM

## 2021-01-01 RX ORDER — HYDROCODONE BITARTRATE AND ACETAMINOPHEN 7.5; 325 MG/1; MG/1
1 TABLET ORAL EVERY 8 HOURS PRN
Status: DISCONTINUED | OUTPATIENT
Start: 2021-01-01 | End: 2021-01-01 | Stop reason: HOSPADM

## 2021-01-01 RX ORDER — LEVOFLOXACIN 500 MG/1
500 TABLET, FILM COATED ORAL DAILY
Qty: 10 TABLET | Refills: 0 | Status: SHIPPED | OUTPATIENT
Start: 2021-01-01 | End: 2021-01-01

## 2021-01-01 RX ORDER — PREGABALIN 150 MG/1
150 CAPSULE ORAL 2 TIMES DAILY
Qty: 60 CAPSULE | Refills: 5 | Status: SHIPPED | OUTPATIENT
Start: 2021-01-01 | End: 2021-01-01

## 2021-01-01 RX ORDER — OMEPRAZOLE 40 MG/1
CAPSULE, DELAYED RELEASE ORAL
Qty: 30 CAPSULE | Refills: 5 | Status: SHIPPED | OUTPATIENT
Start: 2021-01-01

## 2021-01-01 RX ORDER — FUROSEMIDE 10 MG/ML
20 INJECTION INTRAMUSCULAR; INTRAVENOUS ONCE
Status: COMPLETED | OUTPATIENT
Start: 2021-01-01 | End: 2021-01-01

## 2021-01-01 RX ORDER — IPRATROPIUM BROMIDE AND ALBUTEROL SULFATE 2.5; .5 MG/3ML; MG/3ML
1 SOLUTION RESPIRATORY (INHALATION)
Status: DISCONTINUED | OUTPATIENT
Start: 2021-01-01 | End: 2021-01-01 | Stop reason: HOSPADM

## 2021-01-01 RX ORDER — FERROUS SULFATE TAB EC 324 MG (65 MG FE EQUIVALENT) 324 (65 FE) MG
325 TABLET DELAYED RESPONSE ORAL
Status: DISCONTINUED | OUTPATIENT
Start: 2021-01-01 | End: 2021-01-01 | Stop reason: HOSPADM

## 2021-01-01 RX ORDER — APIXABAN 5 MG/1
TABLET, FILM COATED ORAL
COMMUNITY
Start: 2021-01-01

## 2021-01-01 RX ORDER — LORATADINE 10 MG/1
TABLET ORAL
Qty: 90 TABLET | Refills: 3 | Status: SHIPPED | OUTPATIENT
Start: 2021-01-01

## 2021-01-01 RX ORDER — PREGABALIN 75 MG/1
150 CAPSULE ORAL 2 TIMES DAILY
Status: DISCONTINUED | OUTPATIENT
Start: 2021-01-01 | End: 2021-01-01 | Stop reason: HOSPADM

## 2021-01-01 RX ORDER — CEFTRIAXONE 2 G/50ML
2 INJECTION, SOLUTION INTRAVENOUS EVERY 24 HOURS
Status: DISCONTINUED | OUTPATIENT
Start: 2021-01-01 | End: 2021-01-01 | Stop reason: HOSPADM

## 2021-01-01 RX ORDER — DEXTROSE MONOHYDRATE 25 G/50ML
25 INJECTION, SOLUTION INTRAVENOUS ONCE
Status: COMPLETED | OUTPATIENT
Start: 2021-01-01 | End: 2021-01-01

## 2021-01-01 RX ORDER — FUROSEMIDE 10 MG/ML
80 INJECTION INTRAMUSCULAR; INTRAVENOUS ONCE
Status: COMPLETED | OUTPATIENT
Start: 2021-01-01 | End: 2021-01-01

## 2021-01-01 RX ORDER — HYDROCODONE BITARTRATE AND ACETAMINOPHEN 7.5; 325 MG/1; MG/1
1 TABLET ORAL EVERY 8 HOURS PRN
Qty: 90 TABLET | Refills: 0 | Status: SHIPPED | OUTPATIENT
Start: 2021-01-01 | End: 2021-01-01 | Stop reason: SDUPTHER

## 2021-01-01 RX ORDER — PREGABALIN 150 MG/1
150 CAPSULE ORAL 2 TIMES DAILY
COMMUNITY

## 2021-01-01 RX ORDER — HYDROCODONE BITARTRATE AND ACETAMINOPHEN 7.5; 325 MG/1; MG/1
1 TABLET ORAL ONCE
Status: COMPLETED | OUTPATIENT
Start: 2021-01-01 | End: 2021-01-01

## 2021-01-01 RX ORDER — METHYLPREDNISOLONE SODIUM SUCCINATE 125 MG/2ML
125 INJECTION, POWDER, LYOPHILIZED, FOR SOLUTION INTRAMUSCULAR; INTRAVENOUS ONCE
Status: COMPLETED | OUTPATIENT
Start: 2021-01-01 | End: 2021-01-01

## 2021-01-01 RX ORDER — PHENAZOPYRIDINE HYDROCHLORIDE 95 MG/1
100 TABLET ORAL ONCE
Status: COMPLETED | OUTPATIENT
Start: 2021-01-01 | End: 2021-01-01

## 2021-01-01 RX ORDER — DEXTROSE MONOHYDRATE 50 MG/ML
INJECTION, SOLUTION INTRAVENOUS
Status: DISPENSED
Start: 2021-01-01 | End: 2021-01-01

## 2021-01-01 RX ORDER — SODIUM CHLORIDE 0.9 % (FLUSH) 0.9 %
10 SYRINGE (ML) INJECTION EVERY 12 HOURS SCHEDULED
Status: DISCONTINUED | OUTPATIENT
Start: 2021-01-01 | End: 2021-01-01 | Stop reason: HOSPADM

## 2021-01-01 RX ORDER — PANTOPRAZOLE SODIUM 40 MG/1
40 TABLET, DELAYED RELEASE ORAL
Status: DISCONTINUED | OUTPATIENT
Start: 2021-01-01 | End: 2021-01-01 | Stop reason: HOSPADM

## 2021-01-01 RX ORDER — PREDNISONE 20 MG/1
40 TABLET ORAL
Qty: 8 TABLET | Refills: 0 | Status: SHIPPED | OUTPATIENT
Start: 2021-01-01 | End: 2021-01-01

## 2021-01-01 RX ORDER — TIZANIDINE 4 MG/1
4 TABLET ORAL ONCE
Status: DISCONTINUED | OUTPATIENT
Start: 2021-01-01 | End: 2021-01-01

## 2021-01-01 RX ORDER — CEFDINIR 300 MG/1
300 CAPSULE ORAL 2 TIMES DAILY
Qty: 8 CAPSULE | Refills: 0 | Status: SHIPPED | OUTPATIENT
Start: 2021-01-01 | End: 2021-01-01

## 2021-01-01 RX ORDER — METHYLPREDNISOLONE 4 MG/1
TABLET ORAL
Qty: 1 KIT | Refills: 0 | Status: SHIPPED | OUTPATIENT
Start: 2021-01-01

## 2021-01-01 RX ORDER — APIXABAN 2.5 MG/1
TABLET, FILM COATED ORAL
Qty: 60 TABLET | Refills: 3 | Status: SHIPPED | OUTPATIENT
Start: 2021-01-01

## 2021-01-01 RX ORDER — CEFTRIAXONE 1 G/50ML
1 INJECTION, SOLUTION INTRAVENOUS EVERY 24 HOURS
Status: DISCONTINUED | OUTPATIENT
Start: 2021-01-01 | End: 2021-01-01

## 2021-01-01 RX ORDER — BUMETANIDE 0.25 MG/ML
1 INJECTION, SOLUTION INTRAMUSCULAR; INTRAVENOUS EVERY 12 HOURS
Status: DISCONTINUED | OUTPATIENT
Start: 2021-01-01 | End: 2021-01-01 | Stop reason: HOSPADM

## 2021-01-01 RX ORDER — BENZONATATE 100 MG/1
200 CAPSULE ORAL 3 TIMES DAILY PRN
Status: DISCONTINUED | OUTPATIENT
Start: 2021-01-01 | End: 2021-01-01 | Stop reason: HOSPADM

## 2021-01-01 RX ORDER — SODIUM CHLORIDE 9 MG/ML
INJECTION, SOLUTION INTRAVENOUS CONTINUOUS
Status: DISCONTINUED | OUTPATIENT
Start: 2021-01-01 | End: 2021-01-01

## 2021-01-01 RX ORDER — SODIUM CHLORIDE 9 MG/ML
INJECTION, SOLUTION INTRAVENOUS CONTINUOUS
Status: DISCONTINUED | OUTPATIENT
Start: 2021-01-01 | End: 2021-01-01 | Stop reason: HOSPADM

## 2021-01-01 RX ORDER — CEFTRIAXONE 1 G/50ML
1 INJECTION, SOLUTION INTRAVENOUS ONCE
Status: COMPLETED | OUTPATIENT
Start: 2021-01-01 | End: 2021-01-01

## 2021-01-01 RX ORDER — CETIRIZINE HYDROCHLORIDE 10 MG/1
10 TABLET ORAL DAILY
Status: DISCONTINUED | OUTPATIENT
Start: 2021-01-01 | End: 2021-01-01 | Stop reason: HOSPADM

## 2021-01-01 RX ORDER — BUMETANIDE 2 MG/1
TABLET ORAL
COMMUNITY
Start: 2021-01-01

## 2021-01-01 RX ORDER — BUMETANIDE 1 MG/1
1 TABLET ORAL DAILY
Status: DISCONTINUED | OUTPATIENT
Start: 2021-01-01 | End: 2021-01-01 | Stop reason: HOSPADM

## 2021-01-01 RX ORDER — LISINOPRIL 5 MG/1
TABLET ORAL
Qty: 90 TABLET | Refills: 3 | Status: SHIPPED | OUTPATIENT
Start: 2021-01-01 | End: 2022-01-01

## 2021-01-01 RX ORDER — METFORMIN HYDROCHLORIDE 500 MG/1
500 TABLET, EXTENDED RELEASE ORAL
Status: DISCONTINUED | OUTPATIENT
Start: 2021-01-01 | End: 2021-01-01 | Stop reason: HOSPADM

## 2021-01-01 RX ORDER — ATORVASTATIN CALCIUM 40 MG/1
40 TABLET, FILM COATED ORAL DAILY
Status: DISCONTINUED | OUTPATIENT
Start: 2021-01-01 | End: 2021-01-01 | Stop reason: HOSPADM

## 2021-01-01 RX ORDER — LORATADINE 10 MG/1
10 TABLET ORAL DAILY
Status: DISCONTINUED | OUTPATIENT
Start: 2021-01-01 | End: 2021-01-01 | Stop reason: CLARIF

## 2021-01-01 RX ORDER — BUMETANIDE 0.25 MG/ML
INJECTION, SOLUTION INTRAMUSCULAR; INTRAVENOUS
Status: COMPLETED
Start: 2021-01-01 | End: 2021-01-01

## 2021-01-01 RX ORDER — ERGOCALCIFEROL 1.25 MG/1
CAPSULE ORAL
Qty: 12 CAPSULE | Refills: 3 | Status: SHIPPED | OUTPATIENT
Start: 2021-01-01

## 2021-01-01 RX ORDER — SODIUM POLYSTYRENE SULFONATE 15 G/60ML
30 SUSPENSION ORAL; RECTAL ONCE
Status: COMPLETED | OUTPATIENT
Start: 2021-01-01 | End: 2021-01-01

## 2021-01-01 RX ORDER — LORATADINE 10 MG/1
TABLET ORAL
Qty: 30 TABLET | Refills: 5 | Status: SHIPPED | OUTPATIENT
Start: 2021-01-01 | End: 2021-01-01 | Stop reason: SDUPTHER

## 2021-01-01 RX ORDER — MONTELUKAST SODIUM 10 MG/1
10 TABLET ORAL NIGHTLY
Status: DISCONTINUED | OUTPATIENT
Start: 2021-01-01 | End: 2021-01-01 | Stop reason: HOSPADM

## 2021-01-01 RX ORDER — SODIUM CHLORIDE 0.9 % (FLUSH) 0.9 %
10 SYRINGE (ML) INJECTION AS NEEDED
Status: DISCONTINUED | OUTPATIENT
Start: 2021-01-01 | End: 2021-01-01 | Stop reason: HOSPADM

## 2021-01-01 RX ORDER — BUMETANIDE 1 MG/1
TABLET ORAL
Qty: 60 TABLET | Refills: 3 | Status: SHIPPED | OUTPATIENT
Start: 2021-01-01

## 2021-01-01 RX ORDER — DOCUSATE SODIUM 100 MG/1
100 CAPSULE, LIQUID FILLED ORAL 2 TIMES DAILY
Status: DISCONTINUED | OUTPATIENT
Start: 2021-01-01 | End: 2021-01-01 | Stop reason: HOSPADM

## 2021-01-01 RX ORDER — TIZANIDINE 4 MG/1
4 TABLET ORAL ONCE
Status: COMPLETED | OUTPATIENT
Start: 2021-01-01 | End: 2021-01-01

## 2021-01-01 RX ORDER — BUMETANIDE 0.25 MG/ML
2 INJECTION, SOLUTION INTRAMUSCULAR; INTRAVENOUS EVERY 12 HOURS
Status: DISCONTINUED | OUTPATIENT
Start: 2021-01-01 | End: 2021-01-01

## 2021-01-01 RX ORDER — PREGABALIN 75 MG/1
CAPSULE ORAL
Status: COMPLETED
Start: 2021-01-01 | End: 2021-01-01

## 2021-01-01 RX ORDER — BUMETANIDE 0.25 MG/ML
1 INJECTION, SOLUTION INTRAMUSCULAR; INTRAVENOUS ONCE
Status: COMPLETED | OUTPATIENT
Start: 2021-01-01 | End: 2021-01-01

## 2021-01-01 RX ORDER — PREDNISONE 20 MG/1
40 TABLET ORAL
Status: DISCONTINUED | OUTPATIENT
Start: 2021-01-01 | End: 2021-01-01 | Stop reason: HOSPADM

## 2021-01-01 RX ORDER — TIZANIDINE 4 MG/1
4 TABLET ORAL EVERY 6 HOURS PRN
Status: DISCONTINUED | OUTPATIENT
Start: 2021-01-01 | End: 2021-01-01

## 2021-01-01 RX ORDER — PREGABALIN 50 MG/1
100 CAPSULE ORAL EVERY 12 HOURS SCHEDULED
Status: DISCONTINUED | OUTPATIENT
Start: 2021-01-01 | End: 2021-01-01 | Stop reason: SDUPTHER

## 2021-01-01 RX ORDER — DOXYCYCLINE 100 MG/1
100 CAPSULE ORAL EVERY 12 HOURS SCHEDULED
Qty: 7 CAPSULE | Refills: 0 | Status: SHIPPED | OUTPATIENT
Start: 2021-01-01 | End: 2021-01-01

## 2021-01-01 RX ORDER — TIZANIDINE 4 MG/1
TABLET ORAL
Qty: 270 TABLET | Refills: 3 | Status: SHIPPED | OUTPATIENT
Start: 2021-01-01

## 2021-01-01 RX ORDER — GLIMEPIRIDE 4 MG/1
TABLET ORAL
Qty: 90 TABLET | Refills: 3 | Status: SHIPPED | OUTPATIENT
Start: 2021-01-01

## 2021-01-01 RX ORDER — DEXAMETHASONE SODIUM PHOSPHATE 10 MG/ML
10 INJECTION INTRAMUSCULAR; INTRAVENOUS ONCE
Status: COMPLETED | OUTPATIENT
Start: 2021-01-01 | End: 2021-01-01

## 2021-01-01 RX ORDER — CALCIUM GLUCONATE 94 MG/ML
INJECTION, SOLUTION INTRAVENOUS
Status: DISPENSED
Start: 2021-01-01 | End: 2021-01-01

## 2021-01-01 RX ORDER — FUROSEMIDE 10 MG/ML
40 INJECTION INTRAMUSCULAR; INTRAVENOUS ONCE
Status: COMPLETED | OUTPATIENT
Start: 2021-01-01 | End: 2021-01-01

## 2021-01-01 RX ORDER — BENZONATATE 200 MG/1
200 CAPSULE ORAL 3 TIMES DAILY PRN
COMMUNITY
Start: 2021-01-01 | End: 2021-01-01

## 2021-01-01 RX ORDER — BENZONATATE 200 MG/1
200 CAPSULE ORAL 3 TIMES DAILY PRN
Qty: 21 CAPSULE | Refills: 0 | Status: SHIPPED | OUTPATIENT
Start: 2021-01-01 | End: 2021-01-01

## 2021-01-01 RX ORDER — AMOXICILLIN 875 MG/1
875 TABLET, COATED ORAL 2 TIMES DAILY
Qty: 20 TABLET | Refills: 0 | Status: SHIPPED | OUTPATIENT
Start: 2021-01-01 | End: 2021-01-01

## 2021-01-01 RX ORDER — METHYLPREDNISOLONE SODIUM SUCCINATE 125 MG/2ML
60 INJECTION, POWDER, LYOPHILIZED, FOR SOLUTION INTRAMUSCULAR; INTRAVENOUS EVERY 8 HOURS
Status: DISCONTINUED | OUTPATIENT
Start: 2021-01-01 | End: 2021-01-01

## 2021-01-01 RX ORDER — TIZANIDINE 4 MG/1
4 TABLET ORAL EVERY 8 HOURS PRN
Status: DISCONTINUED | OUTPATIENT
Start: 2021-01-01 | End: 2021-01-01 | Stop reason: HOSPADM

## 2021-01-01 RX ORDER — BUMETANIDE 1 MG/1
TABLET ORAL
Qty: 90 TABLET | Refills: 3 | Status: SHIPPED | OUTPATIENT
Start: 2021-01-01 | End: 2021-01-01 | Stop reason: SDUPTHER

## 2021-01-01 RX ORDER — DOCUSATE SODIUM 100 MG/1
CAPSULE, LIQUID FILLED ORAL
Qty: 60 CAPSULE | Refills: 5 | Status: SHIPPED | OUTPATIENT
Start: 2021-01-01 | End: 2021-01-01 | Stop reason: SDUPTHER

## 2021-01-01 RX ADMIN — HYDROCODONE BITARTRATE AND ACETAMINOPHEN 1 TABLET: 7.5; 325 TABLET ORAL at 12:54

## 2021-01-01 RX ADMIN — BUMETANIDE 1 MG: 0.25 INJECTION, SOLUTION INTRAMUSCULAR; INTRAVENOUS at 15:40

## 2021-01-01 RX ADMIN — PREGABALIN 100 MG: 50 CAPSULE ORAL at 09:40

## 2021-01-01 RX ADMIN — HYDROCODONE BITARTRATE AND ACETAMINOPHEN 1 TABLET: 7.5; 325 TABLET ORAL at 12:46

## 2021-01-01 RX ADMIN — APIXABAN 2.5 MG: 2.5 TABLET, FILM COATED ORAL at 04:04

## 2021-01-01 RX ADMIN — PREGABALIN 150 MG: 75 CAPSULE ORAL at 08:21

## 2021-01-01 RX ADMIN — SODIUM CHLORIDE, PRESERVATIVE FREE 10 ML: 5 INJECTION INTRAVENOUS at 08:20

## 2021-01-01 RX ADMIN — Medication 95 MG: at 07:01

## 2021-01-01 RX ADMIN — METHYLPREDNISOLONE SODIUM SUCCINATE 125 MG: 125 INJECTION, POWDER, FOR SOLUTION INTRAMUSCULAR; INTRAVENOUS at 23:09

## 2021-01-01 RX ADMIN — SODIUM CHLORIDE, PRESERVATIVE FREE 10 ML: 5 INJECTION INTRAVENOUS at 23:05

## 2021-01-01 RX ADMIN — PREGABALIN 100 MG: 50 CAPSULE ORAL at 21:00

## 2021-01-01 RX ADMIN — HYDROCODONE BITARTRATE AND ACETAMINOPHEN 1 TABLET: 7.5; 325 TABLET ORAL at 13:09

## 2021-01-01 RX ADMIN — METOPROLOL TARTRATE 25 MG: 25 TABLET, FILM COATED ORAL at 09:40

## 2021-01-01 RX ADMIN — PREGABALIN 100 MG: 50 CAPSULE ORAL at 08:33

## 2021-01-01 RX ADMIN — TIZANIDINE 4 MG: 4 TABLET ORAL at 21:00

## 2021-01-01 RX ADMIN — APIXABAN 2.5 MG: 2.5 TABLET, FILM COATED ORAL at 15:01

## 2021-01-01 RX ADMIN — SODIUM POLYSTYRENE SULFONATE 30 G: 15 SUSPENSION ORAL; RECTAL at 21:33

## 2021-01-01 RX ADMIN — HYDROCODONE BITARTRATE AND ACETAMINOPHEN 1 TABLET: 7.5; 325 TABLET ORAL at 21:33

## 2021-01-01 RX ADMIN — BENZONATATE 200 MG: 100 CAPSULE ORAL at 15:17

## 2021-01-01 RX ADMIN — ALBUTEROL SULFATE 6 PUFF: 90 AEROSOL, METERED RESPIRATORY (INHALATION) at 23:09

## 2021-01-01 RX ADMIN — METOPROLOL TARTRATE 12.5 MG: 25 TABLET, FILM COATED ORAL at 21:00

## 2021-01-01 RX ADMIN — METFORMIN HYDROCHLORIDE 500 MG: 500 TABLET, EXTENDED RELEASE ORAL at 08:22

## 2021-01-01 RX ADMIN — FUROSEMIDE 20 MG: 10 INJECTION, SOLUTION INTRAMUSCULAR; INTRAVENOUS at 17:06

## 2021-01-01 RX ADMIN — FERROUS SULFATE TAB EC 324 MG (65 MG FE EQUIVALENT) 324 MG: 324 (65 FE) TABLET DELAYED RESPONSE at 08:22

## 2021-01-01 RX ADMIN — SODIUM CHLORIDE, PRESERVATIVE FREE 10 ML: 5 INJECTION INTRAVENOUS at 21:00

## 2021-01-01 RX ADMIN — FERROUS SULFATE TAB EC 324 MG (65 MG FE EQUIVALENT) 324 MG: 324 (65 FE) TABLET DELAYED RESPONSE at 09:14

## 2021-01-01 RX ADMIN — PREDNISONE 40 MG: 20 TABLET ORAL at 08:21

## 2021-01-01 RX ADMIN — LISINOPRIL 5 MG: 5 TABLET ORAL at 08:34

## 2021-01-01 RX ADMIN — BUMETANIDE 1 MG: 0.25 INJECTION INTRAMUSCULAR; INTRAVENOUS at 15:40

## 2021-01-01 RX ADMIN — METOPROLOL TARTRATE 12.5 MG: 25 TABLET, FILM COATED ORAL at 08:34

## 2021-01-01 RX ADMIN — APIXABAN 2.5 MG: 2.5 TABLET, FILM COATED ORAL at 16:40

## 2021-01-01 RX ADMIN — FUROSEMIDE 40 MG: 10 INJECTION, SOLUTION INTRAMUSCULAR; INTRAVENOUS at 09:41

## 2021-01-01 RX ADMIN — FUROSEMIDE 40 MG: 10 INJECTION, SOLUTION INTRAMUSCULAR; INTRAVENOUS at 21:15

## 2021-01-01 RX ADMIN — HYDROCODONE BITARTRATE AND ACETAMINOPHEN 1 TABLET: 7.5; 325 TABLET ORAL at 05:45

## 2021-01-01 RX ADMIN — APIXABAN 2.5 MG: 2.5 TABLET, FILM COATED ORAL at 03:50

## 2021-01-01 RX ADMIN — METHYLPREDNISOLONE SODIUM SUCCINATE 60 MG: 125 INJECTION, POWDER, FOR SOLUTION INTRAMUSCULAR; INTRAVENOUS at 16:40

## 2021-01-01 RX ADMIN — INSULIN HUMAN 8 UNITS: 100 INJECTION, SOLUTION PARENTERAL at 20:44

## 2021-01-01 RX ADMIN — METFORMIN HYDROCHLORIDE 500 MG: 500 TABLET, EXTENDED RELEASE ORAL at 09:15

## 2021-01-01 RX ADMIN — ATORVASTATIN CALCIUM 40 MG: 40 TABLET, FILM COATED ORAL at 09:40

## 2021-01-01 RX ADMIN — TIZANIDINE 4 MG: 4 TABLET ORAL at 02:40

## 2021-01-01 RX ADMIN — APIXABAN 2.5 MG: 2.5 TABLET, FILM COATED ORAL at 15:17

## 2021-01-01 RX ADMIN — DOXYCYCLINE 100 MG: 100 CAPSULE ORAL at 09:15

## 2021-01-01 RX ADMIN — SODIUM CHLORIDE: 9 INJECTION, SOLUTION INTRAVENOUS at 02:34

## 2021-01-01 RX ADMIN — METHYLPREDNISOLONE SODIUM SUCCINATE 60 MG: 125 INJECTION, POWDER, FOR SOLUTION INTRAMUSCULAR; INTRAVENOUS at 23:05

## 2021-01-01 RX ADMIN — SODIUM CHLORIDE, PRESERVATIVE FREE 10 ML: 5 INJECTION INTRAVENOUS at 09:15

## 2021-01-01 RX ADMIN — HYDROCODONE BITARTRATE AND ACETAMINOPHEN 1 TABLET: 7.5; 325 TABLET ORAL at 02:40

## 2021-01-01 RX ADMIN — PREGABALIN 100 MG: 50 CAPSULE ORAL at 20:58

## 2021-01-01 RX ADMIN — FUROSEMIDE 20 MG: 10 INJECTION, SOLUTION INTRAMUSCULAR; INTRAVENOUS at 05:33

## 2021-01-01 RX ADMIN — IPRATROPIUM BROMIDE AND ALBUTEROL SULFATE 1 AMPULE: .5; 3 SOLUTION RESPIRATORY (INHALATION) at 09:03

## 2021-01-01 RX ADMIN — SODIUM CHLORIDE, PRESERVATIVE FREE 10 ML: 5 INJECTION INTRAVENOUS at 06:40

## 2021-01-01 RX ADMIN — HYDROCODONE BITARTRATE AND ACETAMINOPHEN 1 TABLET: 7.5; 325 TABLET ORAL at 08:33

## 2021-01-01 RX ADMIN — MONTELUKAST 10 MG: 10 TABLET, FILM COATED ORAL at 21:00

## 2021-01-01 RX ADMIN — HYDROCODONE BITARTRATE AND ACETAMINOPHEN 1 TABLET: 7.5; 325 TABLET ORAL at 19:45

## 2021-01-01 RX ADMIN — DOXYCYCLINE 100 MG: 100 CAPSULE ORAL at 08:33

## 2021-01-01 RX ADMIN — PANTOPRAZOLE SODIUM 40 MG: 40 TABLET, DELAYED RELEASE ORAL at 06:39

## 2021-01-01 RX ADMIN — FERROUS SULFATE TAB EC 324 MG (65 MG FE EQUIVALENT) 325 MG: 324 (65 FE) TABLET DELAYED RESPONSE at 08:33

## 2021-01-01 RX ADMIN — FUROSEMIDE 40 MG: 10 INJECTION, SOLUTION INTRAMUSCULAR; INTRAVENOUS at 21:16

## 2021-01-01 RX ADMIN — METHYLPREDNISOLONE SODIUM SUCCINATE 60 MG: 125 INJECTION, POWDER, FOR SOLUTION INTRAMUSCULAR; INTRAVENOUS at 15:01

## 2021-01-01 RX ADMIN — SODIUM CHLORIDE, PRESERVATIVE FREE 10 ML: 5 INJECTION INTRAVENOUS at 09:41

## 2021-01-01 RX ADMIN — FUROSEMIDE 40 MG: 10 INJECTION, SOLUTION INTRAMUSCULAR; INTRAVENOUS at 09:16

## 2021-01-01 RX ADMIN — CEFTRIAXONE 2 G: 2 INJECTION, SOLUTION INTRAVENOUS at 23:00

## 2021-01-01 RX ADMIN — CALCIUM GLUCONATE 1000 MG: 98 INJECTION, SOLUTION INTRAVENOUS at 20:58

## 2021-01-01 RX ADMIN — PANTOPRAZOLE SODIUM 40 MG: 40 TABLET, DELAYED RELEASE ORAL at 05:45

## 2021-01-01 RX ADMIN — DOXYCYCLINE 100 MG: 100 CAPSULE ORAL at 09:40

## 2021-01-01 RX ADMIN — SODIUM CHLORIDE, PRESERVATIVE FREE 10 ML: 5 INJECTION INTRAVENOUS at 08:33

## 2021-01-01 RX ADMIN — APIXABAN 2.5 MG: 2.5 TABLET, FILM COATED ORAL at 13:10

## 2021-01-01 RX ADMIN — FUROSEMIDE 80 MG: 10 INJECTION, SOLUTION INTRAVENOUS at 00:50

## 2021-01-01 RX ADMIN — FUROSEMIDE 40 MG: 10 INJECTION, SOLUTION INTRAMUSCULAR; INTRAVENOUS at 08:39

## 2021-01-01 RX ADMIN — CEFTRIAXONE 1 G: 1 INJECTION, SOLUTION INTRAVENOUS at 00:53

## 2021-01-01 RX ADMIN — TIZANIDINE 4 MG: 4 TABLET ORAL at 15:01

## 2021-01-01 RX ADMIN — IPRATROPIUM BROMIDE AND ALBUTEROL SULFATE 1 AMPULE: .5; 3 SOLUTION RESPIRATORY (INHALATION) at 13:16

## 2021-01-01 RX ADMIN — TIZANIDINE 4 MG: 4 TABLET ORAL at 00:36

## 2021-01-01 RX ADMIN — SODIUM CHLORIDE, PRESERVATIVE FREE 10 ML: 5 INJECTION INTRAVENOUS at 23:00

## 2021-01-01 RX ADMIN — LISINOPRIL 5 MG: 5 TABLET ORAL at 09:40

## 2021-01-01 RX ADMIN — VANCOMYCIN HYDROCHLORIDE 1250 MG: 5 INJECTION, POWDER, LYOPHILIZED, FOR SOLUTION INTRAVENOUS at 05:28

## 2021-01-01 RX ADMIN — DEXAMETHASONE SODIUM PHOSPHATE 10 MG: 10 INJECTION INTRAMUSCULAR; INTRAVENOUS at 08:58

## 2021-01-01 RX ADMIN — APIXABAN 2.5 MG: 2.5 TABLET, FILM COATED ORAL at 04:15

## 2021-01-01 RX ADMIN — DOCUSATE SODIUM 100 MG: 100 CAPSULE, LIQUID FILLED ORAL at 13:09

## 2021-01-01 RX ADMIN — HYDROCODONE BITARTRATE AND ACETAMINOPHEN 1 TABLET: 7.5; 325 TABLET ORAL at 21:00

## 2021-01-01 RX ADMIN — DOXYCYCLINE 100 MG: 100 CAPSULE ORAL at 21:00

## 2021-01-01 RX ADMIN — CETIRIZINE HYDROCHLORIDE 10 MG: 10 TABLET, FILM COATED ORAL at 13:30

## 2021-01-01 RX ADMIN — LISINOPRIL 5 MG: 5 TABLET ORAL at 08:21

## 2021-01-01 RX ADMIN — METOPROLOL TARTRATE 12.5 MG: 25 TABLET, FILM COATED ORAL at 20:59

## 2021-01-01 RX ADMIN — PREGABALIN 150 MG: 75 CAPSULE ORAL at 13:31

## 2021-01-01 RX ADMIN — TIZANIDINE 4 MG: 4 TABLET ORAL at 20:58

## 2021-01-01 RX ADMIN — PANTOPRAZOLE SODIUM 40 MG: 40 TABLET, DELAYED RELEASE ORAL at 06:40

## 2021-01-01 RX ADMIN — HYDROCODONE BITARTRATE AND ACETAMINOPHEN 1 TABLET: 7.5; 325 TABLET ORAL at 04:04

## 2021-01-01 RX ADMIN — ATORVASTATIN CALCIUM 40 MG: 40 TABLET, FILM COATED ORAL at 09:14

## 2021-01-01 RX ADMIN — SODIUM CHLORIDE, PRESERVATIVE FREE 10 ML: 5 INJECTION INTRAVENOUS at 21:22

## 2021-01-01 RX ADMIN — PREGABALIN 100 MG: 50 CAPSULE ORAL at 09:14

## 2021-01-01 RX ADMIN — PREGABALIN 150 MG: 75 CAPSULE ORAL at 21:00

## 2021-01-01 RX ADMIN — BENZONATATE 200 MG: 100 CAPSULE ORAL at 04:20

## 2021-01-01 RX ADMIN — ATORVASTATIN CALCIUM 40 MG: 40 TABLET, FILM COATED ORAL at 08:22

## 2021-01-01 RX ADMIN — METHYLPREDNISOLONE SODIUM SUCCINATE 60 MG: 125 INJECTION, POWDER, FOR SOLUTION INTRAMUSCULAR; INTRAVENOUS at 06:39

## 2021-01-01 RX ADMIN — DEXTROSE MONOHYDRATE 25 G: 25 INJECTION, SOLUTION INTRAVENOUS at 20:44

## 2021-01-01 RX ADMIN — DOXYCYCLINE 100 MG: 100 CAPSULE ORAL at 08:20

## 2021-01-01 RX ADMIN — METFORMIN HYDROCHLORIDE 500 MG: 500 TABLET, EXTENDED RELEASE ORAL at 09:40

## 2021-01-01 RX ADMIN — METFORMIN HYDROCHLORIDE 500 MG: 500 TABLET, EXTENDED RELEASE ORAL at 08:33

## 2021-01-01 RX ADMIN — CEFTRIAXONE 2 G: 2 INJECTION, SOLUTION INTRAVENOUS at 22:11

## 2021-01-01 RX ADMIN — FERROUS SULFATE TAB EC 324 MG (65 MG FE EQUIVALENT) 325 MG: 324 (65 FE) TABLET DELAYED RESPONSE at 09:40

## 2021-01-01 RX ADMIN — ATORVASTATIN CALCIUM 40 MG: 40 TABLET, FILM COATED ORAL at 08:34

## 2021-01-01 RX ADMIN — BUMETANIDE 1 MG: 0.25 INJECTION, SOLUTION INTRAMUSCULAR; INTRAVENOUS at 13:13

## 2021-01-01 RX ADMIN — METHYLPREDNISOLONE SODIUM SUCCINATE 60 MG: 125 INJECTION, POWDER, FOR SOLUTION INTRAMUSCULAR; INTRAVENOUS at 06:41

## 2021-01-01 RX ADMIN — PANTOPRAZOLE SODIUM 40 MG: 40 TABLET, DELAYED RELEASE ORAL at 06:41

## 2021-01-01 RX ADMIN — BUMETANIDE 1 MG: 1 TABLET ORAL at 08:22

## 2021-01-01 RX ADMIN — APIXABAN 2.5 MG: 2.5 TABLET, FILM COATED ORAL at 03:52

## 2021-01-01 RX ADMIN — FUROSEMIDE 40 MG: 10 INJECTION, SOLUTION INTRAMUSCULAR; INTRAVENOUS at 18:37

## 2021-01-01 RX ADMIN — VANCOMYCIN HYDROCHLORIDE 2000 MG: 5 INJECTION, POWDER, LYOPHILIZED, FOR SOLUTION INTRAVENOUS at 04:04

## 2021-01-01 RX ADMIN — METHYLPREDNISOLONE SODIUM SUCCINATE 60 MG: 125 INJECTION, POWDER, FOR SOLUTION INTRAMUSCULAR; INTRAVENOUS at 06:40

## 2021-01-01 ASSESSMENT — ENCOUNTER SYMPTOMS
SORE THROAT: 0
NAUSEA: 0
NAUSEA: 0
ABDOMINAL PAIN: 0
SHORTNESS OF BREATH: 0
ABDOMINAL PAIN: 0
COLOR CHANGE: 0
COUGH: 1
VOMITING: 0
FACIAL SWELLING: 0
GASTROINTESTINAL NEGATIVE: 1
TROUBLE SWALLOWING: 0
COUGH: 0
EYE PAIN: 0
SHORTNESS OF BREATH: 0
SORE THROAT: 0
SHORTNESS OF BREATH: 0
EYE PAIN: 0
VOMITING: 0
EYE PAIN: 0
VOMITING: 0
COUGH: 0
SORE THROAT: 0
WHEEZING: 1
NAUSEA: 0
SINUS PRESSURE: 0
SHORTNESS OF BREATH: 1
COUGH: 0
SORE THROAT: 0
BACK PAIN: 1
ABDOMINAL PAIN: 0
EYES NEGATIVE: 1

## 2021-01-01 ASSESSMENT — PAIN DESCRIPTION - FREQUENCY: FREQUENCY: CONTINUOUS

## 2021-01-01 ASSESSMENT — PAIN DESCRIPTION - LOCATION: LOCATION: BACK

## 2021-01-01 ASSESSMENT — PAIN SCALES - GENERAL
PAINLEVEL_OUTOF10: 7

## 2021-01-01 ASSESSMENT — PAIN DESCRIPTION - PAIN TYPE: TYPE: CHRONIC PAIN

## 2021-01-14 DIAGNOSIS — M19.90 ARTHRITIS: ICD-10-CM

## 2021-01-14 DIAGNOSIS — G89.29 CHRONIC BACK PAIN, UNSPECIFIED BACK LOCATION, UNSPECIFIED BACK PAIN LATERALITY: ICD-10-CM

## 2021-01-14 DIAGNOSIS — M54.9 CHRONIC BACK PAIN, UNSPECIFIED BACK LOCATION, UNSPECIFIED BACK PAIN LATERALITY: ICD-10-CM

## 2021-01-14 RX ORDER — HYDROCODONE BITARTRATE AND ACETAMINOPHEN 7.5; 325 MG/1; MG/1
1 TABLET ORAL EVERY 8 HOURS PRN
Qty: 90 TABLET | Refills: 0 | Status: SHIPPED | OUTPATIENT
Start: 2021-01-14 | End: 2021-02-09 | Stop reason: SDUPTHER

## 2021-01-19 DIAGNOSIS — L03.116 CELLULITIS OF LEFT LOWER EXTREMITY: ICD-10-CM

## 2021-01-27 RX ORDER — UMECLIDINIUM BROMIDE AND VILANTEROL TRIFENATATE 62.5; 25 UG/1; UG/1
1 POWDER RESPIRATORY (INHALATION) DAILY
Qty: 1 EACH | Refills: 5 | Status: SHIPPED | OUTPATIENT
Start: 2021-01-27

## 2021-02-04 RX ORDER — OMEPRAZOLE 40 MG/1
CAPSULE, DELAYED RELEASE ORAL
Qty: 30 CAPSULE | Refills: 5 | Status: SHIPPED | OUTPATIENT
Start: 2021-02-04 | End: 2021-01-01

## 2021-02-09 DIAGNOSIS — M54.9 CHRONIC BACK PAIN, UNSPECIFIED BACK LOCATION, UNSPECIFIED BACK PAIN LATERALITY: ICD-10-CM

## 2021-02-09 DIAGNOSIS — M19.90 ARTHRITIS: ICD-10-CM

## 2021-02-09 DIAGNOSIS — G89.29 CHRONIC BACK PAIN, UNSPECIFIED BACK LOCATION, UNSPECIFIED BACK PAIN LATERALITY: ICD-10-CM

## 2021-02-09 RX ORDER — HYDROCODONE BITARTRATE AND ACETAMINOPHEN 7.5; 325 MG/1; MG/1
1 TABLET ORAL EVERY 8 HOURS PRN
Qty: 90 TABLET | Refills: 0 | Status: SHIPPED | OUTPATIENT
Start: 2021-02-09 | End: 2021-03-09 | Stop reason: SDUPTHER

## 2021-02-19 DIAGNOSIS — M25.561 PAIN AND SWELLING OF RIGHT KNEE: ICD-10-CM

## 2021-02-19 DIAGNOSIS — M25.461 PAIN AND SWELLING OF RIGHT KNEE: ICD-10-CM

## 2021-02-19 RX ORDER — IBUPROFEN 800 MG/1
800 TABLET ORAL 2 TIMES DAILY PRN
Qty: 60 TABLET | Refills: 1 | OUTPATIENT
Start: 2021-02-19

## 2021-02-25 ENCOUNTER — OFFICE VISIT (OUTPATIENT)
Dept: PRIMARY CARE CLINIC | Age: 56
End: 2021-02-25
Payer: MEDICAID

## 2021-02-25 VITALS
HEART RATE: 92 BPM | SYSTOLIC BLOOD PRESSURE: 110 MMHG | WEIGHT: 210 LBS | RESPIRATION RATE: 16 BRPM | HEIGHT: 64 IN | TEMPERATURE: 96.8 F | BODY MASS INDEX: 35.85 KG/M2 | DIASTOLIC BLOOD PRESSURE: 60 MMHG | OXYGEN SATURATION: 90 %

## 2021-02-25 DIAGNOSIS — I10 ESSENTIAL HYPERTENSION: ICD-10-CM

## 2021-02-25 DIAGNOSIS — G89.29 CHRONIC NECK AND BACK PAIN: ICD-10-CM

## 2021-02-25 DIAGNOSIS — J30.9 ALLERGIC RHINITIS, UNSPECIFIED SEASONALITY, UNSPECIFIED TRIGGER: ICD-10-CM

## 2021-02-25 DIAGNOSIS — M54.9 CHRONIC NECK AND BACK PAIN: ICD-10-CM

## 2021-02-25 DIAGNOSIS — E78.5 HYPERLIPIDEMIA, UNSPECIFIED HYPERLIPIDEMIA TYPE: ICD-10-CM

## 2021-02-25 DIAGNOSIS — G57.90 NEUROPATHY OF FOOT, UNSPECIFIED LATERALITY: ICD-10-CM

## 2021-02-25 DIAGNOSIS — E11.65 TYPE 2 DIABETES MELLITUS WITH HYPERGLYCEMIA, WITHOUT LONG-TERM CURRENT USE OF INSULIN (HCC): Primary | ICD-10-CM

## 2021-02-25 DIAGNOSIS — M54.2 CHRONIC NECK AND BACK PAIN: ICD-10-CM

## 2021-02-25 PROCEDURE — 99214 OFFICE O/P EST MOD 30 MIN: CPT | Performed by: NURSE PRACTITIONER

## 2021-02-25 PROCEDURE — 3051F HG A1C>EQUAL 7.0%<8.0%: CPT | Performed by: NURSE PRACTITIONER

## 2021-02-25 RX ORDER — PREGABALIN 100 MG/1
CAPSULE ORAL
Qty: 60 CAPSULE | Refills: 5 | Status: SHIPPED | OUTPATIENT
Start: 2021-02-25 | End: 2021-01-01 | Stop reason: SDUPTHER

## 2021-02-25 RX ORDER — ATORVASTATIN CALCIUM 40 MG/1
TABLET, FILM COATED ORAL
Qty: 30 TABLET | Refills: 5 | Status: SHIPPED | OUTPATIENT
Start: 2021-02-25

## 2021-02-25 RX ORDER — FLUTICASONE PROPIONATE 50 MCG
2 SPRAY, SUSPENSION (ML) NASAL DAILY
Qty: 1 BOTTLE | Refills: 5 | Status: SHIPPED | OUTPATIENT
Start: 2021-02-25 | End: 2021-01-01

## 2021-02-25 RX ORDER — FEXOFENADINE HCL 180 MG/1
180 TABLET ORAL DAILY
Qty: 30 TABLET | Refills: 5 | Status: SHIPPED | OUTPATIENT
Start: 2021-02-25 | End: 2021-01-01

## 2021-02-25 RX ORDER — GLIMEPIRIDE 4 MG/1
TABLET ORAL
Qty: 30 TABLET | Refills: 5 | Status: SHIPPED | OUTPATIENT
Start: 2021-02-25 | End: 2021-01-01

## 2021-02-25 RX ORDER — BUMETANIDE 1 MG/1
TABLET ORAL
Qty: 30 TABLET | Refills: 5 | Status: SHIPPED | OUTPATIENT
Start: 2021-02-25 | End: 2021-01-01 | Stop reason: SDUPTHER

## 2021-02-25 RX ORDER — LISINOPRIL 5 MG/1
TABLET ORAL
Qty: 30 TABLET | Refills: 5 | Status: SHIPPED | OUTPATIENT
Start: 2021-02-25 | End: 2021-01-01 | Stop reason: SDUPTHER

## 2021-02-25 ASSESSMENT — ENCOUNTER SYMPTOMS
EYE DISCHARGE: 0
CONSTIPATION: 0
EYE ITCHING: 0
VOMITING: 0
COUGH: 0
SORE THROAT: 0
DIARRHEA: 0
ABDOMINAL PAIN: 0
RHINORRHEA: 0
NAUSEA: 0
SHORTNESS OF BREATH: 0
EYE REDNESS: 0

## 2021-02-25 NOTE — PROGRESS NOTES
Have you seen any other physician or provider since your last visit? No    Have you had any other diagnostic tests since your last visit? No    Have you changed or stopped any medications since your last visit? No    SUBJECTIVE:    Patient ID: Shahrzad Cerrato is a 60 y.o. female. Medical History Review  Past Medical, Family, and Social History reviewed. Health Maintenance Due   Topic Date Due    Hepatitis C screen  Never done    HIV screen  Never done    COVID-19 Vaccine (1) Never done    Hepatitis B vaccine (1 of 3 - Risk 3-dose series) Never done    Shingles Vaccine (1 of 2) Never done    Diabetic retinal exam  05/10/2017    Cervical cancer screen  08/21/2017    Diabetic foot exam  03/09/2018    Diabetic microalbuminuria test  03/20/2021       HPI:   Chief Complaint   Patient presents with    Diabetes    Hypertension     Pt is here today for a f/u on DM and HTN. FBS: 100-130      Patient's medications, allergies, past medical, surgical, social and family histories were reviewed and updated as appropriate. Review of Systems   Constitutional: Negative for chills, fatigue and fever. HENT: Negative for congestion, ear pain, rhinorrhea and sore throat. Eyes: Negative for discharge, redness and itching. Respiratory: Negative for cough and shortness of breath. Cardiovascular: Negative for chest pain, palpitations and leg swelling. Gastrointestinal: Negative for abdominal pain, constipation, diarrhea, nausea and vomiting. Endocrine: Negative for cold intolerance and heat intolerance. Genitourinary: Negative for dysuria. Musculoskeletal: Negative for arthralgias and joint swelling. Skin: Negative for rash and wound. Neurological: Negative for weakness and headaches. Hematological: Negative for adenopathy. Psychiatric/Behavioral: Negative for dysphoric mood and sleep disturbance. The patient is not nervous/anxious. Reviewed and acurate. See MA note.     OBJECTIVE:  BP 110/60 (Site: Right Lower Arm, Position: Sitting)   Pulse 92   Temp 96.8 °F (36 °C) (Temporal)   Resp 16   Ht 5' 4\" (1.626 m)   Wt 210 lb (95.3 kg)   SpO2 90% Comment: room air  BMI 36.05 kg/m²    Physical Exam  Constitutional:       Appearance: She is well-developed. HENT:      Head: Normocephalic and atraumatic. Right Ear: External ear normal.      Left Ear: External ear normal.   Eyes:      Conjunctiva/sclera: Conjunctivae normal.      Pupils: Pupils are equal, round, and reactive to light. Neck:      Musculoskeletal: Neck supple. Thyroid: No thyromegaly. Vascular: No JVD. Cardiovascular:      Rate and Rhythm: Normal rate and regular rhythm. Heart sounds: Normal heart sounds. No murmur. No friction rub. No gallop. Pulmonary:      Effort: Pulmonary effort is normal. No respiratory distress. Breath sounds: Normal breath sounds. Abdominal:      General: Bowel sounds are normal. There is no distension. Palpations: Abdomen is soft. Tenderness: There is no abdominal tenderness. Musculoskeletal: Normal range of motion. Lymphadenopathy:      Cervical: No cervical adenopathy. Skin:     General: Skin is warm and dry. Neurological:      Mental Status: She is alert and oriented to person, place, and time. Cranial Nerves: No cranial nerve deficit.          Results in Past 30 Days  Result Component Current Result Ref Range Previous Result Ref Range   Albumin/Globulin Ratio 1.3 (3/12/2021) 0.8 - 2.0 Not in Time Range    Albumin 4.1 (3/12/2021) 3.4 - 4.8 g/dL Not in Time Range    Alkaline Phosphatase 102 (H) (3/12/2021) 25 - 100 U/L Not in Time Range    ALT 29 (3/12/2021) 4 - 36 U/L Not in Time Range    AST 25 (3/12/2021) 8 - 33 U/L Not in Time Range    BUN 23 (H) (3/12/2021) 6 - 20 mg/dL Not in Time Range    Calcium 10.0 (3/12/2021) 8.5 - 10.5 mg/dL Not in Time Range    Chloride 100 (3/12/2021) 98 - 107 mmol/L Not in Time Range    CO2 29 (3/12/2021) 20 - 30 mmol/L Not in Time Range    CREATININE 1.4 (H) (3/12/2021) 0.4 - 1.2 mg/dL Not in Time Range    GFR  47 (L) (3/12/2021) >59 Not in Time Range    GFR Non- 39 (L) (3/12/2021) >59 Not in Time Range    Globulin 3.1 (3/12/2021) g/dL Not in Time Range    Glucose 126 (H) (3/12/2021) 74 - 106 mg/dL Not in Time Range    Potassium 4.5 (3/12/2021) 3.4 - 5.1 mmol/L Not in Time Range    Sodium 140 (3/12/2021) 136 - 145 mmol/L Not in Time Range    Total Bilirubin 0.9 (3/12/2021) 0.3 - 1.2 mg/dL Not in Time Range    Total Protein 7.2 (3/12/2021) 6.4 - 8.3 g/dL Not in Time Range      Hemoglobin A1C (%)   Date Value   03/12/2021 7.0 (H)     Microscopic Examination (no units)   Date Value   04/13/2015 YES     Microalbumin, Random Urine (mg/dL)   Date Value   03/09/2018 <1.20     LDL Calculated (mg/dL)   Date Value   03/12/2021 42       Lab Results   Component Value Date    WBC 6.5 03/12/2021    NEUTROABS 4.0 03/12/2021    HGB 14.0 03/12/2021    HCT 45.0 03/12/2021    .7 (H) 03/12/2021     03/12/2021     Lab Results   Component Value Date    TSH 3.93 04/13/2015       Prior to Visit Medications    Medication Sig Taking?  Authorizing Provider   pregabalin (LYRICA) 100 MG capsule TAKE 1 CAPSULE BY MOUTH TWICE DAILY Yes Teryl Katherine APRN   glimepiride (AMARYL) 4 MG tablet take 1 tablet by mouth every morning BEFORE BREAKFAST Yes Teryl Katherine, APRN   lisinopril (PRINIVIL;ZESTRIL) 5 MG tablet TAKE 1 TABLET BY MOUTH ONCE DAILY Yes Teryl Katherine, APRN   bumetanide (BUMEX) 1 MG tablet TAKE 1 TABLET BY MOUTH EVERY DAY Yes Teryl Katherine APRN   atorvastatin (LIPITOR) 40 MG tablet TAKE 1 TABLET BY MOUTH ONCE DAILY Yes Teryl Katherine, APRN   fluticasone (FLONASE) 50 MCG/ACT nasal spray 2 sprays by Each Nostril route daily Yes Teryl Katherine, APRN   fexofenadine (ALLEGRA) 180 MG tablet Take 1 tablet by mouth daily For allergies in place of Claritin Yes Teryl Katherine, APRN   omeprazole (PRILOSEC) 40 MG delayed release capsule TAKE 1 CAPSULE BY MOUTH EVERY MORNING BEFORE BREAKFAST Yes Priscella ISIDORO GarcíaN   umeclidinium-vilanterol (ANORO ELLIPTA) 62.5-25 MCG/INH AEPB inhaler Inhale 1 puff into the lungs daily Yes Priscella ISIDORO GarcíaN   Lancets (ONETOUCH DELICA PLUS [id number]) [address] TEST TWICE DAILY Yes Priscella ISIDORO GarcíaN   ELIQUIS 2.5 MG TABS tablet TAKE 1 TABLET BY MOUTH EVERY 12 HOURS Yes Priscella Raquel APRN   ONETOUCH ULTRA strip USE TO TEST BLOOD SUGAR TWICE A DAY Yes Priscella Raquel APRN   montelukast (SINGULAIR) 10 MG tablet take 1 tablet by mouth at bedtime Yes Priscella Raquel APRBYRON   tiZANidine (ZANAFLEX) 4 MG tablet TAKE 1 TABLET BY MOUTH EVERY 8 HOURS. Yes JACQUELYN Chiu   metFORMIN (GLUCOPHAGE-XR) 500 MG extended release tablet TAKE 1 TABLET BY MOUTH DAILY BEFORE BREAKFAST AND SUPPER Yes Farhanella JACQUELYN García   vitamin D (ERGOCALCIFEROL) 1.25 MG (37698 UT) CAPS capsule TAKE 1 CAPSULE BY MOUTH EVERY WEEK. Yes Priscella ISIDORO GarcíaN    MG capsule TAKE ONE CAPSULE BY MOUTH TWICE DAILY Yes Farhanella Raquel APRBYRON   metoprolol tartrate (LOPRESSOR) 25 MG tablet TAKE 1/2 TABLET BY MOUTH TWICE DAILY Yes JACQUELYN Chiu   triamcinolone (KENALOG) 0.1 % cream APPLY TO AFFECTED AREA TWICE A DAY Yes JACQUELYN Chiu   diclofenac sodium (VOLTAREN) 1 % GEL Apply 2 g topically 4 times daily as needed for Pain Yes Priscella ISIDORO GarcíaN   Elastic Bandages & Supports (KNEE BRACE) MISC Right knee brace. Size XL. Elastic/compression.  Yes JACQUELYN Ocampo - CNP   Blood Glucose Monitoring Suppl (ONE TOUCH ULTRA MINI) w/Device KIT use as directed Yes Jaziel Monaco DO   Elastic Bandages & Supports (CARPAL TUNNEL WRIST STABILIZER) MISC Bilateral carpal tunnel splints for carpal tunnel syndrome bilaterally (Dx: G56.01, G56.02) Yes Leta Perry,    loratadine (CLARITIN) 10 MG tablet TAKE 1 TABLET BY MOUTH EVERY DAY  PriscJACQUELYN Abdi   HYDROcodone-acetaminophen (NORCO) 7.5-325 MG per tablet Take 1 tablet by mouth every 8 hours as needed for Pain for up to 30 days. Cory Bolus, APRN   ipratropium-albuterol (DUONEB) 0.5-2.5 (3) MG/3ML SOLN nebulizer solution Inhale 3 mLs into the lungs every 4 hours as needed for Shortness of Breath  Cory Bolus, APRN       ASSESSMENT:  1. Type 2 diabetes mellitus with hyperglycemia, without long-term current use of insulin ([facility] [address]    2. Chronic neck and back pain    3. Neuropathy of foot, unspecified laterality    4. Essential hypertension    5. Hyperlipidemia, unspecified hyperlipidemia type    6. Allergic rhinitis, unspecified seasonality, unspecified trigger        PLAN:  Orders Placed This Encounter   Medications    pregabalin (LYRICA) 100 MG capsule     Sig: TAKE 1 CAPSULE BY MOUTH TWICE DAILY     Dispense:  60 capsule     Refill:  5    glimepiride (AMARYL) 4 MG tablet     Sig: take 1 tablet by mouth every morning BEFORE BREAKFAST     Dispense:  30 tablet     Refill:  5    lisinopril (PRINIVIL;ZESTRIL) 5 MG tablet     Sig: TAKE 1 TABLET BY MOUTH ONCE DAILY     Dispense:  30 tablet     Refill:  5    bumetanide (BUMEX) 1 MG tablet     Sig: TAKE 1 TABLET BY MOUTH EVERY DAY     Dispense:  30 tablet     Refill:  5    atorvastatin (LIPITOR) 40 MG tablet     Sig: TAKE 1 TABLET BY MOUTH ONCE DAILY     Dispense:  30 tablet     Refill:  5    fluticasone (FLONASE) 50 MCG/ACT nasal spray     Si sprays by Each Nostril route daily     Dispense:  1 Bottle     Refill:  5    fexofenadine (ALLEGRA) 180 MG tablet     Sig: Take 1 tablet by mouth daily For allergies in place of Claritin     Dispense:  30 tablet     Refill:  5     No orders of the defined types were placed in this encounter. Patient Instructions   · Keep a list of your medicines with you. List all of the prescription medicines, nonprescription medicines, supplements, natural remedies, and vitamins that you take. Tell your healthcare providers who treat you about all of the products you are taking.  Your provider can provide you with a form to keep track of them. Just ask. · Follow the directions that come with your medicine, including information about food or alcohol. Make sure you know how and when to take your medicine. Do not take more or less than you are supposed to take. · Keep all medicines out of the reach of children. · Store medicines according to the directions on the label. · Monitor yourself. Learn to know how your body reacts to your new medicine and keep track of how it makes you feel before attempting (If your provider has allowed you to do so) to drive or go to work. · Seek emergency medical attention if you think you have used too much of this medicine. An overdose of any prescription medicine can be fatal. Overdose symptoms may include extreme drowsiness, muscle weakness, confusion, cold and clammy skin, pinpoint pupils, shallow breathing, slow heart rate, fainting, or coma. · Don't share prescription medicines with others, even when they seem to have the same symptoms. What may be good for you may be harmful to others. · If you are no longer taking a prescribed medication and you have pills left please take your pills out of their original containers. Mix crushed pills with an undesirable substance, such as cat litter or used coffee grounds. Put the mixture into a disposable container with a lid, such as an empty margarine tub, or into a sealable bag. Cover up or remove any of your personal information on the empty containers by covering it with black permanent marker or duct tape. Place the sealed container with the mixture, and the empty drug containers, in the trash. · If you use a medication that is in the form of a patch, dispose of used patches by folding them in half so that the sticky sides meet, and then flushing them down a toilet. They should not be placed in the household trash where children or pets can find them.   · If you have any questions, ask your provider or pharmacist for more information. · Be sure to keep all appointments for provider visits or tests. We are committed to providing you with the best care possible. In order to help us achieve these goals please remember to bring all medications, herbal products, and over the counter supplements with you to each visit. If your provider has ordered testing for you, please be sure to follow up with our office if you have not received results within 7 days after the testing took place. *If you receive a survey after visiting one of our offices, please take time to share your experience concerning your physician office visit. These surveys are confidential and no health information about you is shared. We are eager to improve for you and we are counting on your feedback to help make that happen. Thank you for requesting your Continuity of Care Document (CCD) electronically. Please follow the instructions below to securely access your online medical record. [employer] allows you to send messages to your doctor, view your test results, renew your prescriptions, schedule appointments, and more. How Do I Access my CCD? In your Internet browser, go to [email or web address] Enter your user name and password   Click on My medical Record  --> Download Summary --> Enter Password --> Download --> Save or Open Document    Additional Information  If you have questions, please contact your physician practice where you receive care. Remember, [employer] is NOT to be used for urgent needs. For medical emergencies, dial 911. Return in about 3 months (around 5/25/2021). Anahi Ceja CMA am scribing for and in the presence of JACQUELYN Valdez on 3/22/2021 at 10:47 PM.      Gloria Boast APRN, personally performed the services described in the documentation as scribed by Gabby Rocha CMA, in my presence and it is both accurate and complete.

## 2021-03-01 ENCOUNTER — TELEPHONE (OUTPATIENT)
Dept: PRIMARY CARE CLINIC | Age: 56
End: 2021-03-01

## 2021-03-02 RX ORDER — IPRATROPIUM BROMIDE AND ALBUTEROL SULFATE 2.5; .5 MG/3ML; MG/3ML
1 SOLUTION RESPIRATORY (INHALATION) EVERY 4 HOURS PRN
Qty: 360 ML | Refills: 5 | Status: SHIPPED | OUTPATIENT
Start: 2021-03-02

## 2021-03-02 NOTE — TELEPHONE ENCOUNTER
She only takes it once daily. She gets out when of breath when she gets up and moves around. She is unsure of the name? ?

## 2021-03-09 DIAGNOSIS — M19.90 ARTHRITIS: ICD-10-CM

## 2021-03-09 DIAGNOSIS — G89.29 CHRONIC BACK PAIN, UNSPECIFIED BACK LOCATION, UNSPECIFIED BACK PAIN LATERALITY: ICD-10-CM

## 2021-03-09 DIAGNOSIS — M54.9 CHRONIC BACK PAIN, UNSPECIFIED BACK LOCATION, UNSPECIFIED BACK PAIN LATERALITY: ICD-10-CM

## 2021-03-09 RX ORDER — HYDROCODONE BITARTRATE AND ACETAMINOPHEN 7.5; 325 MG/1; MG/1
1 TABLET ORAL EVERY 8 HOURS PRN
Qty: 90 TABLET | Refills: 0 | Status: SHIPPED | OUTPATIENT
Start: 2021-03-09 | End: 2021-01-01 | Stop reason: SDUPTHER

## 2021-03-12 ENCOUNTER — HOSPITAL ENCOUNTER (OUTPATIENT)
Facility: HOSPITAL | Age: 56
Discharge: HOME | End: 2021-03-12
Payer: MEDICAID

## 2021-03-12 DIAGNOSIS — E11.40 TYPE 2 DIABETES MELLITUS WITH DIABETIC NEUROPATHY, WITHOUT LONG-TERM CURRENT USE OF INSULIN (HCC): ICD-10-CM

## 2021-03-12 DIAGNOSIS — I10 ESSENTIAL HYPERTENSION: ICD-10-CM

## 2021-03-12 LAB
A/G RATIO: 1.3 (ref 0.8–2)
ALBUMIN SERPL-MCNC: 4.1 G/DL (ref 3.4–4.8)
ALP BLD-CCNC: 102 U/L (ref 25–100)
ALT SERPL-CCNC: 29 U/L (ref 4–36)
ANION GAP SERPL CALCULATED.3IONS-SCNC: 11 MMOL/L (ref 3–16)
AST SERPL-CCNC: 25 U/L (ref 8–33)
BASOPHILS ABSOLUTE: 0.1 K/UL (ref 0–0.1)
BASOPHILS RELATIVE PERCENT: 0.8 %
BILIRUB SERPL-MCNC: 0.9 MG/DL (ref 0.3–1.2)
BUN BLDV-MCNC: 23 MG/DL (ref 6–20)
CALCIUM SERPL-MCNC: 10 MG/DL (ref 8.5–10.5)
CHLORIDE BLD-SCNC: 100 MMOL/L (ref 98–107)
CHOLESTEROL, TOTAL: 113 MG/DL (ref 0–200)
CO2: 29 MMOL/L (ref 20–30)
CREAT SERPL-MCNC: 1.4 MG/DL (ref 0.4–1.2)
EOSINOPHILS ABSOLUTE: 0.2 K/UL (ref 0–0.4)
EOSINOPHILS RELATIVE PERCENT: 3.5 %
GFR AFRICAN AMERICAN: 47
GFR NON-AFRICAN AMERICAN: 39
GLOBULIN: 3.1 G/DL
GLUCOSE BLD-MCNC: 126 MG/DL (ref 74–106)
HBA1C MFR BLD: 7 %
HCT VFR BLD CALC: 45 % (ref 37–47)
HDLC SERPL-MCNC: 37 MG/DL (ref 40–60)
HEMOGLOBIN: 14 G/DL (ref 11.5–16.5)
IMMATURE GRANULOCYTES #: 0 K/UL
IMMATURE GRANULOCYTES %: 0.3 % (ref 0–5)
LDL CHOLESTEROL CALCULATED: 42 MG/DL
LYMPHOCYTES ABSOLUTE: 1.7 K/UL (ref 1.5–4)
LYMPHOCYTES RELATIVE PERCENT: 25.8 %
MCH RBC QN AUTO: 31.3 PG (ref 27–32)
MCHC RBC AUTO-ENTMCNC: 31.1 G/DL (ref 31–35)
MCV RBC AUTO: 100.7 FL (ref 80–100)
MONOCYTES ABSOLUTE: 0.5 K/UL (ref 0.2–0.8)
MONOCYTES RELATIVE PERCENT: 8.1 %
NEUTROPHILS ABSOLUTE: 4 K/UL (ref 2–7.5)
NEUTROPHILS RELATIVE PERCENT: 61.5 %
PDW BLD-RTO: 14.5 % (ref 11–16)
PLATELET # BLD: 192 K/UL (ref 150–400)
PMV BLD AUTO: 12 FL (ref 6–10)
POTASSIUM SERPL-SCNC: 4.5 MMOL/L (ref 3.4–5.1)
RBC # BLD: 4.47 M/UL (ref 3.8–5.8)
SODIUM BLD-SCNC: 140 MMOL/L (ref 136–145)
TOTAL PROTEIN: 7.2 G/DL (ref 6.4–8.3)
TRIGL SERPL-MCNC: 169 MG/DL (ref 0–249)
VLDLC SERPL CALC-MCNC: 34 MG/DL
WBC # BLD: 6.5 K/UL (ref 4–11)

## 2021-03-12 PROCEDURE — 85025 COMPLETE CBC W/AUTO DIFF WBC: CPT

## 2021-03-12 PROCEDURE — 80053 COMPREHEN METABOLIC PANEL: CPT

## 2021-03-12 PROCEDURE — 80061 LIPID PANEL: CPT

## 2021-03-12 PROCEDURE — 83036 HEMOGLOBIN GLYCOSYLATED A1C: CPT

## 2021-05-25 NOTE — PROGRESS NOTES
Health Maintenance Due This Visit   Colonoscopy No   Mammogram No   Annual Wellness Visit No   Microalbumin Yes   HgbA1C No   Diabetic Eye Exam No    House Bill One Due This Visit   NITHYA No   UDS No   Contract No        Chief Complaint   Patient presents with    Hypertension    Diabetes    Dysuria    COPD     Pt here today for f/u on htn, COPD, and DM. Sugar running . C/o painful urination x 1 week    Have you seen any other physician or provider since your last visit no    Have you had any other diagnostic tests since your last visit? no    Have you changed or stopped any medications since your last visit? no     SUBJECTIVE:    Patient ID: Cm Arriola is a 60 y.o. female. Medical History Review  Past Medical, Family, and Social History reviewed. Health Maintenance Due   Topic Date Due    Hepatitis C screen  Never done    HIV screen  Never done    Hepatitis B vaccine (1 of 3 - Risk 3-dose series) Never done    Shingles Vaccine (1 of 2) Never done    Diabetic retinal exam  05/10/2017    Cervical cancer screen  08/21/2017    Diabetic foot exam  03/09/2018    Diabetic microalbuminuria test  03/20/2021       HPI:   Chief Complaint   Patient presents with    Hypertension    Diabetes    Dysuria    COPD   She has been watching her diet. Her feet are bothering her more. She feels like she needs a higher dose of Lyrica. Patient's medications, allergies, past medical, surgical, social and family histories were reviewed and updated as appropriate. Review of Systems   Constitutional: Negative for chills and fever. HENT: Negative for ear pain and sore throat. Eyes: Negative for pain and visual disturbance. Respiratory: Negative for cough and shortness of breath. Cardiovascular: Negative for chest pain, palpitations and leg swelling. Gastrointestinal: Negative for abdominal pain, nausea and vomiting. Genitourinary: Negative for dysuria and hematuria.    Musculoskeletal: Positive for back pain. Negative for joint swelling. Skin: Negative for rash. Neurological: Negative for dizziness and weakness. Psychiatric/Behavioral: Negative for sleep disturbance. Reviewed and acurate. See MA note. OBJECTIVE:  /60 (Site: Left Lower Arm, Position: Sitting)   Pulse 69   Temp 96.7 °F (35.9 °C) (Temporal)   Ht 5' 4\" (1.626 m)   Wt 296 lb (134.3 kg)   SpO2 91%   BMI 50.81 kg/m²    Physical Exam  Vitals reviewed. Constitutional:       General: She is not in acute distress. Appearance: She is well-developed. HENT:      Head: Normocephalic. Mouth/Throat:      Pharynx: No oropharyngeal exudate. Eyes:      General: Lids are normal.   Cardiovascular:      Rate and Rhythm: Normal rate and regular rhythm. Heart sounds: Normal heart sounds. Pulmonary:      Effort: Pulmonary effort is normal.      Breath sounds: Normal breath sounds. Abdominal:      General: Bowel sounds are normal. There is no distension. Palpations: Abdomen is soft. Tenderness: There is no abdominal tenderness. Musculoskeletal:      Cervical back: Neck supple. Lymphadenopathy:      Cervical: No cervical adenopathy. Skin:     General: Skin is warm and dry. Neurological:      Mental Status: She is alert and oriented to person, place, and time. No results found for requested labs within last 30 days. Hemoglobin A1C (%)   Date Value   05/25/2021 6.6 (H)     Microscopic Examination (no units)   Date Value   04/13/2015 YES     Microalbumin, Random Urine (mg/dL)   Date Value   03/09/2018 <1.20     LDL Calculated (mg/dL)   Date Value   03/12/2021 42       Lab Results   Component Value Date    WBC 6.5 03/12/2021    NEUTROABS 4.0 03/12/2021    HGB 14.0 03/12/2021    HCT 45.0 03/12/2021    .7 (H) 03/12/2021     03/12/2021     Lab Results   Component Value Date    TSH 3.93 04/13/2015       Prior to Visit Medications    Medication Sig Taking?  Authorizing Provider pregabalin (LYRICA) 150 MG capsule Take 1 capsule by mouth 2 times daily for 60 days. TAKE 1 CAPSULE BY MOUTH TWICE DAILY Yes JACQUELYN Martins   Albuterol Sulfate, sensor, (PROAIR DIGIHALER) 108 (90 Base) MCG/ACT AEPB Inhale 1 puff into the lungs every 4 hours as needed (SOB) Yes August JACQUELYN Bae   ELIQUIS 2.5 MG TABS tablet TAKE 1 TABLET BY MOUTH EVERY 12 HOURS Yes August JACQUELYN Bae    MG capsule TAKE ONE CAPSULE BY MOUTH TWICE DAILY Yes August JACQUELYN Bae   loratadine (CLARITIN) 10 MG tablet TAKE 1 TABLET BY MOUTH EVERY DAY Yes August JACQUELYN Bae   ipratropium-albuterol (DUONEB) 0.5-2.5 (3) MG/3ML SOLN nebulizer solution Inhale 3 mLs into the lungs every 4 hours as needed for Shortness of Breath Yes August JACQUELYN Bae   glimepiride (AMARYL) 4 MG tablet take 1 tablet by mouth every morning BEFORE BREAKFAST Yes August JACQUELYN Bae   lisinopril (PRINIVIL;ZESTRIL) 5 MG tablet TAKE 1 TABLET BY MOUTH ONCE DAILY Yes August JACQUELYN Bae   bumetanide (BUMEX) 1 MG tablet TAKE 1 TABLET BY MOUTH EVERY DAY Yes August JACQUELYN Bae   fluticasone (FLONASE) 50 MCG/ACT nasal spray 2 sprays by Each Nostril route daily Yes August JACQUELYN Bae   omeprazole (PRILOSEC) 40 MG delayed release capsule TAKE 1 CAPSULE BY MOUTH EVERY MORNING BEFORE BREAKFAST Yes August JACQUELYN Bae   umeclidinium-vilanterol (ANORO ELLIPTA) 62.5-25 MCG/INH AEPB inhaler Inhale 1 puff into the lungs daily Yes JACQUELYN Martins   Lancets (ONETOUCH DELICA PLUS [id number]) [address] [facility] TEST TWICE DAILY Yes JACQUELYN Martins   ONETOUCH ULTRA strip USE TO TEST BLOOD SUGAR TWICE A DAY Yes JACQUELYN Martins   montelukast (SINGULAIR) 10 MG tablet take 1 tablet by mouth at bedtime Yes August JACQUELYN Bae   tiZANidine (ZANAFLEX) 4 MG tablet TAKE 1 TABLET BY MOUTH EVERY 8 HOURS.  Yes August JACQUELYN Bae   metFORMIN (GLUCOPHAGE-XR) 500 MG extended release tablet TAKE 1 TABLET BY MOUTH DAILY BEFORE BREAKFAST AND SUPPER Yes August JACQUELYN Bae vitamin D (ERGOCALCIFEROL) 1.25 MG (45727 UT) CAPS capsule TAKE 1 CAPSULE BY MOUTH EVERY WEEK. Yes JACQUELYN Knowles   metoprolol tartrate (LOPRESSOR) 25 MG tablet TAKE 1/2 TABLET BY MOUTH TWICE DAILY Yes JACQUELYN Knowles   triamcinolone (KENALOG) 0.1 % cream APPLY TO AFFECTED AREA TWICE A DAY Yes JACQUELYN Knowles   diclofenac sodium (VOLTAREN) 1 % GEL Apply 2 g topically 4 times daily as needed for Pain Yes JACQUELYN Knowles   Elastic Bandages & Supports (KNEE BRACE) MISC Right knee brace. Size XL. Elastic/compression. Yes JACQUELYN Dias - CNP   Blood Glucose Monitoring Suppl (ONE TOUCH ULTRA MINI) w/Device KIT use as directed Yes Mattie Serrano DO   Elastic Bandages & Supports (CARPAL TUNNEL WRIST STABILIZER) MISC Bilateral carpal tunnel splints for carpal tunnel syndrome bilaterally (Dx: G56.01, G56.02) Yes Leta Perry,    HYDROcodone-acetaminophen (NORCO) 7.5-325 MG per tablet Take 1 tablet by mouth every 8 hours as needed for Pain for up to 30 days. JACQUELYN Knowles   atorvastatin (LIPITOR) 40 MG tablet TAKE 1 TABLET BY MOUTH ONCE DAILY  Patient not taking: Reported on 5/25/2021  JACQUELYN Knowles       ASSESSMENT:  1. Type 2 diabetes mellitus with hyperglycemia, without long-term current use of insulin ([facility] [address]    2. Chronic neck and back pain    3. Neuropathy of foot, unspecified laterality        PLAN:  Orders Placed This Encounter   Medications    pregabalin (LYRICA) 150 MG capsule     Sig: Take 1 capsule by mouth 2 times daily for 60 days.  TAKE 1 CAPSULE BY MOUTH TWICE DAILY     Dispense:  60 capsule     Refill:  5    Albuterol Sulfate, sensor, (PROAIR DIGIHALER) 108 (90 Base) MCG/ACT AEPB     Sig: Inhale 1 puff into the lungs every 4 hours as needed (SOB)     Dispense:  1 each     Refill:  5     Orders Placed This Encounter   Procedures    HEMOGLOBIN A1C     Patient Instructions   The medication list included in this document is our record of what you are currently taking, including any changes that were made at today's visit.  If you find any differences when compared to your medications at home, or have any questions that were not answered at your visit, please contact the office. · Keep a list of your medicines with you. List all of the prescription medicines, nonprescription medicines, supplements, natural remedies, and vitamins that you take. Tell your healthcare providers who treat you about all of the products you are taking. Your provider can provide you with a form to keep track of them. Just ask. · Follow the directions that come with your medicine, including information about food or alcohol. Make sure you know how and when to take your medicine. Do not take more or less than you are supposed to take. · Keep all medicines out of the reach of children. · Store medicines according to the directions on the label. · Monitor yourself. Learn to know how your body reacts to your new medicine and keep track of how it makes you feel before attempting (If your provider has allowed you to do so) to drive or go to work. · Seek emergency medical attention if you think you have used too much of this medicine. An overdose of any prescription medicine can be fatal. Overdose symptoms may include extreme drowsiness, muscle weakness, confusion, cold and clammy skin, pinpoint pupils, shallow breathing, slow heart rate, fainting, or coma. · Don't share prescription medicines with others, even when they seem to have the same symptoms. What may be good for you may be harmful to others. · If you are no longer taking a prescribed medication and you have pills left please take your pills out of their original containers. Mix crushed pills with an undesirable substance, such as cat litter or used coffee grounds. Put the mixture into a disposable container with a lid, such as an empty margarine tub, or into a sealable bag.   Cover up or remove any of your personal information on the empty containers by covering it with black permanent marker or duct tape. Place the sealed container with the mixture, and the empty drug containers, in the trash. · If you use a medication that is in the form of a patch, dispose of used patches by folding them in half so that the sticky sides meet, and then flushing them down a toilet. They should not be placed in the household trash where children or pets can find them. · If you have any questions, ask your provider or pharmacist for more information. · Be sure to keep all appointments for provider visits or tests. We are committed to providing you with the best care possible. In order to help us achieve these goals please remember to bring all medications, herbal products, and over the counter supplements with you to each visit. If your provider has ordered testing for you, please be sure to follow up with our office if you have not received results within 7 days after the testing took place. *If you receive a survey after visiting one of our offices, please take time to share your experience concerning your physician office visit. These surveys are confidential and no health information about you is shared. We are eager to improve for you and we are counting on your feedback to help make that happen. Return in about 3 months (around 8/25/2021).

## 2021-05-27 NOTE — TELEPHONE ENCOUNTER
Patients inhaler was not covered on her insurance. I called the pharmacy and regular albuterol is covered. Patient stated she would try this. Also she ask about her labs and I told her that her A1C was some better and if Ilda had any other issues with her labs we would call her back.

## 2021-07-07 NOTE — PROGRESS NOTES
"Chief Complaint   Patient presents with   • Follow-up   • Shortness of Breath       Subjective   Chante Wetzel is a 56 y.o. female.     History of Present Illness   Patient was evaluated today for follow up of shortness of breath, hypoxia, ARIANNA and COPD.     Patient says that her symptoms have been somewhat worse since the last clinic visit. she reports no recent exacerbations.     Patient is using Anoro, as prescribed. Exercise tolerance has also remained stable.     Continues to smoke vapor cigarettes per day.    Quit smoking 16 years ago.     Patient doesn't report any issues with the PAP device. The patient describes no significant issues with her mask either.    Patient says that the compliance with the use of the equipment is good.     Patient says that her symptoms of fatigue & daytime sleepiness have been helped greatly with the use of PAP, as prescribed.         The following portions of the patient's history were reviewed and updated as appropriate: allergies, current medications, past family history, past medical history, past social history and past surgical history.    Review of Systems   Constitutional: Negative for chills and fever.   HENT: Negative for rhinorrhea, sinus pressure, sneezing and sore throat.    Respiratory: Positive for shortness of breath. Negative for cough and wheezing.    Psychiatric/Behavioral: Negative for sleep disturbance.       Objective   Visit Vitals  /74   Pulse 75   Resp 16   Ht 162.6 cm (64\")   Wt 136 kg (300 lb)   SpO2 (!) 78% Comment: resting on room air   BMI 51.49 kg/m²   SpO2: 92% on 3 lpm   Physical Exam  Vitals reviewed.   Constitutional:       Appearance: She is well-developed.   HENT:      Head: Atraumatic.      Mouth/Throat:      Comments: Oropharynx was crowded.  Neck:      Comments: Increased adipose tissue noted.  Cardiovascular:      Rate and Rhythm: Normal rate and regular rhythm.   Pulmonary:      Effort: Pulmonary effort is normal. No respiratory " distress.      Comments: Somewhat hyperresonant to percussion.  Somewhat decreased air entry.  Mild scattered wheezing noted.   Musculoskeletal:      Cervical back: Neck supple.      Comments: Used a wheelchair.     Skin:     General: Skin is warm and dry.   Neurological:      Mental Status: She is alert and oriented to person, place, and time.           Assessment/Plan   Diagnoses and all orders for this visit:    1. Shortness of breath (Primary)  -     Adult Transthoracic Echo Complete W/ Cont if Necessary Per Protocol; Future  -     Pulmonary Function Test; Future    2. Chronic obstructive pulmonary disease, unspecified COPD type (CMS/HCC)  -     Adult Transthoracic Echo Complete W/ Cont if Necessary Per Protocol; Future  -     Pulmonary Function Test; Future    3. Obstructive sleep apnea    4. Morbid obesity, unspecified obesity type (CMS/HCC)    5. Hypoxia    6. Other pulmonary embolism without acute cor pulmonale, unspecified chronicity (CMS/HCC)  -     Adult Transthoracic Echo Complete W/ Cont if Necessary Per Protocol; Future    7. Electronic cigarette use           Return in about 4 months (around 11/7/2021) for Recheck, PFT F/U, Echo results, For [place].    DISCUSSION (if any):  Last CT scan was reviewed in great detail with the patient. Images reviewed personally.   Results for orders placed during the hospital encounter of 05/29/20    CT Chest Without Contrast    Narrative  PROCEDURE: CT CHEST WO CONTRAST-    HISTORY: Abn findings on diagnostic imaging of lung; R06.02-Shortness of  breath; J44.9-Chronic obstructive pulmonary disease, unspecified;  R91.8-Other nonspecific abnormal finding of lung field    COMPARISON:  None .    PROCEDURE:  Multiple axial CT images were obtained from the thoracic  inlet through the upper abdomen without the use of contrast.    FINDINGS:  Soft tissue windows reveal no axillary, hilar or mediastinal adenopathy.  Heart size is normal. The aorta is normal in caliber. There are  no  pleural or pericardial effusions. Lung windows reveal scarring in the  anterior right upper lobe and right middle lobe. There are no suspicious  pulmonary nodules. Within the visualized upper abdomen there is fatty  infiltration of the liver. There are fat-containing ventral hernias. The  upper abdomen is otherwise unremarkable. Bone windows reveal no acute  osseous abnormalities.    Impression  No acute process.    1035.70 mGy.cm      This study was performed with techniques to keep radiation doses as low  as reasonably achievable (ALARA). Individualized dose reduction  techniques using automated exposure control or adjustment of mA and/or  kV according to the patient size were employed.    This report was finalized on 5/29/2020 2:37 PM by Liam Ybarra M.D..      Laboratory data was reviewed with her.   Lab Results   Component Value Date    AFPTM 166 07/14/2016     Lab Results   Component Value Date    [id number] MM 07/14/2016     Latest available PFTs were reviewed.  Consistent with Moderate obstruction. Performed in November 2019.    PFTs will be ordered to be done upon follow up.    We have reviewed her pulmonary medications in great detail.    Any needed adjustments to her pulmonary medications, either for clinical or insurance coverage reasons, have been made and are reflected in the orders.    Compliance with medications stressed.     Side effects of prescribed medications discussed with the patient    The patient was advised to continue oxygen 24/7.    I reviewed the results of last sleep study in detail. I informed her that the apnea hypopnea index was 89/ hr, reported on sleep study performed in August 2016.    Continue treatment with CPAP at a pressure of 13, with nasal pillows.    Patient seems to be compliant with PAP device, based on the available data and her account of improved symptoms.     Compliance data was obtained from the her device/DME company.    Sleep hygiene measures were discussed  in great detail including need to follow a strict bedtime and to avoid electronic devices in bed and close to bedtime.    she was also asked to avoid caffeinated or alcoholic beverages within 4 to 6 hours of bedtime.    The patient was once again reminded to continue using the PAP device regularly, every night for atleast 4 hours.    Will order Echo.        Dictated utilizing Dragon dictation.    This document was electronically signed by Yuridia Mccormack MD on 07/07/21 at 15:51 EDT

## 2021-07-14 NOTE — TELEPHONE ENCOUNTER
Pt called with c/o dry persistent cough, wheezing, and congestion.  Requesting meds, refused appt r/t \"feels too bad to come in\"

## 2021-07-18 PROBLEM — I50.31 DIASTOLIC CHF, ACUTE: Status: ACTIVE | Noted: 2021-01-01

## 2021-07-18 PROBLEM — L03.90 CELLULITIS: Status: ACTIVE | Noted: 2021-01-01

## 2021-07-18 PROBLEM — J96.22 ACUTE ON CHRONIC RESPIRATORY FAILURE WITH HYPOXIA AND HYPERCAPNIA (HCC): Status: ACTIVE | Noted: 2021-01-01

## 2021-07-18 PROBLEM — J96.21 ACUTE ON CHRONIC RESPIRATORY FAILURE WITH HYPOXIA AND HYPERCAPNIA (HCC): Status: ACTIVE | Noted: 2021-01-01

## 2021-07-18 PROBLEM — G47.33 OSA (OBSTRUCTIVE SLEEP APNEA): Status: ACTIVE | Noted: 2021-01-01

## 2021-07-18 PROBLEM — Z86.718 HISTORY OF DEEP VEIN THROMBOSIS (DVT) OF LOWER EXTREMITY: Status: ACTIVE | Noted: 2021-01-01

## 2021-07-18 NOTE — H&P
[facility]      Name:  Chante Wetzel   Age:  56 y.o.  Sex:  female  :  1965  MRN:  [id number]   Visit Number:  [id number]  Admission Date:  2021  Date Of Service:  21  Primary Care Physician:  Soumya Muñoz APRN    Chief Complaint:     Dyspnea    History Of Presenting Illness:      This 56-year-old female has a history of OPD congestive heart failure developed cough and increasing shortness of breath was seen by her physician and started on Levaquin and steroids 4 days ago just prior to that she had been exposed to her son who had a croupy cough.  He has a cough marked shortness of breath minimal sputum production and no fever no chest pain.  She has nasal cannula home oxygen and uses nocturnal positive pressure ventilation as well.  Respiratory panel revealed coronavirus OC 43, the pro calcitonin was 0.11 creatinine was 1.31, proBNP was 8744, troponin was less than 0.01 lactic acid was 2.3.    Review Of Systems:    All systems were reviewed and negative except for:   She denies fever sweats chills headache earache or sore throat there is marked shortness of breath with minimal sputum production as above been no change in bowels diarrhea constipation hematochezia abdominal pain nausea or vomiting she denies dysuria or hematuria he does have a rash in her groin area has some swelling of the right lower extremity which is chronic    Past Medical History: Patient  has a past medical history of Congestive heart failure (CMS/[facility]), COPD (chronic obstructive pulmonary disease) (CMS/[facility]), Diabetes mellitus (CMS/[facility]), H/O blood clots, Heart murmur, History of EKG (2015), Hypertension, Kidney failure, Migraine, and Sleep apnea, obstructive.  Is a history of DVT involving the right lower extremity there is a previous history of migraines EKG showing right bundle branch block previous echocardiogram showing a left ventricular ejection fraction of  60%  Past Surgical History: Patient  has a past surgical history that includes Foot surgery; cystoscopy urethral diverticulum repair/excision; colon resection with colostomy; and Revision / takedown colostomy.  Colostomy and takedown was in 2010 been seen by Dr. Horne for evaluation for ventral hernias surgery was not recommended unless she were to lose 100 pounds  Social History: Patient  reports that she has been smoking cigarettes. She has a 25.00 pack-year smoking history. She has never used smokeless tobacco. She reports that she does not drink alcohol and does not use drugs.  She quit smoking in 2004  Family History: Patient's family history includes Cancer in her mother; Diabetes in an other family member; Heart attack in her father; Heart disease in an other family member.    Allergies:      Erythromycin    Home Medications:    Prior to Admission Medications     Prescriptions Last Dose Informant Patient Reported? Taking?    Blood Glucose Monitoring Suppl (ONE TOUCH ULTRA MINI) w/Device kit 7/17/2021  Yes Yes    use as directed    Blood Glucose Monitoring Suppl (ONE TOUCH ULTRA MINI) w/Device kit 7/17/2021  Yes Yes    use as directed    bumetanide (BUMEX) 1 MG tablet 7/17/2021  Yes Yes    Take 1 mg by mouth Daily.    diclofenac (VOLTAREN) 1 % gel gel 7/17/2021  Yes Yes    APPLY 2 GRAMS TOPICALLY QID PRN P    ELIQUIS 2.5 MG tablet tablet 7/17/2021  No Yes    TAKE 1 TABLET BY MOUTH EVERY 12 HOURS    ferrous sulfate 325 (65 FE) MG EC tablet 7/17/2021  Yes Yes    TAKE 1 TABLET BY MOUTH ONCE DAILY WITH BREAKFAST    fluticasone (FLONASE) 50 MCG/ACT nasal spray 7/17/2021  No Yes    1 spray into the nostril(s) as directed by provider Daily.    glimepiride (AMARYL) 4 MG tablet 7/17/2021  Yes Yes    take 1 tablet by mouth every morning BEFORE BREAKFAST    HYDROcodone-acetaminophen (NORCO) 7.5-325 MG per tablet 7/17/2021  Yes Yes    every 8 (eight) hours.    ipratropium-albuterol (DUO-NEB) 0.5-2.5 mg/3 ml nebulizer  7/17/2021  No Yes    Take 3 mL by nebulization 4 (Four) Times a Day As Needed for Wheezing or Shortness of Air.    lisinopril (PRINIVIL,ZESTRIL) 5 MG tablet 7/17/2021  Yes Yes    take 1 tablet by mouth once daily    loratadine (CLARITIN) 10 MG tablet 7/17/2021  Yes Yes    Take  by mouth Daily.    metFORMIN ER (GLUCOPHAGE-XR) 500 MG 24 hr tablet 7/17/2021  Yes Yes    1 ac breakfast and supper    metoprolol tartrate (LOPRESSOR) 25 MG tablet 7/17/2021  Yes Yes    Take 0.5 tablets by mouth 2 times daily    montelukast (SINGULAIR) 10 MG tablet 7/17/2021  No Yes    Take 1 tablet by mouth Every Night.    omeprazole (priLOSEC) 40 MG capsule 7/17/2021  Yes Yes    TAKE 1 CAPSULE BY MOUTH EVERY MORNING BEFORE BREAKFAST    pregabalin (LYRICA) 100 MG capsule 7/17/2021  Yes Yes    Take 100 mg by mouth 2 (Two) Times a Day.    STOOL SOFTENER 100 MG capsule 7/17/2021  Yes Yes    TK ONE C PO BID    tiZANidine (ZANAFLEX) 4 MG tablet 7/17/2021  Yes Yes    take 1 tablet by mouth every 8 hours if needed for muscle spasm    umeclidinium-vilanterol (Anoro Ellipta) 62.5-25 MCG/INH aerosol powder  inhaler 7/17/2021  No Yes    Inhale 1 puff Daily.    vitamin D (ERGOCALCIFEROL) 90110 units capsule capsule 7/17/2021  Yes Yes    take 1 capsule by mouth every week    atorvastatin (LIPITOR) 40 MG tablet   Yes No    take 1 tablet by mouth at bedtime        ED Medications:    Medications   sodium chloride 0.9 % flush 10 mL (has no administration in time range)   methylPREDNISolone sodium succinate (SOLU-Medrol) injection 125 mg (125 mg Intravenous Given 7/17/21 2309)   albuterol sulfate HFA (PROVENTIL HFA;VENTOLIN HFA;PROAIR HFA) inhaler 6 puff (6 puffs Inhalation Given 7/17/21 2309)   cefTRIAXone (ROCEPHIN) IVPB 1 g/50ml dextrose (premix) (1 g Intravenous New Bag 7/18/21 0053)   furosemide (LASIX) injection 80 mg (80 mg Intravenous Given 7/18/21 0050)     Vital Signs:  Temp:  [98 °F (36.7 °C)] 98 °F (36.7 °C)  Heart Rate:  [73-97] 73  Resp:   [24-25] 25  BP: (156)/(92) 156/92        07/17/21  2220   Weight: 136 kg (300 lb)     Body mass index is 51.49 kg/m².    Physical Exam:     General Appearance:  Alert and cooperative.  She is dyspneic at rest and using sensory muscles of respiration   Head:  Atraumatic and normocephalic.   Eyes: Conjunctivae and sclerae normal, no icterus. No pallor.   Ears:     Throat:    Neck: Supple, trachea midline, no thyromegaly.   Back:    There is somewhat of a buffalo hump there is a large patch of erythema in the same area which is tender and slightly fluctuant suspicious for an underlying infected epidermoid cyst   Lungs:    There is tachypnea breath sounds are distant but surprisingly acute clear did not hear wheezing or crackles no crackles or wheezing. No Pleural rub or bronchial breathing.   Heart:  Normal S1 and S2, no murmur, no gallop, no rub. No JVD.   Abdomen:    Abdominal obesity and a large over low hanging panniculus vertical healed surgical scar and apparent multiple ventral hernias soft, nontender, nondistended, no rebound tenderness.   Extremities:  Has 1+ edema of the right lower extremity no cyanosis, no clubbing.   Pulses:    Skin:  Erythematous area upper back as noted above her cheeks are ready appearing consistent with rosacea   Neurologic: Alert and oriented x 3. No facial asymmetry. Moves all four limbs. No tremors.      Laboratory data:    I have reviewed the labs done in the emergency room.    Results from last 7 days   Lab Units 07/17/21  2234   SODIUM mmol/L 140   POTASSIUM mmol/L 5.1   CHLORIDE mmol/L 101   CO2 mmol/L 26.0   BUN mg/dL 24*   CREATININE mg/dL 1.31*   CALCIUM mg/dL 9.6   BILIRUBIN mg/dL 1.0   ALK PHOS U/L 114   ALT (SGPT) U/L 26   AST (SGOT) U/L 29   GLUCOSE mg/dL 106*     Results from last 7 days   Lab Units 07/17/21  2234   WBC 10*3/mm3 8.77   HEMOGLOBIN g/dL 14.2   HEMATOCRIT % 45.5   PLATELETS 10*3/mm3 175         Results from last 7 days   Lab Units 07/17/21  2234   TROPONIN  T ng/mL <0.010     Results from last 7 days   Lab Units 07/17/21  2234   PROBNP pg/mL 8,744.0*             Results from last 7 days   Lab Units 07/17/21  2241   PH, ARTERIAL pH units 7.367   PO2 ART mm Hg 65.6*   PCO2, ARTERIAL mm Hg 51.6*   HCO3 ART mmol/L 29.6*           Invalid input(s): USDES,  BLOODU, NITRITITE, BACT, EP    Pain Management Panel    There is no flowsheet data to display.         EKG:      RBBB    Radiology:  Some cephalization of flow by my exam      Assessment:    This patient has COPD with respiratory failure and hypoxemia    Plan:    Inhaled medications corticosteroids bronchodilators IV antibiotics primarily for the area of cellulitis described above          Shon Vuong MD  07/18/21  02:33 EDT    Dictated utilizing Dragon dictation.

## 2021-07-18 NOTE — PROGRESS NOTES
"Pharmacokinetic Follow-up Note - Vancomycin     Chante Wetzel is a 56 y.o. female  162.6 cm (64\") 136 kg (300 lb)     Indication for use: SSTI    Results from last 7 days   Lab Units 07/18/21  0830 07/17/21  2234   WBC 10*3/mm3 7.05 8.77   CREATININE mg/dL 1.32* 1.31*      Estimated Creatinine Clearance: 64 mL/min (A) (by C-G formula based on SCr of 1.32 mg/dL (H)).  Temp Readings from Last 1 Encounters:   07/18/21 97.5 °F (36.4 °C) (Oral)     Lab Results   Component Value Date    VANCORANDOM 29.30 07/18/2021       Microbiology:  Microbiology Results (last 10 days)       Procedure Component Value - Date/Time    Blood Culture With BASIL - Blood, Hand, Left [[id number]] Collected: 07/18/21 0003    Lab Status: Preliminary result Specimen: Blood from Hand, Left Updated: 07/18/21 1230     Blood Culture No growth at less than 24 hours    Respiratory Panel PCR w/COVID-19(SARS-CoV-2) BRYAN/DANILO/RODRÍGUEZ/PAD/COR/MAD/CLARY In-House, NP Swab in UTM/VTM, 3-4 HR TAT - Swab, Nasopharynx [[id number]]  (Abnormal) Collected: 07/17/21 2252    Lab Status: Final result Specimen: Swab from Nasopharynx Updated: 07/17/21 2344     ADENOVIRUS, PCR Not Detected     Coronavirus 229E Not Detected     Coronavirus HKU1 Not Detected     Coronavirus NL63 Not Detected     Coronavirus OC43 Detected     COVID19 Not Detected     Human Metapneumovirus Not Detected     Human Rhinovirus/Enterovirus Not Detected     Influenza A PCR Not Detected     Influenza B PCR Not Detected     Parainfluenza Virus 1 Not Detected     Parainfluenza Virus 2 Not Detected     Parainfluenza Virus 3 Not Detected     Parainfluenza Virus 4 Not Detected     RSV, PCR Not Detected     Bordetella pertussis pcr Not Detected     Bordetella parapertussis PCR Not Detected     Chlamydophila pneumoniae PCR Not Detected     Mycoplasma pneumo by PCR Not Detected    Narrative:      In the setting of a positive respiratory panel with a viral infection PLUS a negative procalcitonin without other " underlying concern for bacterial infection, consider observing off antibiotics or discontinuation of antibiotics and continue supportive care. If the respiratory panel is positive for atypical bacterial infection (Bordetella pertussis, Chlamydophila pneumoniae, or Mycoplasma pneumoniae), consider antibiotic de-escalation to target atypical bacterial infection.            Current Vancomycin Dose:  1250 mg IVPB every 24 hours, day 1 of therapy.    Other Antimicrobials: Ceftriaxone 2 gm IV Q24H  Doxycycline 100 mg PO BID    Assessment/Plan:  Vancomycin random this afternoon of 29.3 mg/L is within the expected range and predicts the current regimen to be able to meet therapeutic AUC goal. Will continue with current planned dosing and will reassess as necessary with random level pending clinical progress, renal function. Pharmacy will continue to monitor renal function and adjust dose accordingly.    Regimen: 1250 mg IV every 24 hours.  Exposure target: AUC24 (range)400-600 mg/L.hr   AUC24,ss: 537 mg/L.hr  PAUC*: 80 %  Ctrough,ss: 12.1 mg/L  Pconc*: 20 %  Tox.: 7 %    PAUC: probability that AUC is > 400 (mg/L)×hr (efficacy);  Pconc: probability that Ctrough is > 20 mcg/mL (toxicity);  Tox: probability of nephrotoxicity (based on Lodradha et al. Clin Infect Dis 2009).     Thank you for the consult,    Raymon Leung PharmD, BCPS    07/18/21 14:07 EDT

## 2021-07-18 NOTE — PLAN OF CARE
Goal Outcome Evaluation:  Plan of Care Reviewed With: patient      Patient has had no complaints of shortness of breath. Has had diarrhea this shift, MD aware. Patient up ad marybeth. HR dropped into the 40's, Metoprolol dose changed per MD order.  Progress: no change

## 2021-07-18 NOTE — ED PROVIDER NOTES
"Subjective   56-year-old female presenting with shortness of breath.  She states that for the last 2 weeks she has had increasing shortness of breath.  Has had nonproductive cough.  No chest pain, fevers, vomiting or diarrhea.  She notes that prior to symptoms starting she was exposed to her son who had some sort of upper respiratory illness.  Patient is COPD or, wears home oxygen and nightly CPAP.  She was seen by her primary doctor and had steroids and antibiotics called in 4 days ago, symptoms have not improved.          Review of Systems   Constitutional: Negative.    HENT: Negative.    Eyes: Negative.    Respiratory: Positive for cough and shortness of breath.    Cardiovascular: Negative.    Gastrointestinal: Negative.    Genitourinary: Negative.    Musculoskeletal: Negative.    Skin: Negative.    Neurological: Negative.    Psychiatric/Behavioral: Negative.        Past Medical History:   Diagnosis Date   • Congestive heart failure (CMS/[facility])    • COPD (chronic obstructive pulmonary disease) (CMS/[facility])    • Diabetes mellitus (CMS/[facility])    • H/O blood clots     on chronic anticoagulation therapy.    • Heart murmur    • History of EKG 01/29/2015    :  EKG:  Sinus rhythm.    • Hypertension    • Kidney failure    • Migraine    • Sleep apnea, obstructive        Allergies   Allergen Reactions   • Erythromycin Rash and Other (See Comments)     \"needles sticking in my chest\"       Past Surgical History:   Procedure Laterality Date   • COLON RESECTION WITH COLOSTOMY     • CYSTOSCOPY URETHRAL DIVERTICULUM REPAIR/EXCISION     • FOOT SURGERY     • REVISION / TAKEDOWN COLOSTOMY         Family History   Problem Relation Age of Onset   • Cancer Mother    • Heart attack Father    • Diabetes Other    • Heart disease Other        Social History     Socioeconomic History   • Marital status: Legally      Spouse name: Not on file   • Number of children: Not on file   • Years of education: Not on file   • Highest education " level: Not on file   Tobacco Use   • Smoking status: Current Every Day Smoker     Packs/day: 1.00     Years: 25.00     Pack years: 25.00     Types: Cigarettes   • Smokeless tobacco: Never Used   • Tobacco comment: uses e-cig, quit cigarettes in 2004   Vaping Use   • Vaping Use: Every day   Substance and Sexual Activity   • Alcohol use: No   • Drug use: No   • Sexual activity: Defer           Objective   Physical Exam  Constitutional:       Appearance: She is not toxic-appearing or diaphoretic.      Comments: Morbidly obese, increased work of breathing, distress   HENT:      Head: Normocephalic and atraumatic.      Right Ear: External ear normal.      Left Ear: External ear normal.      Nose: Nose normal.      Mouth/Throat:      Mouth: Mucous membranes are moist.      Pharynx: Oropharynx is clear.   Eyes:      Extraocular Movements: Extraocular movements intact.      Conjunctiva/sclera: Conjunctivae normal.      Pupils: Pupils are equal, round, and reactive to light.   Cardiovascular:      Rate and Rhythm: Normal rate and regular rhythm.      Pulses: Normal pulses.      Heart sounds: Normal heart sounds.   Pulmonary:      Comments: Increased work of breathing, poor air movement throughout, scattered wheezes  Abdominal:      General: Bowel sounds are normal. There is no distension.      Tenderness: There is no abdominal tenderness.   Musculoskeletal:         General: No swelling, tenderness or deformity. Normal range of motion.      Cervical back: Normal range of motion.   Skin:     General: Skin is warm and dry.      Capillary Refill: Capillary refill takes less than 2 seconds.      Findings: No rash.   Neurological:      General: No focal deficit present.      Mental Status: She is alert and oriented to person, place, and time.   Psychiatric:         Mood and Affect: Mood normal.         Behavior: Behavior normal.         Procedures           ED Course                                           MDM  Number of  Diagnoses or Management Options  Acute on chronic congestive heart failure, unspecified heart failure type (CMS/HCC)  Acute on chronic respiratory failure with hypoxia and hypercapnia (CMS/HCC)  COPD exacerbation (CMS/HCC)  Coronavirus infection  Diagnosis management comments: 56-year-old female with shortness of breath.  Ill-appearing distressed morbidly obese lady with exam as above.  She is hypoxic even on a simple mask.  Will check labs, Covid swab, chest x-ray, EKG, ABG.  We will put her on BiPAP to help with her work of breathing.  Will give symptomatic treatment.  Disposition pending anticipate admission.    DDx: COPD, CHF, viral illness including COVID-19, pneumonia    EKG interpreted by me: Sinus rhythm, normal rate, RBBB, nonspecific ST/T changes, this is an abnormal EKG, this is similar to previous    Lab work notable for elevated BNP and mild lactic acidosis.  Blood gas with hypercapnia and hypoxia.  Vital signs have improved on BiPAP.  She is resting comfortably.  Chest x-ray with some possible edema.  Viral swab positive for coronavirus (not COVID-19).  Discussed with Dr. Vuong for admission.    30 minutes of critical care provided. This time excludes other billable procedures. Time does include preparation of documents, medical consultations, review of old records, and direct bedside care. Patient was at high risk for life-threatening deterioration due to respiratory failure requiring NIPPV.       Critical Care  Total time providing critical care: 30-74 minutes      Final diagnoses:   Acute on chronic respiratory failure with hypoxia and hypercapnia (CMS/HCC)   COPD exacerbation (CMS/HCC)   Coronavirus infection   Acute on chronic congestive heart failure, unspecified heart failure type (CMS/HCC)          Adolfo Salgado MD  07/18/21 0020

## 2021-07-18 NOTE — PROGRESS NOTES
I have seen and evaluated the patient this morning    Chart reviewed and events noted.    She feels much better and able to breathe easier this morning.  She looks comfortable and not in severe distress    Still wheezing on lung exam with bilateral fine basal crackles.  Appears volume overloaded    Patient was admitted early this morning for acute COPD exacerbation and bilateral pneumonia    Agree with the current antibiotic plan with Rocephin and vancomycin    We will add p.o. doxycycline to cover for atypical infection    Follow sputum culture and MRSA swab    Continue IV steroids    Looks volume overloaded, continue IV Lasix    Obtain echocardiogram    Continue nebulizer treatment

## 2021-07-18 NOTE — PLAN OF CARE
Problem: Adult Inpatient Plan of Care  Goal: Plan of Care Review  Outcome: Ongoing, Progressing  Flowsheets (Taken 7/18/2021 0431)  Plan of Care Reviewed With: patient  Outcome Summary: NEW ADMISSION,NOCT BIPAP.PO PAIN MEDICATION EFFECTIVE.   Goal Outcome Evaluation:  Plan of Care Reviewed With: patient           Outcome Summary: NEW ADMISSION,NOCT BIPAP.PO PAIN MEDICATION EFFECTIVE.

## 2021-07-18 NOTE — CONSULTS
Chante Dayson    1965    Primary Care Provider: Soumya Muñoz APRN    Chief Complaint   Patient presents with   • Shortness of Breath          SUBJECTIVE:    History of present illness: 56-year-old female presents having multiple medical problems, COPD with shortness of breath and redness on her posterior neck region with erythema and some discomfort.  There is concern about abscess possibility is diffuse cellulitis.  No history of a cyst in the region and no history of a mass recent infection or insect bite.  No fever or chills.    Review of Systems:  Constitutional:  Negative for chills, fever, and unexpected weight change.  HENT: Negative for trouble swallowing and voice change.  Eyes:  Negative for visual disturbance.  Respiratory:  Negative for apnea, cough, chest tightness, shortness of breath, and wheezing.  Cardiovascular:  Negative for chest pain, palpitations, and leg swelling.  Gastrointestinal:  Negative for abdominal distention, abdominal pain, anal bleeding, blood in stool, constipation, diarrhea, nausea, rectal pain, and vomiting.  Musculoskeletal:  Negative for back pain, gait problem, and joint swelling.  Skin:  Negative for color change, rash, and wound  Neurological:  Negative for dizziness, syncope, speech difficulty, weakness, numbness, and headaches.  Hematological:  Negative for adenopathy.  Does not bruise/bleed easily.  Psychiatric/Behavioral:  Negative for confusion.  The patient is not nervous/anxious.        History:    Past Medical History:   Diagnosis Date   • Congestive heart failure (CMS/HCC)    • COPD (chronic obstructive pulmonary disease) (CMS/HCC)    • Diabetes mellitus (CMS/HCC)    • H/O blood clots     on chronic anticoagulation therapy.    • Heart murmur    • History of EKG 01/29/2015    :  EKG:  Sinus rhythm.    • Hypertension    • Kidney failure    • Migraine    • Sleep apnea, obstructive        Past Surgical History:   Procedure Laterality Date   • COLON RESECTION  "WITH COLOSTOMY     • CYSTOSCOPY URETHRAL DIVERTICULUM REPAIR/EXCISION     • FOOT SURGERY     • REVISION / TAKEDOWN COLOSTOMY         Family History   Problem Relation Age of Onset   • Cancer Mother    • Heart attack Father    • Diabetes Other    • Heart disease Other        Social History     Socioeconomic History   • Marital status: Legally      Spouse name: Not on file   • Number of children: Not on file   • Years of education: Not on file   • Highest education level: Not on file   Tobacco Use   • Smoking status: Current Every Day Smoker     Packs/day: 1.00     Years: 25.00     Pack years: 25.00     Types: Cigarettes   • Smokeless tobacco: Never Used   • Tobacco comment: uses e-cig, quit cigarettes in 2004   Vaping Use   • Vaping Use: Every day   Substance and Sexual Activity   • Alcohol use: No   • Drug use: No   • Sexual activity: Defer       Allergies:  Allergies   Allergen Reactions   • Erythromycin Rash and Other (See Comments)     \"needles sticking in my chest\"       Medications:    Current Facility-Administered Medications:   •  apixaban (ELIQUIS) tablet 2.5 mg, 2.5 mg, Oral, Q12H, Shon Vuong MD, 2.5 mg at 07/18/21 1640  •  atorvastatin (LIPITOR) tablet 40 mg, 40 mg, Oral, Daily, Shon Vuong MD, 40 mg at 07/18/21 0940  •  cefTRIAXone (ROCEPHIN) IVPB 2 g/50ml dextrose (premix), 2 g, Intravenous, Q24H, Ilana Fine MD  •  doxycycline (MONODOX) capsule 100 mg, 100 mg, Oral, Q12H, Ilana Fine MD, 100 mg at 07/18/21 0940  •  ferrous sulfate EC tablet 325 mg, 325 mg, Oral, Daily With Breakfast, Ilana Fine MD, 325 mg at 07/18/21 0940  •  furosemide (LASIX) 40 mg in sodium chloride 0.9 % IVPB, 40 mg, Intravenous, BID, Ilana Fine MD, 40 mg at 07/18/21 0941  •  HYDROcodone-acetaminophen (NORCO) 7.5-325 MG per tablet 1 tablet, 1 tablet, Oral, Q8H PRN, Shon Vuong MD, 1 tablet at 07/18/21 1254  •  lisinopril (PRINIVIL,ZESTRIL) tablet 5 " mg, 5 mg, Oral, Daily, Shon Vuong MD, 5 mg at 07/18/21 0940  •  metFORMIN ER (GLUCOPHAGE-XR) 24 hr tablet 500 mg, 500 mg, Oral, Daily With Breakfast, Shon Vuong MD, 500 mg at 07/18/21 0940  •  methylPREDNISolone sodium succinate (SOLU-Medrol) injection 60 mg, 60 mg, Intravenous, Q8H, Ilana Fine MD, 60 mg at 07/18/21 1640  •  metoprolol tartrate (LOPRESSOR) tablet 12.5 mg, 12.5 mg, Oral, Q12H, Ilana Fine MD  •  montelukast (SINGULAIR) tablet 10 mg, 10 mg, Oral, Nightly, Shon Vuong MD  •  pantoprazole (PROTONIX) EC tablet 40 mg, 40 mg, Oral, QAM, Shon Vuong MD, 40 mg at 07/18/21 0639  •  Pharmacy to dose vancomycin, , Does not apply, Continuous PRN, Shon Vuong MD  •  pregabalin (LYRICA) capsule 100 mg, 100 mg, Oral, Q12H, Shon Vuong MD, 100 mg at 07/18/21 0940  •  sodium chloride 0.9 % flush 10 mL, 10 mL, Intravenous, PRN, Adolfo Salgado MD  •  sodium chloride 0.9 % flush 10 mL, 10 mL, Intravenous, Q12H, Shon Vuong MD, 10 mL at 07/18/21 0941  •  sodium chloride 0.9 % flush 10 mL, 10 mL, Intravenous, PRN, Shon Vuong MD  •  tiZANidine (ZANAFLEX) tablet 4 mg, 4 mg, Oral, Q8H PRN, Ilana Fine MD  •  [START ON 7/19/2021] vancomycin 1250 mg in sodium chloride 0.9% 250 mL IVPB, 1,250 mg, Intravenous, Q24H, Ilana Fine MD    OBJECTIVE:    Vital Signs:   Vitals:    07/18/21 0900 07/18/21 1100 07/18/21 1236 07/18/21 1500   BP:  133/79  117/77   BP Location:  Right arm  Right arm   Patient Position:  Sitting  Sitting   Pulse: 81 53 62 59   Resp:  20  20   Temp:  97.5 °F (36.4 °C)  97.6 °F (36.4 °C)   TempSrc:  Oral  Oral   SpO2:  91%  91%   Weight:       Height:           Physical Exam:   General Appearance:    Alert, cooperative, in no acute distress   Head:    Normocephalic, without obvious abnormality, atraumatic   Eyes:            Lids and lashes normal, conjunctivae and sclerae  normal, no   icterus, no pallor, corneas clear, PERRLA   Throat:   No oral lesions, no thrush, oral mucosa moist   Neck:   No adenopathy, supple, trachea midline, no thyromegaly, no   carotid bruit, no JVD   Lungs:     Clear to auscultation,respirations regular, even and                  unlabored    Heart:    Regular rhythm and normal rate, normal S1 and S2, no            murmur, no gallop, no rub, no click   Chest Wall:    No abnormalities observed.  Posterior upper back with the midline area measuring 12 x 12 cm, generalized erythema and induration edema and warm to the touch.  No fluctuance or definite mass palpated.   Abdomen:     Normal bowel sounds, no masses, no organomegaly, soft        non-tender, non-distended, no guarding, no rebound                tenderness   Extremities:   Moves all extremities well, no edema, no cyanosis, no             redness   Pulses:   Pulses palpable and equal bilaterally   Skin:   No bleeding, bruising or rash   Lymph nodes:   No palpable adenopathy   Neurologic:   Cranial nerves 2 - 12 grossly intact, sensation intact, DTR       present and equal bilaterally   Results Review:    Lab Results (last 24 hours)     Procedure Component Value Units Date/Time    POC Glucose Once [[id number]]  (Abnormal) Collected: 07/18/21 1636    Specimen: Blood Updated: 07/18/21 1647     Glucose 69 mg/dL      Comment: Serial Number: [id number]:  [id number]       Blood Culture With BASIL - Blood, Arm, Left [[id number]] Collected: 07/18/21 0157    Specimen: Blood from Arm, Left Updated: 07/18/21 1445     Blood Culture No growth at less than 24 hours    MRSA Screen, PCR (Inpatient) - Swab, Nares [[id number]] Collected: 07/18/21 1256    Specimen: Swab from Nares Updated: 07/18/21 1340    Respiratory Culture - Sputum, Cough [[id number]] Collected: 07/18/21 1256    Specimen: Sputum from Cough Updated: 07/18/21 1340    Vancomycin, Random [[id number]]  (Normal) Collected: 07/18/21 1205    Specimen: Blood  Updated: 07/18/21 1234     Vancomycin Random 29.30 mcg/mL     Narrative:      Therapeutic Ranges for Vancomycin    Vancomycin Random   5.0-40.0 mcg/mL  Vancomycin Trough   5.0-20.0 mcg/mL  Vancomycin Peak     20.0-40.0 mcg/mL    Blood Culture With BASIL - Blood, Hand, Left [[id number]] Collected: 07/18/21 0003    Specimen: Blood from Hand, Left Updated: 07/18/21 1230     Blood Culture No growth at less than 24 hours    POC Glucose Once [[id number]]  (Abnormal) Collected: 07/18/21 1038    Specimen: Blood Updated: 07/18/21 1042     Glucose 138 mg/dL      Comment: Serial Number: [id number]:  [id number]       CBC & Differential [[id number]]  (Abnormal) Collected: 07/18/21 0830    Specimen: Blood Updated: 07/18/21 0928    Narrative:      The following orders were created for panel order CBC & Differential.  Procedure                               Abnormality         Status                     ---------                               -----------         ------                     Scan Slide[[id number]]                                       Final result               CBC Auto Differential[[id number]]        Abnormal            Final result                 Please view results for these tests on the individual orders.    Scan Slide [[id number]] Collected: 07/18/21 0830    Specimen: Blood Updated: 07/18/21 0928     RBC Morphology Normal     WBC Morphology Normal     Platelet Estimate Adequate    CBC Auto Differential [[id number]]  (Abnormal) Collected: 07/18/21 0830    Specimen: Blood Updated: 07/18/21 0904     WBC 7.05 10*3/mm3      RBC 4.64 10*6/mm3      Hemoglobin 14.1 g/dL      Hematocrit 45.2 %      MCV 97.4 fL      MCH 30.4 pg      MCHC 31.2 g/dL      RDW 15.7 %      RDW-SD 55.8 fl      MPV 11.2 fL      Platelets 174 10*3/mm3      Neutrophil % 88.4 %      Lymphocyte % 8.5 %      Monocyte % 2.3 %      Eosinophil % 0.0 %      Basophil % 0.1 %      Immature Grans % 0.7 %      Neutrophils, Absolute 6.23 10*3/mm3      Lymphocytes,  Absolute 0.60 10*3/mm3      Monocytes, Absolute 0.16 10*3/mm3      Eosinophils, Absolute 0.00 10*3/mm3      Basophils, Absolute 0.01 10*3/mm3      Immature Grans, Absolute 0.05 10*3/mm3      nRBC 0.0 /100 WBC     Comprehensive Metabolic Panel [[id number]]  (Abnormal) Collected: 07/18/21 0830    Specimen: Blood Updated: 07/18/21 0904     Glucose 161 mg/dL      BUN 26 mg/dL      Creatinine 1.32 mg/dL      Sodium 141 mmol/L      Potassium 4.5 mmol/L      Chloride 101 mmol/L      CO2 26.8 mmol/L      Calcium 9.9 mg/dL      Total Protein 7.7 g/dL      Albumin 3.60 g/dL      ALT (SGPT) 24 U/L      AST (SGOT) 22 U/L      Alkaline Phosphatase 107 U/L      Total Bilirubin 0.9 mg/dL      eGFR Non African Amer 41 mL/min/1.73      Globulin 4.1 gm/dL      A/G Ratio 0.9 g/dL      BUN/Creatinine Ratio 19.7     Anion Gap 13.2 mmol/L     Narrative:      GFR Normal >60  Chronic Kidney Disease <60  Kidney Failure <15      POC Glucose Once [[id number]]  (Abnormal) Collected: 07/18/21 0632    Specimen: Blood Updated: 07/18/21 0636     Glucose 158 mg/dL      Comment: Serial Number: [id number]:  [id number]       STAT Lactic Acid, Reflex [[id number]]  (Abnormal) Collected: 07/18/21 0157    Specimen: Blood Updated: 07/18/21 0308     Lactate 2.5 mmol/L             I reviewed the patient's new clinical results.    ASSESSMENT PLAN:    Patient Active Problem List   Diagnosis   • Dyspnea   • Pulmonary hypertension (CMS/HCC)   • Benign hypertension   • Lower extremity edema   • Obesity   • Type 2 diabetes mellitus (CMS/HCC)   • Hypertension   • COPD (chronic obstructive pulmonary disease) (CMS/HCC)   • Dependence on supplemental oxygen   • Cor pulmonale (chronic)   • Acute on chronic respiratory failure with hypoxia and hypercapnia (CMS/HCC)   • ARIANNA (obstructive sleep apnea)   • Cellulitis   • History of deep vein thrombosis (DVT) of lower extremity   • Diastolic CHF, acute (CMS/HCC)       Cellulitis posterior upper back, no fluctuance or  definite mass palpated.  On IV antibiotics.  Recommend ultrasound of the soft tissue of the region to rule out deep abscess or multiloculated abscess.  If indicated, would drain tomorrow ultrasound positive.  Continue IV antibiotics.  Recommend heat compresses.    I discussed the patients findings and my recommendations with patient    Joseph Velásquez MD  07/18/21  19:56 EDT

## 2021-07-18 NOTE — NURSING NOTE
FSBS 69, patient alert and oriented. Does not feel lethargic from low blood sugar. Sprite given,  bringing dinner.

## 2021-07-19 NOTE — CASE MANAGEMENT/SOCIAL WORK
Discharge Planning Assessment   Harjinder     Patient Name: Chante Wetzel  MRN: [id number]  Today's Date: 7/19/2021    Admit Date: 7/17/2021    Discharge Needs Assessment     Row Name 07/19/21 1156       Living Environment    Lives With  spouse    Name(s) of Who Lives With Patient  Adan [place]    Current Living Arrangements  home/apartment/condo    Potentially Unsafe Housing Conditions  -- denies issues    Primary Care Provided by  self    Family Caregiver if Needed  spouse       Resource/Environmental Concerns    Resource/Environmental Concerns  none    Transportation Concerns  car, none       Transition Planning    Patient/Family Anticipates Transition to  home with family    Patient/Family Anticipated Services at Transition  none    Transportation Anticipated  family or friend will provide       Discharge Needs Assessment    Readmission Within the Last 30 Days  no previous admission in last 30 days    Equipment Currently Used at Home  cpap;oxygen    Concerns to be Addressed  no discharge needs identified    Anticipated Changes Related to Illness  none    Equipment Needed After Discharge  none    Provided Post Acute Provider List?  Refused    Provided Post Acute Provider Quality & Resource List?  Refused        Discharge Plan     Row Name 07/19/21 1155       Plan    Plan  home with family    Plan Comments  independent of ADL's, lives with spouse Adan,  on oxygen 2-3 liters continously through [facility],  aslo  Cpap through [facility],  no HH,  own POA, no advance directives,  patient drives self  but states Adan will pick her up at discharge,  plans on returning home        Continued Care and Services - Admitted Since 7/17/2021    Coordination has not been started for this encounter.         Demographic Summary     Row Name 07/19/21 1132       General Information    Admission Type  observation    Referral Source  admission list    Reason for Consult  discharge planning    Preferred Language  English         Functional Status     Row Name 07/19/21 1132       Functional Status    Usual Activity Tolerance  good    Current Activity Tolerance  moderate       Functional Status, IADL    Medications  independent    Meal Preparation  independent    Housekeeping  independent    Laundry  independent    Shopping  independent       Employment/    Employment Status  unemployed        Psychosocial    No documentation.       Abuse/Neglect    No documentation.       Legal     Row Name 07/19/21 1155       Financial/Legal    Source of Income  unemployment    Financial/Environmental Concerns  -- denies issues        Substance Abuse    No documentation.       Patient Forms    No documentation.           Jade Bronson RN

## 2021-07-19 NOTE — PLAN OF CARE
Problem: Adult Inpatient Plan of Care  Goal: Plan of Care Review  Outcome: Ongoing, Progressing  Flowsheets (Taken 7/19/2021 0620)  Plan of Care Reviewed With: patient  Outcome Summary: PT RESTED WEL THIS SHIFT.PO PAIN MEDICATION AS ORDERED FROM HOME EFFECTIVE.   Goal Outcome Evaluation:  Plan of Care Reviewed With: patient           Outcome Summary: PT RESTED WEL THIS SHIFT.PO PAIN MEDICATION AS ORDERED FROM HOME EFFECTIVE.

## 2021-07-19 NOTE — PROGRESS NOTES
"Adult Nutrition  Assessment/PES    Patient Name:  Chante Wetzel  YOB: 1965  MRN: [id number]  Admit Date:  7/17/2021    Assessment Date:  7/19/2021    Comments:    Recommend:  1. Continue current diet order as medically appropriate and tolerated.  2. Encourage PO intake. Average intake ~67% x 3 meals.   3. Consider a multivitamin with minerals daily.     RD to follow pt and available PRN.      Reason for Assessment     Row Name 07/19/21 0938          Reason for Assessment    Reason For Assessment  diagnosis/disease state;identified at risk by screening criteria     Diagnosis  cardiac disease;diabetes diagnosis/complications;other (see comments);pulmonary disease A/C resp failure, DM 2, COPD, cellulitis, CHF, hx DVT     Identified At Risk by Screening Criteria  BMI           Anthropometrics     Row Name 07/19/21 0940          Anthropometrics    Height  162.6 cm (64\")        Ideal Body Weight (IBW)    Ideal Body Weight (IBW) (kg)  55         Labs/Tests/Procedures/Meds     Row Name 07/19/21 0939          Labs/Procedures/Meds    Lab Results Reviewed  reviewed, pertinent     Lab Results Comments  High: BUN, Cr        Medications    Pertinent Medications Reviewed  reviewed, pertinent     Pertinent Medications Comments  Lipitor, Protonix, Metformin, Solumedrol, Lasix, ferrous sulfate         Physical Findings     Row Name 07/19/21 0940          Physical Findings    Overall Physical Appearance  obese     Skin  other (see comments) cellulitis         Estimated/Assessed Needs     Row Name 07/19/21 0940          Calculation Measurements    Weight Used For Calculations  136 kg (299 lb 13.2 oz)     Height  162.6 cm (64\")        Estimated/Assessed Needs    Additional Documentation  Fluid Requirements (Group);Calorie Requirements (Group);Protein Requirements (Group);KCAL/KG (Group)        Calorie Requirements    Estimated Calorie Requirement Comment  [id number]        KCAL/KG    KCAL/KG  Specify Kcal/Kg (kcal)     " kcal/kg (Specify)  22        Protein Requirements    Weight Used For Protein Calculations  136 kg (299 lb 13.2 oz)     Est Protein Requirement Amount (gms/kg)  1.2 gm protein 136-163 grams     Estimated Protein Requirements (gms/day)  163.2        Fluid Requirements    Fluid Requirements (mL/day)  1500     RDA Method (mL)  1500         Nutrition Prescription Ordered     Row Name 07/19/21 0941          Nutrition Prescription PO    Current PO Diet  Regular     Common Modifiers  Cardiac;Consistent Carbohydrate;Renal         Evaluation of Received Nutrient/Fluid Intake     Row Name 07/19/21 0940          PO Evaluation    Number of Days PO Intake Evaluated  2 days     Number of Meals  3     % PO Intake  67               Problem/Interventions:  Problem 1     Row Name 07/19/21 0941          Nutrition Diagnoses Problem 1    Problem 1  Overweight/Obesity     Etiology (related to)  Factors Affecting Nutrition     Food Habit/Preferences  Large Meals     Signs/Symptoms (evidenced by)  BMI     BMI  Greater than 40         Problem 2     Row Name 07/19/21 0942          Nutrition Diagnoses Problem 2    Problem 2  Increased Nutrient Needs     Macronutrient  Protein     Micronutrient  Vitamin;Mineral     Etiology (related to)  Medical Diagnosis     Pulmonary/Critical Care  A/C respiratory failure;COPD     Skin  Cellulitis     Signs/Symptoms (evidenced by)  Report/Observation     Reported/Observed By  MD             Intervention Goal     Row Name 07/19/21 0942          Intervention Goal    General  Meet nutritional needs for age/condition;Improved nutrition related lab(s)     PO  Meet estimated needs;Increase intake;PO intake (%)     PO Intake %  75 %     Weight  No significant weight loss         Nutrition Intervention     Row Name 07/19/21 0942          Nutrition Intervention    RD/Tech Action  Follow Tx progress;Encourage intake         Nutrition Prescription     Row Name 07/19/21 0943          Nutrition Prescription PO    PO  Prescription  Other (comment) Continue diet as tolerate     New PO Prescription Ordered?  No, recommended        Other Orders    Obtain Weight  Daily     Obtain Weight Ordered?  No, recommended     Supplement  Vitamin mineral supplement     Supplement Ordered?  No, recommended         Education/Evaluation     Row Name 07/19/21 0943          Education    Education  Will Instruct as appropriate        Monitor/Evaluation    Monitor  Per protocol;I&O;PO intake;Pertinent labs;Weight;Skin status           Electronically signed by:  Gali Gonzalez RD  07/19/21 09:43 EDT

## 2021-07-19 NOTE — PROGRESS NOTES
[facility]    Name:  Chante Wetzel   Age:  56 y.o.  Sex:  female  :  1965  MRN:  [id number]   Visit Number:  [id number]  Admission Date:  2021  Date Of Service:  21  Primary Care Physician:  Soumya Muñoz APRN     LOS: 1 day :    Chief Complaint:      Dyspnea, upper back cellulitis    Subjective:    Patient seen and evaluated today.  States that she is breathing better today.  States that she has had her ultrasound and echo.  Currently using 5L of oxygen which is up from her chronic 3L at home.  She is followed by Dr. Mccormack outpatient who just increased her from 2L to 3L.  She complains of right lower extremity edema which she says she has had since her blood blot.      Hospital Course:  This 56-year-old female has a history of COPD and congestive heart failure who developed cough and increasing shortness of breath was seen by her physician and started on Levaquin and steroids 4 days ago just prior to that she had been exposed to her son who had a croupy cough.  He has a cough marked shortness of breath minimal sputum production and no fever no chest pain.  She has nasal cannula home (3L) oxygen and uses nocturnal Bipap as well.  Respiratory panel revealed coronavirus OC 43, the pro calcitonin was 0.11 creatinine was 1.31, proBNP was 8744, troponin was less than 0.01 lactic acid was 2.3.      Review of Systems:     All systems were reviewed and negative except as mentioned in subjective, assessment and plan.    Vital Signs:    Temp:  [97.4 °F (36.3 °C)-98.2 °F (36.8 °C)] 97.7 °F (36.5 °C)  Heart Rate:  [59-70] 70  Resp:  [18-20] 18  BP: (114-144)/(58-77) 121/76    Intake and output:    I/O last 3 completed shifts:  In: 1280 [P.O.:1280]  Out: 2600 [Urine:2600]  I/O this shift:  In: 480 [P.O.:480]  Out: -     Physical Examination:    General Appearance:  Alert and cooperative, sitting up in bed on exam with no distress noted.  Pleasant middle aged  female, morbidly obese.   Head:  Atraumatic and normocephalic.   Eyes: Conjunctivae and sclerae normal, no icterus. No pallor.   Throat: No oral lesions, no thrush, oral mucosa moist.   Neck: Supple, trachea midline   Lungs:   Breath sounds heard bilaterally equally but diminished throughout.  No crackles or wheezing appreciated.  Unlabored in conversation, currently utilizing 5L of O2   Heart:  Normal S1 and S2, no murmur, no gallop, no rub. No JVD.   Abdomen:   Normal bowel sounds, no masses, no organomegaly. Soft, nontender, nondistended, no rebound tenderness.   Extremities: Supple, 1+ edema to right lower extremity with chronic venous stasis changes, no cyanosis, no clubbing.   Skin: No bleeding or rash.   Neurologic: Alert and oriented x 3. No facial asymmetry. Moves all four limbs.     Laboratory results:    Results from last 7 days   Lab Units 07/19/21  0558 07/18/21  0830 07/17/21  2234   SODIUM mmol/L 142 141 140   POTASSIUM mmol/L 4.1 4.5 5.1   CHLORIDE mmol/L 99 101 101   CO2 mmol/L 29.7* 26.8 26.0   BUN mg/dL 37* 26* 24*   CREATININE mg/dL 1.56* 1.32* 1.31*   CALCIUM mg/dL 9.9 9.9 9.6   BILIRUBIN mg/dL  --  0.9 1.0   ALK PHOS U/L  --  107 114   ALT (SGPT) U/L  --  24 26   AST (SGOT) U/L  --  22 29   GLUCOSE mg/dL 97 161* 106*     Results from last 7 days   Lab Units 07/19/21  0558 07/18/21  0830 07/17/21  2234   WBC 10*3/mm3 11.41* 7.05 8.77   HEMOGLOBIN g/dL 14.6 14.1 14.2   HEMATOCRIT % 46.6 45.2 45.5   PLATELETS 10*3/mm3 215 174 175         Results from last 7 days   Lab Units 07/17/21  2234   TROPONIN T ng/mL <0.010     Results from last 7 days   Lab Units 07/18/21  0157 07/18/21  0003   BLOODCX  No growth at 24 hours No growth at 24 hours     Results from last 7 days   Lab Units 07/17/21  2241   PH, ARTERIAL pH units 7.367   PO2 ART mm Hg 65.6*   PCO2, ARTERIAL mm Hg 51.6*   HCO3 ART mmol/L 29.6*     I have reviewed the patient's laboratory results.    Radiology results:    Adult Transthoracic Echo  Complete W/ Cont if Necessary Per Protocol    Result Date: 7/19/2021  1.  Normal left ventricular size and hyperdynamic LV systolic function, LVEF 70-75%. 2.  Indeterminate LV diastolic filling pattern. 3.  Moderate right ventricular dilation with normal RV systolic function by TAPSE. 4.  Moderate right atrial dilation. 5.  Mild tricuspid regurgitation. 6.  Severe pulmonary hypertension, RVSP 81 mmHg.    US Head Neck Soft Tissue    Result Date: 7/19/2021  PROCEDURE: US HEAD NECK SOFT TISSUE-  HISTORY: Cellulitis and possible mass posterior neck region; J96.21-Acute and chronic respiratory failure with hypoxia; J96.22-Acute and chronic respiratory failure with hypercapnia; J44.1-Chronic obstructive pulmonary disease with (acute) exacerbation; B34.2-Coronavirus infection, unspecified; I50.9-Heart failure, unspecified  COMPARISON: None.  TECHNIQUE: Sonographic images of the upper back were obtained in the transverse and sagittal planes.  FINDINGS: There is no evidence of a cystic or solid mass. There is no adenopathy.      Impression: Unremarkable exam.  This report was finalized on 7/19/2021 11:23 AM by Liam Ybarra M.D..    XR Chest 1 View    Result Date: 7/18/2021  PORTABLE CHEST    7/17/2021 10:43 PM  HISTORY: Shortness of air.  COMPARISON: May 2020.  FINDINGS: Bibasilar consolidation, worse in the left. No effusion or pneumothorax. No acute osseous abnormality.      Impression: Bibasilar consolidation, worse on the left. Pneumonia is favored. Follow-up two-view chest recommended.  This report was finalized on 7/18/2021 7:44 AM by Dr Rober Sarabia DO.    I have reviewed the patient's radiology reports.    Medication Review:     I have reviewed the patient's active and prn medications.     Problem List:      Acute on chronic respiratory failure with hypoxia and hypercapnia (CMS/HCC)    Obesity    Type 2 diabetes mellitus (CMS/HCC)    COPD (chronic obstructive pulmonary disease) (CMS/HCC)    Cor pulmonale  (chronic)    ARIANNA (obstructive sleep apnea)    Cellulitis    History of deep vein thrombosis (DVT) of lower extremity    Diastolic CHF, acute (CMS/[facility])      Assessment:    Acute on Chronic respiratory failure with hypoxia and hypercapnia  Morbid Obesity  Type 2 Diabetes Mellitus  COPD on 3L of home O2 chronically  ARIANNA   Cellulitis to upper back  History of DVT  Severe Pulmonary Hypertension    Plan:    Continue Rocephin and Doxycycline.  Creatinine increased today, will discontinue IV Vancomycin as Doxycycline will cover skin as well as MRSA pcr was negative.  Echo revealed LVEF-70-75% and severe pulmonary hypertension.  Continue supplemental oxygen, titrate to keep O2>90%.  Following sputum culture results, blood cultures without any growth.  Continue IV steroids and IV diuretics as ordered.  Patient had Ultrasound this morning of upper back noting no evidence of cystic or solid mass.  Appreciate Dr. Velásquez recommendations. Continue supportive care as ordered.  Bipap at night.  Discharge home when stable.       DVT Prophylaxis: Eliquis  Code Status: Full Code  Diet: Cardiac, Renal, Consistent Carb  Discharge Plan: Home when stable    JOHNNIE Tompkins  07/19/21  13:48 EDT    Dictated utilizing Dragon dictation.

## 2021-07-20 NOTE — PROGRESS NOTES
LOS: 1 day   Patient Care Team:  Soumya Muñoz APRN as PCP - General (Family Medicine)      Chief Complaint: Cellulitis posterior neck      Interval History: Patient doing well, no significant change. Ultrasound case no evidence of fluid collection or abscess in the neck region.    Patient Complaints: None    History taken from: patient    Vital Signs  Temp:  [97.3 °F (36.3 °C)-98.1 °F (36.7 °C)] 97.5 °F (36.4 °C)  Heart Rate:  [53-70] 60  Resp:  [18-20] 18  BP: (116-136)/(54-76) 125/54    Physical Exam:     General Appearance:    Alert, cooperative, in no acute distress   Head:    Normocephalic, without obvious abnormality, atraumatic   Lungs:     Clear to auscultation,respirations regular, even and                  unlabored    Heart:    Regular rhythm and normal rate, normal S1 and S2, no            murmur, no gallop, no rub, no click   Abdomen:     Normal bowel sounds, no masses, no organomegaly, soft        non-tender, non-distended, no guarding, no rebound                tenderness   Extremities:   Moves all extremities well, no edema, no cyanosis, no             redness   Pulses:   Pulses palpable and equal bilaterally   Skin:   No bleeding, bruising or rash. Persistent cellulitis and redness with warmth to the touch on the posterior neck region. No fluctuance.        Results Review:       Lab Results (last 24 hours)     Procedure Component Value Units Date/Time    Blood Culture With BASIL - Blood, Arm, Left [[id number]] Collected: 07/18/21 0157    Specimen: Blood from Arm, Left Updated: 07/20/21 0245     Blood Culture No growth at 2 days    Blood Culture With BASIL - Blood, Hand, Left [[id number]] Collected: 07/18/21 0003    Specimen: Blood from Hand, Left Updated: 07/20/21 0030     Blood Culture No growth at 2 days    POC Glucose Once [[id number]]  (Abnormal) Collected: 07/19/21 2014    Specimen: Blood Updated: 07/19/21 2051     Glucose 146 mg/dL      Comment: Serial Number: [id number]:  [id number]        POC Glucose Once [[id number]]  (Abnormal) Collected: 07/19/21 1627    Specimen: Blood Updated: 07/19/21 1630     Glucose 192 mg/dL      Comment: Serial Number: [id number]:  [id number]       POC Glucose Once [[id number]]  (Abnormal) Collected: 07/19/21 1058    Specimen: Blood Updated: 07/19/21 1141     Glucose 136 mg/dL      Comment: Serial Number: [id number]:  [id number]       Respiratory Culture - Sputum, Cough [[id number]] Collected: 07/18/21 1256    Specimen: Sputum from Cough Updated: 07/19/21 0954     Respiratory Culture Growth present, too young to evaluate     Gram Stain Less than 10 Epithelial cells per low power field      Greater than 20 WBCs per low power field      Rare (1+) Gram positive bacilli      Rare (1+) Gram negative bacilli      Rare (1+) Gram positive cocci in pairs              Assessment/Plan       Acute on chronic respiratory failure with hypoxia and hypercapnia (CMS/HCC)    Obesity    Type 2 diabetes mellitus (CMS/HCC)    COPD (chronic obstructive pulmonary disease) (CMS/HCC)    Cor pulmonale (chronic)    ARIANNA (obstructive sleep apnea)    Cellulitis    History of deep vein thrombosis (DVT) of lower extremity    Diastolic CHF, acute (CMS/HCC)      Cellulitis posterior neck, no evidence of abscess on the ultrasound of soft tissue. Recommend continue IV antibiotics and warm compresses. Will sign off unless condition changes.      Joseph Velásquez MD  07/20/21  05:53 EDT

## 2021-07-20 NOTE — PLAN OF CARE
Goal Outcome Evaluation:  Plan of Care Reviewed With: patient        Progress: improving  Outcome Summary: pleasant patient with complaint of chronic back pain-PRN medications given per orders-scheduled IV antibiotics per hospitalist's orders-Tahmina BLAIR saw patient today-monitor blood sugar with coverage per protocol-monitor labs and continue to monitor patient

## 2021-07-20 NOTE — PROGRESS NOTES
[facility]    Name:  Chante Wetzel   Age:  56 y.o.  Sex:  female  :  1965  MRN:  [id number]   Visit Number:  [id number]  Admission Date:  2021  Date Of Service:  21  Primary Care Physician:  Soumya Muñoz APRN     LOS: 1 day :    Chief Complaint:      Dyspnea/ upper back cellulitis    Subjective:    Patient seen and evaluated today.  States that she is breathing better, but is still requiring 5L to keep oxygen saturations at 90%.  Patient normally wears 3L and is followed by Dr. Mccormack.  Lower extremity edema is improved.  Patient has been ambulating in room.  Patient had good diuresis over 2 days with IV Lasix.  Upper back redness is unchanged, and patient states has been there for a couple months after seeing a chiropractor.    Hospital Course:    This 56-year-old female has a history of COPD and congestive heart failure who developed cough and increasing shortness of breath was seen by her physician and started on Levaquin and steroids 4 days ago just prior to that she had been exposed to her son who had a croupy cough.  He has a cough marked shortness of breath minimal sputum production and no fever no chest pain.  She has nasal cannula home (3L) oxygen and uses nocturnal Bipap as well.  Respiratory panel revealed coronavirus OC 43, the pro calcitonin was 0.11 creatinine was 1.31, proBNP was 8744, troponin was less than 0.01 lactic acid was 2.3.    Review of Systems:     All systems were reviewed and negative except as mentioned in subjective, assessment and plan.    Vital Signs:    Temp:  [97.5 °F (36.4 °C)-98.1 °F (36.7 °C)] 97.5 °F (36.4 °C)  Heart Rate:  [41-87] 87  Resp:  [17-18] 18  BP: (125-136)/(54-70) 127/64    Intake and output:    I/O last 3 completed shifts:  In: 2106 [P.O.:1640; IV Piggyback:466]  Out: 3850 [Urine:3850]  I/O this shift:  In: -   Out: 650 [Urine:650]    Physical Examination:    General Appearance:  Alert and  cooperative, resting in bed on exam, pleasant in conversation with no distress noted.   Head:  Atraumatic and normocephalic.   Eyes: Conjunctivae and sclerae normal, no icterus. No pallor.   Throat: No oral lesions, no thrush, oral mucosa moist.   Neck: Supple, trachea midline   Lungs:   Breath sounds heard bilaterally equally.  No crackles or wheezing. Unlabored in conversation, currently utilizing 5L   Heart:  Normal S1 and S2, no murmur, no gallop, no rub. No JVD.   Abdomen:   Normal bowel sounds, no masses, no organomegaly. Soft, nontender, nondistended, no rebound tenderness.   Extremities: Supple, right lower extremity edema present but improved with chronic venous stasis changes to right lower leg, no cyanosis, no clubbing.   Skin: No bleeding or rash. Upper back with circular reddened area in middle of upper thoracic, warm to touch, nontender   Neurologic: Alert and oriented x 3. No facial asymmetry. Moves all four limbs. No tremors.      Laboratory results:    Results from last 7 days   Lab Units 07/20/21  0610 07/19/21  0558 07/18/21  0830 07/17/21  2234   SODIUM mmol/L 140 142 141 140   POTASSIUM mmol/L 4.3 4.1 4.5 5.1   CHLORIDE mmol/L 96* 99 101 101   CO2 mmol/L 34.6* 29.7* 26.8 26.0   BUN mg/dL 43* 37* 26* 24*   CREATININE mg/dL 1.44* 1.56* 1.32* 1.31*   CALCIUM mg/dL 10.3 9.9 9.9 9.6   BILIRUBIN mg/dL 0.6  --  0.9 1.0   ALK PHOS U/L 100  --  107 114   ALT (SGPT) U/L 24  --  24 26   AST (SGOT) U/L 17  --  22 29   GLUCOSE mg/dL 143* 97 161* 106*     Results from last 7 days   Lab Units 07/20/21  0610 07/19/21  0558 07/18/21  0830   WBC 10*3/mm3 9.88 11.41* 7.05   HEMOGLOBIN g/dL 15.6 14.6 14.1   HEMATOCRIT % 49.5* 46.6 45.2   PLATELETS 10*3/mm3 245 215 174         Results from last 7 days   Lab Units 07/17/21  2234   TROPONIN T ng/mL <0.010     Results from last 7 days   Lab Units 07/18/21  0157 07/18/21  0003   BLOODCX  No growth at 2 days No growth at 2 days     Results from last 7 days   Lab Units  07/17/21 2241   PH, ARTERIAL pH units 7.367   PO2 ART mm Hg 65.6*   PCO2, ARTERIAL mm Hg 51.6*   HCO3 ART mmol/L 29.6*     I have reviewed the patient's laboratory results.    Radiology results:    Adult Transthoracic Echo Complete W/ Cont if Necessary Per Protocol    Result Date: 7/19/2021  1.  Normal left ventricular size and hyperdynamic LV systolic function, LVEF 70-75%. 2.  Indeterminate LV diastolic filling pattern. 3.  Moderate right ventricular dilation with normal RV systolic function by TAPSE. 4.  Moderate right atrial dilation. 5.  Mild tricuspid regurgitation. 6.  Severe pulmonary hypertension, RVSP 81 mmHg.    US Head Neck Soft Tissue    Result Date: 7/19/2021  PROCEDURE: US HEAD NECK SOFT TISSUE-  HISTORY: Cellulitis and possible mass posterior neck region; J96.21-Acute and chronic respiratory failure with hypoxia; J96.22-Acute and chronic respiratory failure with hypercapnia; J44.1-Chronic obstructive pulmonary disease with (acute) exacerbation; B34.2-Coronavirus infection, unspecified; I50.9-Heart failure, unspecified  COMPARISON: None.  TECHNIQUE: Sonographic images of the upper back were obtained in the transverse and sagittal planes.  FINDINGS: There is no evidence of a cystic or solid mass. There is no adenopathy.      Impression: Unremarkable exam.  This report was finalized on 7/19/2021 11:23 AM by Liam Ybarra M.D..    I have reviewed the patient's radiology reports.    Medication Review:     I have reviewed the patient's active and prn medications.     Problem List:      Acute on chronic respiratory failure with hypoxia and hypercapnia (CMS/HCC)    Obesity    Type 2 diabetes mellitus (CMS/HCC)    COPD (chronic obstructive pulmonary disease) (CMS/HCC)    Cor pulmonale (chronic)    ARIANNA (obstructive sleep apnea)    Cellulitis    History of deep vein thrombosis (DVT) of lower extremity    Diastolic CHF, acute (CMS/[facility])      Assessment:    Acute on Chronic respiratory failure with hypoxia  and hypercapnia  Morbid Obesity  Type 2 Diabetes Mellitus  COPD on 3L of home O2 chronically  ARIANNA   Cellulitis to upper back  History of DVT  Severe Pulmonary Hypertension    Plan:    Continue Rocephin and Doxycycline.  Creatinine better today at 1.4 with baseline 1.3.  IV Vancomycin discontinued as Doxycycline will cover skin as well and MRSA pcr was negative.  Echo revealed LVEF-70-75% and severe pulmonary hypertension. Continue supplemental oxygen, titrate to keep O2>90%, patient currently requiring 5L.  Following sputum culture results, blood cultures without any growth.  Transition to Prednisone 40mg PO tomorrow. Recheck procalcitonin in the am and basic labs, with walking oximetry test. Patient had Ultrasound of upper back noting no evidence of cystic or solid mass.  Appreciate Dr. Velásquez recommendations. Continue supportive care as ordered.  Bipap at night.  Discharge home when stable.     DVT Prophylaxis: Eliquis  Code Status: Full Code  Diet: Cardiac, Renal, Consistent Carb  Discharge Plan: Home when stable      JOHNNIE Tompkins  07/20/21  15:01 EDT    Dictated utilizing Dragon dictation.

## 2021-07-21 PROBLEM — J18.9 CAP (COMMUNITY ACQUIRED PNEUMONIA): Status: ACTIVE | Noted: 2021-01-01

## 2021-07-21 NOTE — CASE MANAGEMENT/SOCIAL WORK
Case Management Discharge Note      Final Note: discharged home    Provided Post Acute Provider List?: Refused  Provided Post Acute Provider Quality & Resource List?: Refused    Selected Continued Care - Admitted Since 7/17/2021     Destination    No services have been selected for the patient.              Durable Medical Equipment Coordination complete.    Service Provider Selected Services Address Phone Fax Patient Preferred    [facility]  Durable Medical Equipment [address] [place] [address] DR SORTO [address] [place] [id number] [phone] --          Dialysis/Infusion    No services have been selected for the patient.              Home Medical Care    No services have been selected for the patient.              Therapy    No services have been selected for the patient.              Community Resources    No services have been selected for the patient.              Community & DME    No services have been selected for the patient.                  Transportation Services  Private: Car    Final Discharge Disposition Code: 01 - home or self-care

## 2021-07-21 NOTE — PROGRESS NOTES
Adult Nutrition  Assessment/PES    Patient Name:  Chante Wetzel  YOB: 1965  MRN: [id number]  Admit Date:  7/17/2021    Assessment Date:  7/21/2021    Comments:    Recommend:  1. Continue current diet order as medically appropriate and tolerated.  2. Encourage PO intake. Average intake ~75% x 9 meals.   3. Consider a multivitamin with minerals daily.     RD to follow pt and available PRN.    Reason for Assessment     Row Name 07/21/21 1254          Reason for Assessment    Reason For Assessment  follow-up protocol     Diagnosis  cardiac disease;diabetes diagnosis/complications;other (see comments);pulmonary disease A/C resp failure, DM 2, COPD, cellulitis, CHF, hx DVT     Identified At Risk by Screening Criteria  BMI             Labs/Tests/Procedures/Meds     Row Name 07/21/21 1254          Labs/Procedures/Meds    Lab Results Reviewed  reviewed, pertinent     Lab Results Comments  High: BUN, Cr        Medications    Pertinent Medications Reviewed  reviewed, pertinent     Pertinent Medications Comments  Lipitor, Bumex, ferrous sulfate, metformin, Protonix, Prednisone,         Physical Findings     Row Name 07/21/21 1255          Physical Findings    Overall Physical Appearance  obese     Skin  other (see comments) cellulitis           Nutrition Prescription Ordered     Row Name 07/21/21 1255          Nutrition Prescription PO    Current PO Diet  Regular     Common Modifiers  Cardiac;Consistent Carbohydrate;Renal         Evaluation of Received Nutrient/Fluid Intake     Row Name 07/21/21 1255          PO Evaluation    Number of Days PO Intake Evaluated  3 days     Number of Meals  9     % PO Intake  75               Problem/Interventions:  Problem 1     Row Name 07/21/21 1256          Nutrition Diagnoses Problem 1    Problem 1  Overweight/Obesity     Etiology (related to)  Factors Affecting Nutrition     Food Habit/Preferences  Large Meals     Signs/Symptoms (evidenced by)  BMI     BMI  Greater than  40         Problem 2     Row Name 07/21/21 1256          Nutrition Diagnoses Problem 2    Problem 2  Increased Nutrient Needs     Macronutrient  Protein     Micronutrient  Vitamin;Mineral     Etiology (related to)  Medical Diagnosis     Pulmonary/Critical Care  A/C respiratory failure;COPD     Skin  Cellulitis     Signs/Symptoms (evidenced by)  Report/Observation     Reported/Observed By  MD             Intervention Goal     Row Name 07/21/21 1257          Intervention Goal    General  Improved nutrition related lab(s);Meet nutritional needs for age/condition     PO  Meet estimated needs;Maintain intake     Weight  No significant weight loss         Nutrition Intervention     Row Name 07/21/21 1257          Nutrition Intervention    RD/Tech Action  Follow Tx progress;Encourage intake         Nutrition Prescription     Row Name 07/21/21 1257          Nutrition Prescription PO    PO Prescription  Other (comment) Continue diet as tolerated     New PO Prescription Ordered?  No, recommended        Other Orders    Obtain Weight  Daily     Obtain Weight Ordered?  No, recommended     Supplement  Vitamin mineral supplement     Supplement Ordered?  No, recommended         Education/Evaluation     Row Name 07/21/21 1257          Education    Education  Will Instruct as appropriate        Monitor/Evaluation    Monitor  Per protocol;I&O;PO intake;Skin status;Weight           Electronically signed by:  Gali Gonzalez RD  07/21/21 12:57 EDT

## 2021-07-21 NOTE — DISCHARGE SUMMARY
[facility]   DISCHARGE SUMMARY      Name:  Chante Wetzel   Age:  56 y.o.  Sex:  female  :  1965  MRN:  [id number]   Visit Number:  [id number]    Admission Date:  2021  Date of Discharge:  2021  Primary Care Physician:  Soumya Muñoz APRN    Important issues to note:    1.  Admitted to the hospital with COPD exacerbation, pneumonia and possible cellulitis to upper back.    2.  Started on Vancomycin, Rocephin, and Doxycycline therapy for treatment.  Given IV Lasix and steroids as well.    3.  Baseline use of 3L of O2 will go home utilizing 4L at rest and 6L with exertion.  Will need to finish course of antibiotics for pneumonia--Doxycycline and Cefdinir at discharge as well as Prednisone burst.        Discharge Diagnoses:     1.  Acute on Chronic respiratory failure with hypoxia and hypercapnia  2.  Community acquired pneumonia  3.  Morbid Obesity  4.  Type 2 Diabetes Mellitus  5.  COPD on 3L of home O2 chronically  6.  ARIANNA   7.  Possible Cellulitis to upper back  8.  History of DVT  9.  Severe Pulmonary Hypertension    Problem List:     Active Hospital Problems    Diagnosis  POA   • **Acute on chronic respiratory failure with hypoxia and hypercapnia (CMS/HCC) [J96.21, J96.22]  Yes   • CAP (community acquired pneumonia) [J18.9]  Yes   • ARIANNA (obstructive sleep apnea) [G47.33]  Yes   • Cellulitis [L03.90]  Yes   • History of deep vein thrombosis (DVT) of lower extremity [Z86.718]  Not Applicable   • Diastolic CHF, acute (CMS/HCC) [I50.31]  Yes   • Cor pulmonale (chronic) [I27.81]  Yes   • COPD (chronic obstructive pulmonary disease) (CMS/HCC) [J44.9]  Yes   • Obesity [E66.9]  Yes   • Type 2 diabetes mellitus (CMS/HCC) [E11.9]  Yes      Resolved Hospital Problems   No resolved problems to display.     Presenting Problem:    Chief Complaint   Patient presents with   • Shortness of Breath      Consults:     Consulting Physician(s)  Chat With All Active Members    Provider  Relationship Specialty    Joseph Velásquez MD  Consulting Physician General Surgery    Ilana Fine MD  Consulting Physician Internal Medicine        History of presenting illness/Hospital Course:    Patient is a 56-year-old female with a health history significant for COPD with home oxygen use, diabetes mellitus, history of DVT on chronic anticoagulation, CKD, ARIANNA, hypertension, and congestive heart failure.  Patient developed cough and increasing shortness of breath and was seen by her PCP.  She was started on Levaquin and steroids with known exposure to her son who had a croupy cough.  She complained of a cough, marked shortness of breath, and minimal sputum production with no fever and no chest pain.  She has nasal cannula home oxygen use at 3L and uses nocturnal positive pressure ventilation as well. Respiratory panel revealed coronavirus OC 43,  procalcitonin was 0.11, creatinine was 1.31, proBNP was 8744, troponin was WNL, and lactic acid was 2.3.  CXR revealed bilateral pneumonia.  Patient was admitted to the hospital for further treatment.  Patient was also noted to have large 12x12 area to upper back that was thought to be cellulitis vs cyst on admission.    Patient was admitted to the hospital and started on Rocephin, Vancomycin, and Doxycycline for antibiotic therapy.  She received IV Lasix as she appeared to be volume overloaded on admission, and IV steroids.  She was using 5L to maintain oxygen saturations on admission.  MRSA pcr was negative, and sputum culture was negative for MRSA or pseudomonas.  Blood cultures remained with no growth.  Breathing significantly improved with antibiotics and steroids.  Echo was done and noted LVEF-70-75% and severe pulmonary hypertension.  She is requiring 4L of oxygen today at rest and states she is breathing much better.  She has also been using her home Cpap machine while admitted.  Patient will be discharged home with Cefdinir and Doxycycline for  additional 4 days to complete 7 day course of antibiotics as well as Prednisone 40mg burst for additional 4 days.    Large area to upper back does not appear to be responding to IV/PO  antibiotics.  Patient explains that it has been present for a few month.  It is not painful on palpation and there is no noted induration.  Patient has been afebrile. Ultrasound confirmed there was no mass or fluid collection to drain.  Dr. Velásquez with surgery was consulted and ultimately signed off without any intervention needed.  Patient states she did know it was there until her family member noticed it one day a couple months ago.  Would recommend continued outpatient follow up with punch biopsy for further evaluation coordinated through PCP.      Patient stable for discharge home today.  Lab work is stable with creatinine at baseline 1.3 today.  Slight increase in WBC-12 today likely from steroids and procalcitonin-0.07 today.  Patient will follow up with PCP Friday for a recheck.  Return to the hospital with any increased shortness of breath/ oxygen use, chest pains, palpitations, or other problems.  Recommend follow up with Dr. Mccormack sooner than scheduled.          Vital Signs:    Temp:  [96.2 °F (35.7 °C)-98.2 °F (36.8 °C)] 97.3 °F (36.3 °C)  Heart Rate:  [55-71] 66  Resp:  [17-18] 18  BP: (118-145)/(62-70) 125/70    Physical Exam:    General Appearance:  Alert and cooperative, resting in bed on exam with no distress noted, pleasant in conversation.   Head:  Atraumatic and normocephalic.   Eyes: Conjunctivae and sclerae normal, no icterus. No pallor.   Ears:  Ears with no abnormalities noted.   Throat: No oral lesions, no thrush, oral mucosa moist.   Neck: Supple, trachea midline   Back:   No kyphoscoliosis present. No tenderness to palpation.   Lungs:   Breath sounds heard bilaterally equally.  No crackles right posterior trace expiratory wheezing, utilizing 4L O2 at rest    Heart:  Normal S1 and S2, no murmur, no gallop, no  rub. No JVD.   Abdomen:   Normal bowel sounds, no masses, no organomegaly. Soft, nontender, nondistended, no rebound tenderness.   Extremities: Supple, chronic right lower extremity edema present but improved with chronic venous stasis changes to right lower leg , no cyanosis, no clubbing.   Pulses: Pulses palpable bilaterally.   Skin: No bleeding or rash, large circular reddened area in middle of upper thoracic back, warm to touch, nontender on palpation   Neurologic: Alert and oriented x 3. No facial asymmetry. Moves all four limbs.      Pertinent Lab Results:     Results from last 7 days   Lab Units 07/21/21  0524 07/20/21  0610 07/19/21  0558 07/18/21  0830 07/18/21  0830   SODIUM mmol/L 141 140 142   < > 141   POTASSIUM mmol/L 4.4 4.3 4.1   < > 4.5   CHLORIDE mmol/L 99 96* 99   < > 101   CO2 mmol/L 31.3* 34.6* 29.7*   < > 26.8   BUN mg/dL 46* 43* 37*   < > 26*   CREATININE mg/dL 1.38* 1.44* 1.56*   < > 1.32*   CALCIUM mg/dL 9.7 10.3 9.9   < > 9.9   BILIRUBIN mg/dL 0.7 0.6  --   --  0.9   ALK PHOS U/L 102 100  --   --  107   ALT (SGPT) U/L 27 24  --   --  24   AST (SGOT) U/L 24 17  --   --  22   GLUCOSE mg/dL 73 143* 97   < > 161*    < > = values in this interval not displayed.     Results from last 7 days   Lab Units 07/21/21  0524 07/20/21  0610 07/19/21  0558   WBC 10*3/mm3 12.83* 9.88 11.41*   HEMOGLOBIN g/dL 16.7* 15.6 14.6   HEMATOCRIT % 54.4* 49.5* 46.6   PLATELETS 10*3/mm3 239 245 215         Results from last 7 days   Lab Units 07/17/21  2234   TROPONIN T ng/mL <0.010     Results from last 7 days   Lab Units 07/17/21  2234   PROBNP pg/mL 8,744.0*             Results from last 7 days   Lab Units 07/17/21  2241   PH, ARTERIAL pH units 7.367   PO2 ART mm Hg 65.6*   PCO2, ARTERIAL mm Hg 51.6*   HCO3 ART mmol/L 29.6*     Results from last 7 days   Lab Units 07/18/21  0157 07/18/21  0003   BLOODCX  No growth at 3 days No growth at 3 days       Pertinent Radiology Results:    Imaging Results (All)      Procedure Component Value Units Date/Time    US Head Neck Soft Tissue [[id number]] Collected: 07/19/21 1123     Updated: 07/19/21 1125    Narrative:      PROCEDURE: US HEAD NECK SOFT TISSUE-     HISTORY: Cellulitis and possible mass posterior neck region;  J96.21-Acute and chronic respiratory failure with hypoxia; J96.22-Acute  and chronic respiratory failure with hypercapnia; J44.1-Chronic  obstructive pulmonary disease with (acute) exacerbation;  B34.2-Coronavirus infection, unspecified; I50.9-Heart failure,  unspecified     COMPARISON: None.     TECHNIQUE: Sonographic images of the upper back were obtained in the  transverse and sagittal planes.     FINDINGS: There is no evidence of a cystic or solid mass. There is no  adenopathy.       Impression:      Unremarkable exam.     This report was finalized on 7/19/2021 11:23 AM by Liam Ybarra M.D..    XR Chest 1 View [[id number]] Collected: 07/18/21 0743     Updated: 07/18/21 0746    Narrative:      PORTABLE CHEST    7/17/2021 10:43 PM      HISTORY: Shortness of air.     COMPARISON: May 2020.     FINDINGS: Bibasilar consolidation, worse in the left. No effusion or  pneumothorax. No acute osseous abnormality.       Impression:      Bibasilar consolidation, worse on the left. Pneumonia is  favored. Follow-up two-view chest recommended.     This report was finalized on 7/18/2021 7:44 AM by Dr Rober Sarabia DO.          Echo:    Results for orders placed during the hospital encounter of 07/17/21    Adult Transthoracic Echo Complete W/ Cont if Necessary Per Protocol    Interpretation Summary  1.  Normal left ventricular size and hyperdynamic LV systolic function, LVEF 70-75%.  2.  Indeterminate LV diastolic filling pattern.  3.  Moderate right ventricular dilation with normal RV systolic function by TAPSE.  4.  Moderate right atrial dilation.  5.  Mild tricuspid regurgitation.  6.  Severe pulmonary hypertension, RVSP 81 mmHg.    Condition on Discharge:       Stable.    Code status during the hospital stay:    Code Status and Medical Interventions:   Ordered at: 07/18/21 0248     Level Of Support Discussed With:    Patient     Code Status:    CPR     Medical Interventions (Level of Support Prior to Arrest):    Full     Discharge Disposition:        Discharge Medications:       Discharge Medications      New Medications      Instructions Start Date   benzonatate 200 MG capsule  Commonly known as: TESSALON   200 mg, Oral, 3 Times Daily PRN      cefdinir 300 MG capsule  Commonly known as: OMNICEF   300 mg, Oral, 2 Times Daily      doxycycline 100 MG capsule  Commonly known as: MONODOX   100 mg, Oral, Every 12 Hours Scheduled      predniSONE 20 MG tablet  Commonly known as: DELTASONE   40 mg, Oral, Daily With Breakfast   Start Date: July 22, 2021        Changes to Medications      Instructions Start Date   Anoro Ellipta 62.5-25 MCG/INH aerosol powder  inhaler  Generic drug: umeclidinium-vilanterol  What changed: when to take this   1 puff, Inhalation, Daily      pregabalin 150 MG capsule  Commonly known as: LYRICA  What changed: Another medication with the same name was removed. Continue taking this medication, and follow the directions you see here.   150 mg, Oral, 2 Times Daily         Continue These Medications      Instructions Start Date   bumetanide 1 MG tablet  Commonly known as: BUMEX   1 mg, Oral, Daily      diclofenac 1 % gel gel  Commonly known as: VOLTAREN   APPLY 2 GRAMS TOPICALLY QID PRN P      Eliquis 2.5 MG tablet tablet  Generic drug: apixaban   TAKE 1 TABLET BY MOUTH EVERY 12 HOURS      ferrous sulfate 325 (65 FE) MG EC tablet   TAKE 1 TABLET BY MOUTH ONCE DAILY WITH BREAKFAST      fluticasone 50 MCG/ACT nasal spray  Commonly known as: Flonase   1 spray, Nasal, Daily      glimepiride 4 MG tablet  Commonly known as: AMARYL   take 1 tablet by mouth every morning BEFORE BREAKFAST      HYDROcodone-acetaminophen 7.5-325 MG per tablet  Commonly known as:  NORCO   Every 8 Hours      ipratropium-albuterol 0.5-2.5 mg/3 ml nebulizer  Commonly known as: DUO-NEB   3 mL, Nebulization, 4 Times Daily PRN      lisinopril 5 MG tablet  Commonly known as: PRINIVIL,ZESTRIL   take 1 tablet by mouth once daily      loratadine 10 MG tablet  Commonly known as: CLARITIN   Oral, Daily      metFORMIN  MG 24 hr tablet  Commonly known as: GLUCOPHAGE-XR   1 ac breakfast and supper      metoprolol tartrate 25 MG tablet  Commonly known as: LOPRESSOR   Take 0.5 tablets by mouth 2 times daily      montelukast 10 MG tablet  Commonly known as: SINGULAIR   10 mg, Oral, Nightly      omeprazole 40 MG capsule  Commonly known as: priLOSEC   TAKE 1 CAPSULE BY MOUTH EVERY MORNING BEFORE BREAKFAST      ONE TOUCH ULTRA MINI w/Device kit   use as directed      ONE TOUCH ULTRA MINI w/Device kit   use as directed      Stool Softener 100 MG capsule  Generic drug: docusate sodium   TK ONE C PO BID      tiZANidine 4 MG tablet  Commonly known as: ZANAFLEX   take 1 tablet by mouth every 8 hours if needed for muscle spasm      vitamin D 1.25 MG (82388 UT) capsule capsule  Commonly known as: ERGOCALCIFEROL   take 1 capsule by mouth every week         Stop These Medications    atorvastatin 40 MG tablet  Commonly known as: LIPITOR          Discharge Diet:     Diet Instructions     Advance Diet As Tolerated          Activity at Discharge:     Activity Instructions     Activity as Tolerated      Gradually Increase Activity Until at Pre-Hospitalization Level          Follow-up Appointments:    Follow-up Information     Soumya Muñoz APRN Follow up.    Specialty: Family Medicine  Why: Please schedule appointment for patient to be seen Friday or Monday for follow up prior to discharge.  Contact information:  [address] [place] [address] [phone]             Yuridia Mccormack MD Follow up.    Specialties: Pulmonary Disease, Sleep Medicine  Why: Call to be seen sooner than next scheduled appointment.  Contact  information:  [address]  [address]  [facility] [address]  [phone]                 Future Appointments   Date Time Provider Department Center   7/26/2021  1:00 PM [facility] ECHO RM [facility]   11/8/2021  1:45 PM [facility] PULM [facility] - PFT RM [facility]   11/8/2021  2:30 PM Augustina Palma APRN [facility]     Test Results Pending at Discharge:    Pending Labs     Order Current Status    Blood Culture With BASIL - Blood, Arm, Left Preliminary result    Blood Culture With BASIL - Blood, Hand, Left Preliminary result             JOHNNIE Tompkins  07/21/21  14:09 EDT    Time: I spent 28 minutes on this discharge activity which included: face-to-face encounter with the patient, reviewing the data in the system, coordination of the care with the nursing staff as well as consultants, documentation, and entering orders.     Dictated utilizing Dragon dictation.

## 2021-07-21 NOTE — DISCHARGE INSTRUCTIONS
You are being discharged home today.  You need to complete antibiotics for pneumonia and steroids for COPD/ pneumonia.  Follow up with Dr. Mccormack as scheduled.  Follow up with your PCP Friday for a recheck.  You need to use 4L of oxygen at rest and 6L with any exertion.  Continue home bipap.  Follow up with your PCP about potential punch biopsy to upper back for further evaluation.  Return to the hospital with any increased shortness of breath, chest pain, palpitations, or other problems.

## 2021-07-21 NOTE — PROGRESS NOTES
Exercise Oximetry    Patient Name:Chante Wetzel   MRN: [id number]   Date: 07/21/21             ROOM AIR BASELINE   SpO2%  86   Heart Rate 66   Blood Pressure      EXERCISE ON ROOM AIR SpO2% EXERCISE ON O2 @ 6 LPM SpO2%   1 MINUTE  1 MINUTE 90   2 MINUTES  2 MINUTES 90   3 MINUTES  3 MINUTES 87   4 MINUTES  4 MINUTES 87   5 MINUTES  5 MINUTES 88   6 MINUTES  6 MINUTES 90              Distance Walked   Distance Walked 75ft   Dyspnea (Hiro Scale)   Dyspnea (Hiro Scale) 6   Fatigue (Hiro Scale)   Fatigue (Hiro Scale) 5   SpO2% Post Exercise   SpO2% Post Exercise 90   HR Post Exercise   HR Post Exercise 69   Time to Recovery   Time to Recovery 2 mins     Comments: At time of visit patient was on 4L NC 90%; Placed patient on Room Air at rest and desatted 86%. I placed patient back on 4L NC, and proceeded to walk patient, after 3 mins patient dessated 85%. Placed patient back in 6L NC SpO2 increased to 90%.

## 2021-07-21 NOTE — PLAN OF CARE
Goal Outcome Evaluation:  Plan of Care Reviewed With: patient        Progress: improving  Outcome Summary: pleasant patient with complaint of chronic back pain-medications given per hospitalist's orders-Tahmina BLAIR saw patient today-possible discharge home 07--monitor labs and continue to monitor patient

## 2021-07-22 NOTE — OUTREACH NOTE
Prep Survey      Responses   [facility] facility patient discharged from?  [facility]   Is LACE score < 7 ?  No   Emergency Room discharge w/ pulse ox?  No   Eligibility  Readm Mgmt   Discharge diagnosis  PNA/COPD   Does the patient have one of the following disease processes/diagnoses(primary or secondary)?  COPD/Pneumonia   Does the patient have Home health ordered?  No   Is there a DME ordered?  Yes   What DME was ordered?  O2   Comments regarding appointments  office to call   Prep survey completed?  Yes          Amber Goins RN

## 2021-07-23 NOTE — CARE COORDINATION
[facility] Transitions Initial Follow Up Call    Call within 2 business days of discharge: Yes     Patient: Mary Kay Aguila Patient : 1965 MRN: <[id number]>    Last Discharge [facility]       Complaint Diagnosis Description Type Department Provider    17   Admission (Discharged) [facility], [address] [place] [address]  21 COPD exacerbation       RARS: No data recorded     Spoke with: Preeti states that she is doing very well. She reports that she is only requiring 4 liters of oxygen with exertion and has been able to get around in the kitchen and make her meals. No issues with medication. [facility]       [facility]charge department/facility: [facility]    Non-face-to-face services provided:  Scheduled appointment with Holger  Obtained and reviewed discharge summary and/or continuity of care documents    Follow Up  Future Appointments   Date Time Provider Jennifer Gamez   2021  1:15 PM JACQUELYN Mcintyre - CNP [facility]   2021  3:30 PM Roscoe Alexandre, [address] [address]       Brayden Tejada RN

## 2021-07-24 NOTE — OUTREACH NOTE
COPD/PN Week 1 Survey      Responses   [facility] [place] patient discharged from?  [facility]   Does the patient have one of the following disease processes/diagnoses(primary or secondary)?  COPD/Pneumonia   Week 1 attempt successful?  No   Unsuccessful attempts  Attempt 1   Discharge diagnosis  PNA/COPD          Kim Rodriguez RN

## 2021-07-26 NOTE — PATIENT INSTRUCTIONS

## 2021-07-26 NOTE — PROGRESS NOTES
Post-Discharge Transitional Care Management Services or Hospital Follow Up      Yusef See   YOB: 1965    Date of Office Visit:  7/26/2021  Date of Hospital Admission: 7/17/21  Date of Hospital Discharge: 7/21/21  Readmission Risk Score(high >=14%.  Medium >=10%):No data recorded    Care management risk score Rising risk (score 2-5) and Complex Care (Scores >=6): 6     Non face to face  following discharge, date last encounter closed (first attempt may have been earlier):   7/23/2021 10:46 AM 7/23/2021 10:46 AM    Call initiated 2 business days of discharge: Yes     Patient Active Problem List   Diagnosis    Chronic neck and back pain    Carpal tunnel syndrome on both sides    Hypertension    Diverticulosis    Hypoxia    Edema    Anemia    Chronic anticoagulation    Pulmonary hypertension ([address]    Vitamin D deficiency    Reactive depression    [facility]lobular emphysema [address]    Type 2 diabetes mellitus with diabetic neuropathy, without long-term current use of insulin (HCC)    Nasal congestion       Allergies   Allergen Reactions    Erythromycin Other (See Comments)     \"needles sticking in my chest\"       Medications listed as ordered at the time of discharge from [facility] Medication Instructions ROBBIN:    Printed on:07/26/21 0661   Medication Information                      Albuterol Sulfate, sensor, (PROAIR DIGIHALER) 108 (90 Base) MCG/ACT AEPB  Inhale 1 puff into the lungs every 4 hours as needed (SOB)             atorvastatin (LIPITOR) 40 MG tablet  TAKE 1 TABLET BY MOUTH ONCE DAILY             benzonatate (TESSALON) 200 MG capsule  Take 200 mg by mouth 3 times daily as needed             Blood Glucose Monitoring Suppl (ONE TOUCH ULTRA MINI) w/Device KIT  use as directed             bumetanide (BUMEX) 1 MG tablet  TAKE 1 TABLET BY MOUTH EVERY DAY             diclofenac sodium (VOLTAREN) 1 % GEL  Apply 2 g topically 4 times daily as needed for Pain  MG capsule  TAKE ONE CAPSULE BY MOUTH TWICE DAILY             Elastic Bandages & Supports (CARPAL TUNNEL WRIST STABILIZER) MISC  Bilateral carpal tunnel splints for carpal tunnel syndrome bilaterally (Dx: G56.01, G56.02)             Elastic Bandages & Supports (KNEE BRACE) MISC  Right knee brace. Size XL. Elastic/compression. ELIQUIS 2.5 MG TABS tablet  TAKE 1 TABLET BY MOUTH EVERY 12 HOURS             fluticasone (FLONASE) 50 MCG/ACT nasal spray  2 sprays by Each Nostril route daily             glimepiride (AMARYL) 4 MG tablet  take 1 tablet by mouth every morning BEFORE BREAKFAST             HYDROcodone-acetaminophen (NORCO) 7.5-325 MG per tablet  Take 1 tablet by mouth every 8 hours as needed for Pain for up to 30 days. ipratropium-albuterol (DUONEB) 0.5-2.5 (3) MG/3ML SOLN nebulizer solution  Inhale 3 mLs into the lungs every 4 hours as needed for Shortness of Breath             Lancets (ONETOUCH DELICA PLUS [id number]) MISC  TEST TWICE DAILY             lisinopril (PRINIVIL;ZESTRIL) 5 MG tablet  TAKE 1 TABLET BY MOUTH ONCE DAILY             loratadine (CLARITIN) 10 MG tablet  TAKE 1 TABLET BY MOUTH EVERY DAY             metFORMIN (GLUCOPHAGE-XR) 500 MG extended release tablet  TAKE 1 TABLET BY MOUTH DAILY BEFORE BREAKFAST AND SUPPER             methylPREDNISolone (MEDROL DOSEPACK) 4 MG tablet  Take by mouth.             metoprolol tartrate (LOPRESSOR) 25 MG tablet  TAKE 1/2 TABLET BY MOUTH TWICE DAILY             montelukast (SINGULAIR) 10 MG tablet  take 1 tablet by mouth at bedtime             omeprazole (PRILOSEC) 40 MG delayed release capsule  TAKE 1 CAPSULE BY MOUTH EVERY MORNING BEFORE BREAKFAST             ONEUCH ULTRA strip  USE TO TEST BLOOD SUGAR TWICE A DAY             pregabalin (LYRICA) 150 MG capsule  Take 1 capsule by mouth 2 times daily for 60 days.  TAKE 1 CAPSULE BY MOUTH TWICE DAILY             tiZANidine (ZANAFLEX) 4 MG tablet  TAKE 1 TABLET BY MOUTH EVERY 8 HOURS.             triamcinolone (KENALOG) 0.1 % cream  APPLY TO AFFECTED AREA TWICE A DAY             umeclidinium-vilanterol (ANORO ELLIPTA) 62.5-25 MCG/INH AEPB inhaler  Inhale 1 puff into the lungs daily             vitamin D (ERGOCALCIFEROL) 1.25 MG (02437 UT) CAPS capsule  TAKE 1 CAPSULE BY MOUTH EVERY WEEK. Medications marked \"taking\" at this time  Outpatient Medications Marked as Taking for the 7/26/21 encounter (Office Visit) with JACQUELYN Beltran - CNP   Medication Sig Dispense Refill    benzonatate (TESSALON) 200 MG capsule Take 200 mg by mouth 3 times daily as needed      HYDROcodone-acetaminophen (NORCO) 7.5-325 MG per tablet Take 1 tablet by mouth every 8 hours as needed for Pain for up to 30 days.  90 tablet 0    Albuterol Sulfate, sensor, (PROAIR DIGIHALER) 108 (90 Base) MCG/ACT AEPB Inhale 1 puff into the lungs every 4 hours as needed (SOB) 1 each 5    ELIQUIS 2.5 MG TABS tablet TAKE 1 TABLET BY MOUTH EVERY 12 HOURS 60 tablet 3    loratadine (CLARITIN) 10 MG tablet TAKE 1 TABLET BY MOUTH EVERY DAY 30 tablet 5    ipratropium-albuterol (DUONEB) 0.5-2.5 (3) MG/3ML SOLN nebulizer solution Inhale 3 mLs into the lungs every 4 hours as needed for Shortness of Breath 360 mL 5    glimepiride (AMARYL) 4 MG tablet take 1 tablet by mouth every morning BEFORE BREAKFAST 30 tablet 5    lisinopril (PRINIVIL;ZESTRIL) 5 MG tablet TAKE 1 TABLET BY MOUTH ONCE DAILY 30 tablet 5    bumetanide (BUMEX) 1 MG tablet TAKE 1 TABLET BY MOUTH EVERY DAY 30 tablet 5    omeprazole (PRILOSEC) 40 MG delayed release capsule TAKE 1 CAPSULE BY MOUTH EVERY MORNING BEFORE BREAKFAST 30 capsule 5    umeclidinium-vilanterol (ANORO ELLIPTA) 62.5-25 MCG/INH AEPB inhaler Inhale 1 puff into the lungs daily 1 each 5    Lancets (ONETOUCH DELICA PLUS [id number]) MISC TEST TWICE DAILY 100 each 5    ONETOUCH ULTRA strip USE TO TEST BLOOD SUGAR TWICE A  strip 1    montelukast (SINGULAIR) 10 MG tablet take 1 tablet by mouth at bedtime 90 tablet 3    tiZANidine (ZANAFLEX) 4 MG tablet TAKE 1 TABLET BY MOUTH EVERY 8 HOURS. 270 tablet 3    metFORMIN (GLUCOPHAGE-XR) 500 MG extended release tablet TAKE 1 TABLET BY MOUTH DAILY BEFORE BREAKFAST AND SUPPER 180 tablet 3    vitamin D (ERGOCALCIFEROL) 1.25 MG (00429 UT) CAPS capsule TAKE 1 CAPSULE BY MOUTH EVERY WEEK. 12 capsule 3    metoprolol tartrate (LOPRESSOR) 25 MG tablet TAKE 1/2 TABLET BY MOUTH TWICE DAILY 60 tablet 5    triamcinolone (KENALOG) 0.1 % cream APPLY TO AFFECTED AREA TWICE A DAY 30 g 0    diclofenac sodium (VOLTAREN) 1 % GEL Apply 2 g topically 4 times daily as needed for Pain 1 Tube 5    Elastic Bandages & Supports (KNEE BRACE) MISC Right knee brace. Size XL. Elastic/compression. 1 each 0    Blood Glucose Monitoring Suppl (ONE TOUCH ULTRA MINI) w/Device KIT use as directed 1 kit 0    Elastic Bandages & Supports (CARPAL TUNNEL WRIST STABILIZER) MISC Bilateral carpal tunnel splints for carpal tunnel syndrome bilaterally (Dx: G56.01, G56.02) 2 each 0        Medications patient taking as of now reconciled against medications ordered at time of hospital discharge: yes    Chief Complaint   Patient presents with    Follow-Up from [address] of Breath    Wound Check       HPI   [facility] for COPD exacerbation, pneumonia, possible cellulitis upper back. Records reviewed in chart. Patient was discharged 7/21. Discharged home on doxycycline, cefdinir, prednisone. Received IV antibiotics, diuretics, steroids during stay. PMH COPD, diabetes mellitus, history of DVT on chronic anticoagulation, CKD, ZOILA, hypertension, and congestive heart failure. Echo with EF 70 to 75% during stay. During stay patient also required 4 L at rest and 6 L with exertion with baseline usually 3L. Followed by Dr Camille Schmitz pulmonology. Pt reports feeling much better and completed antibiotics, steroids.  On 4L NC now and has been compliant with using cpap and oxygen at home. Was also diagnosed Possible upper back cellulitis but no response with antibiotics and hospitalist reccommended potential punch biopsy to upper back for further evaluation. Pt reports red area has been there for several months and not itching or bothersome. Needs refill on lisinopril, claritin. Still having some cough, congestion but No worsening symptoms. Reports 20lb weight loss past month too. Glucose <100 at home and asymptomatic but reports 60s in AM. Taking Amaryl, metformin. Inpatient course: Discharge summary reviewed- see chart. Interval history/Current status: stable    Review of Systems   Constitutional: Positive for fatigue. Negative for chills, diaphoresis and fever. HENT: Positive for congestion. Negative for ear pain, facial swelling, postnasal drip, sinus pressure, sore throat and trouble swallowing. Eyes: Negative. Respiratory: Positive for cough and wheezing. Negative for shortness of breath. Cardiovascular: Negative. Gastrointestinal: Negative. Musculoskeletal: Negative. Skin: Negative for color change, pallor, rash and wound. Red area upper back   Allergic/Immunologic: Positive for environmental allergies. Neurological: Negative. Vitals:    07/26/21 1315   Temp: 96.9 °F (36.1 °C)   TempSrc: Temporal   Weight: 287 lb (130.2 kg)     Body mass index is 49.26 kg/m². Wt Readings from Last 3 Encounters:   07/26/21 287 lb (130.2 kg)   05/25/21 296 lb (134.3 kg)   02/25/21 210 lb (95.3 kg)     BP Readings from Last 3 Encounters:   05/25/21 110/60   02/25/21 110/60   11/12/20 130/80       Physical Exam  Vitals and nursing note reviewed. Constitutional:       General: She is not in acute distress. Appearance: Normal appearance. She is obese. HENT:      Head: Normocephalic. Eyes:      Conjunctiva/sclera: Conjunctivae normal.      Pupils: Pupils are equal, round, and reactive to light. Cardiovascular:      Rate and Rhythm: Normal rate and regular rhythm. Pulmonary:      Effort: Pulmonary effort is normal. No respiratory distress. Breath sounds: Wheezing present. No rales. Comments: 3L NC. Faint scattered expiratory wheezing. Abdominal:      Tenderness: There is no guarding. Musculoskeletal:         General: Normal range of motion. Cervical back: Normal range of motion. Right lower leg: No edema. Left lower leg: No edema. Skin:     General: Skin is warm. Capillary Refill: Capillary refill takes less than 2 seconds. Findings: Erythema present. No abscess, lesion, petechiae or wound. Neurological:      General: No focal deficit present. Mental Status: She is alert and oriented to person, place, and time. Motor: No weakness. Coordination: Coordination normal.      Gait: Gait normal.   Psychiatric:         Mood and Affect: Mood normal.         Thought Content: Thought content normal.         Lab Results   Component Value Date     05/25/2021    K 4.7 05/25/2021     05/25/2021    CO2 28 05/25/2021    GLUCOSE 113 05/25/2021    BUN 22 05/25/2021    CREATININE 1.4 05/25/2021    CALCIUM 10.2 05/25/2021    PROT 7.2 03/12/2021    LABALBU 4.1 03/12/2021    BILITOT 0.9 03/12/2021    ALT 29 03/12/2021    AST 25 03/12/2021       Hemoglobin A1C (%)   Date Value   05/25/2021 6.6 (H)     Microscopic Examination (no units)   Date Value   04/13/2015 YES     Microalbumin, Random Urine (mg/dL)   Date Value   03/09/2018 <1.20     LDL Calculated (mg/dL)   Date Value   03/12/2021 42         Lab Results   Component Value Date    WBC 6.5 03/12/2021    NEUTROABS 4.0 03/12/2021    HGB 14.0 03/12/2021    HCT 45.0 03/12/2021    .7 03/12/2021     03/12/2021       Lab Results   Component Value Date    TSH 3.93 04/13/2015       [facility] records:  7/17 CXR:  Bibasilar consolidation, worse on the left. Pneumonia is  favored.  Follow-up two-view chest recommended. 7/19 ECHO:  1.  Normal left ventricular size and hyperdynamic LV systolic function,   LVEF 70-75%. 2.  Indeterminate LV diastolic filling pattern. 3.  Moderate right ventricular dilation with normal RV systolic function   by TAPSE. 4.  Moderate right atrial dilation. 5.  Mild tricuspid regurgitation. 6.  Severe pulmonary hypertension, RVSP 81 mmHg. Assessment/Plan:  1. Hospital discharge follow-up  Improved. Completed oral antibiotics. NAD, oxygen saturations stable on 3 L. Continue home medications. Follow-up with providers as scheduled. We will obtain a repeat chest x-ray and labs. Will follow. Return to clinic if not better. - XR CHEST STANDARD (2 VW); Future  - CBC; Future  - COMPREHENSIVE METABOLIC PANEL; Future    2. Pulmonary hypertension ([address]  See echo report above. Followed by pulmonary. Continue regimen. 3. Panlobular emphysema ([address]  4. History of pneumonia  Chest x-ray above. -Improved. Completed oral antibiotics. NAD, oxygen saturations stable on 3 L. Continue home medications. Follow-up with providers as scheduled. We will obtain a repeat chest x-ray and labs. Will follow. Return to clinic if not better. - XR CHEST STANDARD (2 VW); Future  - CBC; Future  - COMPREHENSIVE METABOLIC PANEL; Future    5. Essential hypertension  Stable. Take medications as prescribed. Continue home medications. Patient verbalized understanding.     - lisinopril (PRINIVIL;ZESTRIL) 5 MG tablet; TAKE 1 TABLET BY MOUTH ONCE DAILY  Dispense: 30 tablet; Refill: 1  - CBC; Future  - COMPREHENSIVE METABOLIC PANEL; Future    6. Dermatitis, unspecified  Will try topical bactroban as directed to area. Refer to derm. F/u prn.     - mupirocin (BACTROBAN) 2 % ointment; Apply thin layer to affected area 2 times daily 10 days  Dispense: 30 g; Refill: 1  - External Referral To Dermatology    7.  Type 2 diabetes mellitus with diabetic neuropathy, without long-term current use

## 2021-07-29 NOTE — OUTREACH NOTE
COPD/PN Week 1 Survey      Responses   [facility] patient discharged from?  [facility]   Does the patient have one of the following disease processes/diagnoses(primary or secondary)?  COPD/Pneumonia   Was the primary reason for admission:  COPD exacerbation   Week 1 attempt successful?  No   Unsuccessful attempts  Attempt 2          Fariha Cobb RN

## 2021-08-03 NOTE — OUTREACH NOTE
COPD/PN Week 2 Survey      Responses   [facility] patient discharged from?  [facility]   Does the patient have one of the following disease processes/diagnoses(primary or secondary)?  COPD/Pneumonia   Was the primary reason for admission:  COPD exacerbation   Week 2 attempt successful?  Yes   Call start time  1317   Call end time  1323   Discharge diagnosis  PNA/COPD   Meds reviewed with patient/caregiver?  Yes   Is the patient having any side effects they believe may be caused by any medication additions or changes?  No   Does the patient have all medications ordered at discharge?  Yes   Is the patient taking all medications as directed (includes completed medication regime)?  Yes   Does the patient have a primary care provider?   Yes   Does the patient have an appointment with their PCP or specialist within 7 days of discharge?  Yes   Has the patient kept scheduled appointments due by today?  N/A   Has home health visited the patient within 72 hours of discharge?  N/A   What DME was ordered?  O2   Has all DME been delivered?  Yes   Pulse Ox monitoring  Intermittent   Pulse Ox device source  Patient   O2 Sat comments  Sometimes down in the 80s, 88-89% on a regular day.   O2 Sat: education provided  Sat levels, Monitoring frequency, When to seek care   O2 Sat education comments  If below 88% and stays there, call 911.   Psychosocial issues?  No   Did the patient receive a copy of their discharge instructions?  Yes   Nursing interventions  Reviewed instructions with patient   What is the patient's perception of their health status since discharge?  Improving   Nursing Interventions  Nurse provided patient education   Are the patient's immunizations up to date?   Yes   Nursing interventions  Educated on importance of maintaining up to date immunizations as advised by provider   If the patient is a current smoker, are they able to teach back resources for cessation?  Not a smoker   Is the patient/caregiver able to  teach back the hierarchy of who to call/visit for symptoms/problems? PCP, Specialist, Home health nurse, Urgent Care, ED, 911  Yes   Additional teach back comments  Afebrile, says she is doing better. discussed when to call 911.   Is the patient/caregiver able to teach back signs and symptoms of worsening condition:  Fever/chills, Shortness of breath, Chest pain   Is the patient/caregiver able to teach back importance of completing antibiotic course of treatment?  Yes   Week 2 call completed?  Yes   Wrap up additional comments  Improving daily. Appt with PCP end of August.          Fariha Cobb RN

## 2021-08-12 NOTE — OUTREACH NOTE
COPD/PN Week 3 Survey      Responses   [facility] patient discharged from?  [facility]   Does the patient have one of the following disease processes/diagnoses(primary or secondary)?  COPD/Pneumonia   Was the primary reason for admission:  COPD exacerbation   Week 3 attempt successful?  Yes   Call start time  0854   Call end time  0857   Discharge diagnosis  PNA/COPD   Meds reviewed with patient/caregiver?  Yes   Is the patient taking all medications as directed (includes completed medication regime)?  Yes   Medication comments  Completed antibiotics and steriods   Has the patient kept scheduled appointments due by today?  Yes   Pulse Ox monitoring  Intermittent   Did the patient receive a copy of their discharge instructions?  Yes   What is the patient's perception of their health status since discharge?  Improving   Are the patient's immunizations up to date?   -- [Has had COVID immunization]   Additional teach back comments  Back to doing her normal activities   Week 3 call completed?  Yes          Bobbi Munoz RN

## 2021-08-25 NOTE — PROGRESS NOTES
Health Maintenance Due This Visit   Colonoscopy No   Mammogram No   Annual Wellness Visit No   Microalbumin No   HgbA1C No   Diabetic Eye Exam Yes- Pt will schedule     House Bill One Due This Visit   NITHYA No   UDS No   Contract No    Chief Complaint   Patient presents with    Hypertension    Diabetes     Pt here today for f/u on htn, COPD, and DM. c/o right leg edema, knot on the back of her neck     Have you seen any other physician or provider since your last visit Yes- [facility] dx pneumonia    Have you had any other diagnostic tests since your last visit? Yes- labs    Have you changed or stopped any medications since your last visit? no       SUBJECTIVE:    Patient ID: India Tovar is a 60 y.o. female. Medical History Review  Past Medical, Family, and Social History reviewed. Health Maintenance Due   Topic Date Due    Hepatitis C screen  Never done    HIV screen  Never done    Hepatitis B vaccine (1 of 3 - Risk 3-dose series) Never done    Cervical cancer screen  Never done    Shingles Vaccine (1 of 2) Never done    Diabetic retinal exam  05/10/2017    Diabetic foot exam  03/09/2018    Diabetic microalbuminuria test  03/20/2021    Flu vaccine (1) 09/01/2021       HPI:   Chief Complaint   Patient presents with    Hypertension    Diabetes   She has not been feeling well. She gets fatigued easily. She stays drowsy. Her right leg is swollen and slightly erythematous. Patient's medications, allergies, past medical, surgical, social and family histories were reviewed and updated as appropriate. Review of Systems   Constitutional: Positive for fatigue. Negative for chills and fever. HENT: Negative for ear pain and sore throat. Eyes: Negative for pain and visual disturbance. Respiratory: Positive for shortness of breath. Negative for cough. Cardiovascular: Positive for leg swelling. Negative for chest pain and palpitations.    Gastrointestinal: Negative for abdominal pain, nausea and vomiting. Genitourinary: Negative for dysuria and hematuria. Musculoskeletal: Negative for joint swelling. Skin: Negative for rash. Neurological: Negative for dizziness and weakness. Psychiatric/Behavioral: Negative for sleep disturbance. Reviewed and acurate. See MA note. OBJECTIVE:  /70 (Site: Right Upper Arm, Position: Sitting)   Pulse 94   Temp 96.5 °F (35.8 °C) (Temporal)   Ht 5' 4\" (1.626 m)   Wt (!) 302 lb (137 kg)   SpO2 90% Comment: 4L  BMI 51.84 kg/m²    Physical Exam  Vitals reviewed. Constitutional:       General: She is not in acute distress. Appearance: She is well-developed. HENT:      Head: Normocephalic. Mouth/Throat:      Pharynx: No oropharyngeal exudate. Eyes:      General: Lids are normal.   Cardiovascular:      Rate and Rhythm: Normal rate and regular rhythm. Heart sounds: Normal heart sounds. Pulmonary:      Effort: Pulmonary effort is normal.      Breath sounds: Normal breath sounds. Abdominal:      General: Bowel sounds are normal. There is no distension. Palpations: Abdomen is soft. Tenderness: There is no abdominal tenderness. Musculoskeletal:      Cervical back: Neck supple. Right lower leg: Edema present. Comments: Slight erythema of the RLE, negative Hugo's sign   Lymphadenopathy:      Cervical: No cervical adenopathy. Skin:     General: Skin is warm and dry. Neurological:      Mental Status: She is alert and oriented to person, place, and time.          Results in Past 30 Days  Result Component Current Result Ref Range Previous Result Ref Range   Albumin/Globulin Ratio 1.0 (9/25/2021) 0.8 - 2.0 1.0 (9/24/2021) 0.8 - 2.0   Albumin 3.3 (L) (9/25/2021) 3.4 - 4.8 g/dL 3.6 (9/24/2021) 3.4 - 4.8 g/dL   Alkaline Phosphatase 108 (H) (9/25/2021) 25 - 100 U/L 113 (H) (9/24/2021) 25 - 100 U/L   ALT 11 (9/25/2021) 4 - 36 U/L 13 (9/24/2021) 4 - 36 U/L   AST 14 (9/25/2021) 8 - 33 U/L 22 (9/24/2021) 8 - 33 U/L   BUN 63 (H) JACQUELYN Resendiz   tiZANidine (ZANAFLEX) 4 MG tablet TAKE 1 TABLET BY MOUTH EVERY 8 HOURS. Yes JACQUELYN Mesa   metFORMIN (GLUCOPHAGE-XR) 500 MG extended release tablet TAKE 1 TABLET BY MOUTH DAILY BEFORE BREAKFAST AND SUPPER Yes JACQUELYN Mesa   metoprolol tartrate (LOPRESSOR) 25 MG tablet TAKE 1/2 TABLET BY MOUTH TWICE DAILY Yes JACQUELYN Mesa   ELIQUIS 2.5 MG TABS tablet TAKE 1 TABLET BY MOUTH EVERY 12 HOURS Yes JACQUELYN Mesa   omeprazole (PRILOSEC) 40 MG delayed release capsule TAKE 1 CAPSULE BY MOUTH EVERY MORNING BEFORE BREAKFAST Yes JACQUELYN Mesa   pregabalin (LYRICA) 150 MG capsule Take 1 capsule by mouth 2 times daily for 60 days. TAKE 1 CAPSULE BY MOUTH TWICE DAILY Yes JACQUELYN Mesa   Albuterol Sulfate, sensor, (PROAIR DIGIHALER) 108 (90 Base) MCG/ACT AEPB Inhale 1 puff into the lungs every 4 hours as needed (SOB) Yes JACQUELYN Mesa   ipratropium-albuterol (DUONEB) 0.5-2.5 (3) MG/3ML SOLN nebulizer solution Inhale 3 mLs into the lungs every 4 hours as needed for Shortness of Breath Yes JACQUELYN Mesa   umeclidinium-vilanterol (ANORO ELLIPTA) 62.5-25 MCG/INH AEPB inhaler Inhale 1 puff into the lungs daily Yes JACQUELYN Mesa   Lancets (ONETOUCH DELICA PLUS [id number]) [address] [facility] TEST TWICE DAILY Yes JACQUELYN Mesa   ONETOUCH ULTRA strip USE TO TEST BLOOD SUGAR TWICE A DAY Yes JACQUELYN Mesa   montelukast (SINGULAIR) 10 MG tablet take 1 tablet by mouth at bedtime Yes JACQUELYN Mesa   triamcinolone (KENALOG) 0.1 % cream APPLY TO AFFECTED AREA TWICE A DAY Yes JACQUELYN Mesa   diclofenac sodium (VOLTAREN) 1 % GEL Apply 2 g topically 4 times daily as needed for Pain Yes JACQUELYN Mesa   Elastic Bandages & Supports (KNEE BRACE) MISC Right knee brace. Size XL. Elastic/compression.  Yes Brady Harrell, APRN - CNP   Blood Glucose Monitoring Suppl (ONE TOUCH ULTRA MINI) w/Device KIT use as directed Yes Yolande Mendoza,    Elastic Bandages & Supports (CARPAL TUNNEL WRIST STABILIZER) MISC Bilateral carpal tunnel splints for carpal tunnel syndrome bilaterally (Dx: G56.01, G56.02) Yes Leta Perry DO   vitamin D (ERGOCALCIFEROL) 1.25 MG (05296 UT) CAPS capsule TAKE 1 CAPSULE BY MOUTH EVERY WEEK. Verlinda Areas, APRN   fluticasone (FLONASE) 50 MCG/ACT nasal spray SHAKE LIQUID AND USE 2 SPRAYS IN EACH NOSTRIL DAILY  Verlinda Areas, APRN   bumetanide (BUMEX) 1 MG tablet Take twice daily as needed. Patient taking differently: 1 mg 2 times daily   Verlinda Areas, APRN   HYDROcodone-acetaminophen (NORCO) 7.5-325 MG per tablet Take 1 tablet by mouth every 8 hours as needed for Pain for up to 30 days. Verlinda Areas, APRN   methylPREDNISolone (MEDROL DOSEPACK) 4 MG tablet Take by mouth. Patient not taking: Reported on 7/26/2021  Caitlyn Larsen APRN   atorvastatin (LIPITOR) 40 MG tablet TAKE 1 TABLET BY MOUTH ONCE DAILY  Patient not taking: Reported on 5/25/2021  Caitlyn Larsen APRN       ASSESSMENT:  1. Right leg swelling    2. Essential hypertension    3. Chronic neck and back pain    4. Chronic back pain, unspecified back location, unspecified back pain laterality    5. Arthritis        PLAN:  Orders Placed This Encounter   Medications    glimepiride (AMARYL) 4 MG tablet     Sig: TAKE 1 TABLET BY MOUTH EVERY MORNING BEFORE BREAKFAST     Dispense:  90 tablet     Refill:  3    loratadine (CLARITIN) 10 MG tablet     Sig: TAKE 1 TABLET BY MOUTH EVERY DAY     Dispense:  90 tablet     Refill:  3    lisinopril (PRINIVIL;ZESTRIL) 5 MG tablet     Sig: TAKE 1 TABLET BY MOUTH ONCE DAILY     Dispense:  90 tablet     Refill:  3    docusate sodium (DOK) 100 MG capsule     Sig: TAKE ONE CAPSULE BY MOUTH TWICE DAILY     Dispense:  180 capsule     Refill:  3    tiZANidine (ZANAFLEX) 4 MG tablet     Sig: TAKE 1 TABLET BY MOUTH EVERY 8 HOURS.      Dispense:  270 tablet     Refill:  3    metFORMIN (GLUCOPHAGE-XR) 500 MG extended release tablet     Sig: TAKE 1 TABLET BY MOUTH DAILY BEFORE BREAKFAST AND SUPPER     Dispense:  180 tablet     Refill:  3    metoprolol tartrate (LOPRESSOR) 25 MG tablet     Sig: TAKE 1/2 TABLET BY MOUTH TWICE DAILY     Dispense:  90 tablet     Refill:  3    DISCONTD: HYDROcodone-acetaminophen (NORCO) 7.5-325 MG per tablet     Sig: Take 1 tablet by mouth every 8 hours as needed for Pain for up to 30 days. Dispense:  90 tablet     Refill:  0     Reduce doses taken as pain becomes manageable    DISCONTD: bumetanide (BUMEX) 1 MG tablet     Sig: TAKE 1 TABLET BY MOUTH EVERY DAY     Dispense:  90 tablet     Refill:  3     Orders Placed This Encounter   Procedures    US DUP LOWER EXTREMITY RIGHT DAVID       Return in about 3 months (around 11/25/2021).

## 2021-08-31 NOTE — TELEPHONE ENCOUNTER
Patient states that they can't fill her medication at the pharmacy because they don't have any in stock. She ask if we can send the prescription to [facility].

## 2021-09-04 NOTE — TELEPHONE ENCOUNTER
Reason for Disposition   [1] MODERATE dizziness (e.g., vertigo; feels very unsteady, interferes with normal activities) AND [2] has NOT been evaluated by physician for this    Answer Assessment - Initial Assessment Questions  1. DESCRIPTION: \"Describe your dizziness. \"    Needs to hold onto something when standing up. 2. VERTIGO: \"Do you feel like either you or the room is spinning or tilting? \"       No vertigo    3. LIGHTHEADED: \"Do you feel lightheaded? \" (e.g., somewhat faint, woozy, weak upon standing)      Lightheaded, feel like \"going to pass out when standing up\"    4. SEVERITY: \"How bad is it? \"  \"Can you walk? \"    - MILD - Feels unsteady but walking normally. - MODERATE - Feels very unsteady when walking, but not falling; interferes with normal activities (e.g., school, work) . - SEVERE - Unable to walk without falling (requires assistance). When changing position- Moderate    5. ONSET:  \"When did the dizziness begin? \"      Been having symptoms for three or four days    6. AGGRAVATING FACTORS: \"Does anything make it worse? \" (e.g., standing, change in head position)      Changing position (head position)    7. CAUSE: \"What do you think is causing the dizziness? \"     Unsure    8. RECURRENT SYMPTOM: \"Have you had dizziness before? \" If so, ask: \"When was the last time? \" \"What happened that time? \"      Throat was \"hurting very bad yesterday\", \"ear tips were hurting all the way down neck\"    9. OTHER SYMPTOMS: \"Do you have any other symptoms? \" (e.g., headache, weakness, numbness, vomiting, earache)     Weak for the COPD, history of diabetes    10. PREGNANCY: \"Is there any chance you are pregnant? \" \"When was your last menstrual period? \"       N/A    Protocols used: DIZZINESS - VERTIGO-ADULT-    Received call from Aline Hernandez at [facility] with [employer] [facility]. Brief description of triage: See above. Triage indicates for patient to be seen in the next 24 hours.       Care advice provided, patient verbalizes understanding; denies any other questions or concerns; instructed to call back for any new or worsening symptoms. Attention Provider: Thank you for allowing me to participate in the care of your patient. The patient was connected to triage in response to information provided to the [facility]. Please do not respond through this encounter as the response is not directed to a shared pool.

## 2021-09-07 NOTE — TELEPHONE ENCOUNTER
Reason for Disposition   SEVERE swelling (e.g., swelling extends above knee, entire leg is swollen, weeping fluid)    Answer Assessment - Initial Assessment Questions  1. ONSET: \"When did the swelling start? \" (e.g., minutes, hours, days)      Leg has been swelling since July. Saw Ilda 8/25 and had US, was negative for blood clots    2. LOCATION: \"What part of the leg is swollen? \"  \"Are both legs swollen or just one leg? \"      Right leg, swelling extends above knee    3. SEVERITY: \"How bad is the swelling? \" (e.g., localized; mild, moderate, severe)   - Localized - small area of swelling localized to one leg   - MILD pedal edema - swelling limited to foot and ankle, pitting edema < 1/4 inch (6 mm) deep, rest and elevation eliminate most or all swelling   - MODERATE edema - swelling of lower leg to knee, pitting edema > 1/4 inch (6 mm) deep, rest and elevation only partially reduce swelling   - SEVERE edema - swelling extends above knee, facial or hand swelling present       Swelling extends above knee    4. REDNESS: \"Does the swelling look red or infected? \"      Leg is red and hot to the touch    5. PAIN: \"Is the swelling painful to touch? \" If so, ask: \"How painful is it? \"   (Scale 1-10; mild, moderate or severe)      Denies    6. FEVER: \"Do you have a fever? \" If so, ask: \"What is it, how was it measured, and when did it start? \"       Denies    7. CAUSE: \"What do you think is causing the leg swelling? \"      Unsure    8. MEDICAL HISTORY: \"Do you have a history of heart failure, kidney disease, liver failure, or cancer? \"      CHF    9. RECURRENT SYMPTOM: \"Have you had leg swelling before? \" If so, ask: \"When was the last time? \" \"What happened that time? \"      Has been on diuretics for about 6 years    10. OTHER SYMPTOMS: \"Do you have any other symptoms? \" (e.g., chest pain, difficulty breathing)        20 lbs weight gain since 8/25/21. 11. PREGNANCY: \"Is there any chance you are pregnant? \" \"When was your last menstrual period? \"        N/a    Protocols used: LEG SWELLING AND EDEMA-ADULT-[place]    Triager called patient back per info given by [facility]. Brief description of triage: Patient experiencing right leg swelling. States swelling is severe, extends above knee. Was seen for it 8/25/21 and has had an ultrasound to rule out DVT. Since appointment on 8/25, swelling has gotten progressively worse, patient has had a 20 lbs weight gain and feels like there is swelling in abdomen now as well    Triage indicates for patient to 2nd level triage. No providers available at office and are not available to receive a message and give recommendation. Triaged up per protocol, advised patient to go to ED    Care advice provided, patient verbalizes understanding; denies any other questions or concerns; instructed to call back for any new or worsening symptoms. Attention Provider: Thank you for allowing me to participate in the care of your patient. The patient was connected to triage in response to information provided to the [facility]. Please do not respond through this encounter as the response is not directed to a shared pool.

## 2021-09-08 NOTE — ED PROVIDER NOTES
"Subjective   56-year-old female presents with right leg swelling.  She reports that her right leg has been swelling for multiple days, at least over a week, she just recently had a Doppler of her lower extremity that was negative for blood clots.  She reports that she has multiple medical problems.  She has obesity, COPD 4 L oxygen dependent, diabetes, hypertension, sleep apnea, and renal insufficiency.  In the past she has had swelling and been on Lasix and she states that helped a lot she was then switched to Bumex and it also helps get fluid off of her but it now is not working as well.  She admits that her shortness of breath is also gotten worse especially with any exertion, she has to stop multiple times to walk from one room of the house to the other.  The swelling in her leg is on the right side and extends up the entirety of the leg and she does have some pain in the leg as well as in her back.  She also reports that the leg swelling on her right side extends to her lower abdominal area, she feels like the right side of her abdomen is more swollen than her left      History provided by:  Patient   used: No        Review of Systems   Respiratory: Positive for shortness of breath.    Cardiovascular: Positive for leg swelling.   Gastrointestinal:        Abdominal swelling   Musculoskeletal: Positive for back pain.   All other systems reviewed and are negative.      Past Medical History:   Diagnosis Date   • Congestive heart failure (CMS/HCC)    • COPD (chronic obstructive pulmonary disease) (CMS/HCC)    • Diabetes mellitus (CMS/HCC)    • H/O blood clots     on chronic anticoagulation therapy.    • Heart murmur    • History of EKG 01/29/2015    :  EKG:  Sinus rhythm.    • Hypertension    • Kidney failure    • Migraine    • Sleep apnea, obstructive        Allergies   Allergen Reactions   • Erythromycin Rash and Other (See Comments)     \"needles sticking in my chest\"       Past Surgical " History:   Procedure Laterality Date   • COLON RESECTION WITH COLOSTOMY     • CYSTOSCOPY URETHRAL DIVERTICULUM REPAIR/EXCISION     • FOOT SURGERY     • REVISION / TAKEDOWN COLOSTOMY         Family History   Problem Relation Age of Onset   • Cancer Mother    • Heart attack Father    • Diabetes Other    • Heart disease Other        Social History     Socioeconomic History   • Marital status: Legally      Spouse name: Not on file   • Number of children: Not on file   • Years of education: Not on file   • Highest education level: Not on file   Tobacco Use   • Smoking status: Current Every Day Smoker     Packs/day: 1.00     Years: 25.00     Pack years: 25.00     Types: Cigarettes   • Smokeless tobacco: Never Used   • Tobacco comment: uses e-cig, quit cigarettes in 2004   Vaping Use   • Vaping Use: Every day   Substance and Sexual Activity   • Alcohol use: No   • Drug use: No   • Sexual activity: Defer           Objective   Physical Exam  Vitals and nursing note reviewed.   Constitutional:       Appearance: She is well-developed.   HENT:      Head: Normocephalic.   Cardiovascular:      Rate and Rhythm: Normal rate and regular rhythm.   Pulmonary:      Effort: Pulmonary effort is normal.      Breath sounds: Normal breath sounds.   Abdominal:      General: Bowel sounds are normal.      Palpations: Abdomen is soft.   Musculoskeletal:         General: Swelling present.      Cervical back: Normal range of motion and neck supple.      Right lower leg: Edema present.      Comments: Noticeable right lower extremity edema compared to left with pitting 1-2+   Skin:     General: Skin is warm and dry.   Neurological:      Mental Status: She is alert and oriented to person, place, and time.      Deep Tendon Reflexes: Reflexes are normal and symmetric.   Psychiatric:         Mood and Affect: Mood normal.         Procedures           ED Course  ED Course as of Sep 08 1956   Wed Sep 08, 2021   1815 EKG interpreted by me reveals  sinus rhythm with rate of 61 bpm.  There is low QRS voltage in limb in chest leads.  Nonspecific T wave changes.  This is an abnormal appearing EKG.    [TB]   1955 Discussed increasing the Bumex, and follow-up with primary care physician    [CS]      ED Course User Index  [CS] Guido Friedman Jr., PA-C  [TB] Cheryl Caldera MD                                           MDM  Number of Diagnoses or Management Options  Acute on chronic congestive heart failure, unspecified heart failure type (CMS/HCC): new and requires workup  Chronic obstructive pulmonary disease, unspecified COPD type (CMS/HCC): new and requires workup  Leg edema: new and requires workup  Volume overload state of heart: new and requires workup     Amount and/or Complexity of Data Reviewed  Clinical lab tests: reviewed  Tests in the radiology section of CPT®: reviewed  Decide to obtain previous medical records or to obtain history from someone other than the patient: yes    Risk of Complications, Morbidity, and/or Mortality  Presenting problems: low  Diagnostic procedures: minimal  Management options: minimal        Final diagnoses:   Acute on chronic congestive heart failure, unspecified heart failure type (CMS/HCC)   Chronic obstructive pulmonary disease, unspecified COPD type (CMS/HCC)   Volume overload state of heart   Leg edema       ED Disposition  ED Disposition     ED Disposition Condition Comment    Discharge Stable           Soumya Muñoz APRN  [address] [facility] [address] [phone]    Schedule an appointment as soon as possible for a visit       [facility] Emergency Department  [address] [facility] [address] [phone]    If symptoms worsen         Medication List      Changed    Anoro Ellipta 62.5-25 MCG/INH aerosol powder  inhaler  Generic drug: umeclidinium-vilanterol  Inhale 1 puff Daily.  What changed: when to take this             Guido Friedman Jr., PA-C  09/08/21 1956

## 2021-09-08 NOTE — ED NOTES
Pt states that she has gained 20 pounds in 12-13 days. Pt states this is the fluid cumulating      Purvi Esparza, Nursing Student  09/08/21 [id number]

## 2021-09-09 NOTE — TELEPHONE ENCOUNTER
Pt called stating that she was seen in the ER for CHF and states they told her to increase Bumex to BID, asking for new rx

## 2021-09-13 NOTE — PROGRESS NOTES
"Chief Complaint   Patient presents with   • Follow-up   • Shortness of Breath         Subjective   Chante Wetzel is a 56 y.o. female.     History of Present Illness   The patient comes in today for edema and increased shortness of breath.    She reports increased swelling in her lower extremities.  She states she is unable to walk much due to the swelling.  She notes she has gained 30 pounds in the last 18 days.    She went to the emergency room last week and was told to increase Bumex to twice a day and she was given IV Lasix.  She has now taking Bumex 1 mg twice a day.  She states she is urinating a lot but the swelling has not decreased.    She states she is trying to drink more water and reports she is drinking about 4-16 ounce bottles of water a day plus a 0 sugar soda.    She was in the hospital in July and had an echocardiogram done then.    She is on CPAP and states she has been trying to use it but she is not sleeping much due to the pain in her legs.    She is using Anoro daily and the rescue inhaler twice a day.      The following portions of the patient's history were reviewed and updated as appropriate: allergies, current medications, past family history, past medical history, past social history and past surgical history.    Review of Systems   Constitutional: Negative for chills and fever.   HENT: Negative for rhinorrhea, sinus pressure, sneezing and sore throat.    Respiratory: Positive for shortness of breath and wheezing. Negative for cough.    Cardiovascular: Positive for leg swelling.   Psychiatric/Behavioral: Positive for sleep disturbance.       Objective   Visit Vitals  /74   Pulse 91   Ht 162.6 cm (64\")   Wt (!) 150 kg (330 lb)   SpO2 (!) 76% Comment: Room air at rest   BMI 56.64 kg/m²   SpO2: 95% on 4 LPM        Physical Exam  Vitals reviewed.   HENT:      Head: Atraumatic.      Mouth/Throat:      Mouth: Mucous membranes are moist.      Comments: Crowded oropharynx.   Eyes:      " Extraocular Movements: Extraocular movements intact.   Cardiovascular:      Rate and Rhythm: Normal rate and regular rhythm.   Pulmonary:      Effort: Pulmonary effort is normal. No respiratory distress.      Comments: Decreased A/E especially in the bases.  Abdominal:      Comments: Obese abdomen.   Musculoskeletal:      Cervical back: Neck supple.      Comments: In wheelchair. 2+ BLE edema.   Skin:     General: Skin is warm.   Neurological:      Mental Status: She is alert and oriented to person, place, and time.             Assessment/Plan   Diagnoses and all orders for this visit:    1. Shortness of breath (Primary)    2. Chronic obstructive pulmonary disease, unspecified COPD type (CMS/HCC)    3. Hypoxia    4. Obstructive sleep apnea    5. Morbid obesity, unspecified obesity type (CMS/HCC)    6. Pulmonary hypertension (CMS/HCC)    7. Acute on chronic diastolic CHF (congestive heart failure) (CMS/[facility])  -     Basic Metabolic Panel; Future           Return in about 12 weeks (around 12/6/2021) for Recheck, For Dr. Mccormack.    DISCUSSION (if any):  I reviewed her ER visit and labs from last week.  Unfortunately her creatinine is already elevated so increasing or changing diuretics is not an option.        Results for CRISTOPHER BUTLER (MRN [id number]) as of 9/13/2021 12:13   Ref. Range 9/8/2021 18:15   Troponin T Latest Ref Range: 0.000 - 0.030 ng/mL <0.010   proBNP Latest Ref Range: 0.0 - 900.0 pg/mL 11,024.0 (H)   Glucose Latest Ref Range: 65 - 99 mg/dL 67   Sodium Latest Ref Range: 136 - 145 mmol/L 135 (L)   Potassium Latest Ref Range: 3.5 - 5.2 mmol/L 5.4 (H)   CO2 Latest Ref Range: 22.0 - 29.0 mmol/L 29.7 (H)   Chloride Latest Ref Range: 98 - 107 mmol/L 96 (L)   Anion Gap Latest Ref Range: 5.0 - 15.0 mmol/L 9.3   Creatinine Latest Ref Range: 0.57 - 1.00 mg/dL 2.11 (H)   BUN Latest Ref Range: 6 - 20 mg/dL 36 (H)   BUN/Creatinine Ratio Latest Ref Range: 7.0 - 25.0  17.1   Calcium Latest Ref Range: 8.6 - 10.5 mg/dL  9.5   eGFR Non African Am Latest Ref Range: >60 mL/min/1.73 24 (L)   Alkaline Phosphatase Latest Ref Range: 39 - 117 U/L 101   Total Protein Latest Ref Range: 6.0 - 8.5 g/dL 6.8   ALT (SGPT) Latest Ref Range: 1 - 33 U/L 15   AST (SGOT) Latest Ref Range: 1 - 32 U/L 15   Total Bilirubin Latest Ref Range: 0.0 - 1.2 mg/dL 0.9   Albumin Latest Ref Range: 3.50 - 5.20 g/dL 3.70   Globulin Latest Units: gm/dL 3.1   A/G Ratio Latest Units: g/dL 1.2   Magnesium Latest Ref Range: 1.6 - 2.6 mg/dL 2.3   WBC Latest Ref Range: 3.40 - 10.80 10*3/mm3 7.94   RBC Latest Ref Range: 3.77 - 5.28 10*6/mm3 4.12   Hemoglobin Latest Ref Range: 12.0 - 15.9 g/dL 12.9   Hematocrit Latest Ref Range: 34.0 - 46.6 % 41.4   RDW Latest Ref Range: 12.3 - 15.4 % 18.1 (H)   MCV Latest Ref Range: 79.0 - 97.0 fL 100.5 (H)   MCH Latest Ref Range: 26.6 - 33.0 pg 31.3   MCHC Latest Ref Range: 31.5 - 35.7 g/dL 31.2 (L)   MPV Latest Ref Range: 6.0 - 12.0 fL 11.4   Platelets Latest Ref Range: 140 - 450 10*3/mm3 185   RDW-SD Latest Ref Range: 37.0 - 54.0 fl 66.6 (H)       I reviewed the Echo results which are below. This echo was from July 2021.  She will possibly need a right heart cath for definitive diagnosis of pulmonary hypertension however this is not an option at this time as her kidney function is elevated and she would not be able to tolerate her filter the dye.  She may need a repeat echocardiogram when she is out of this acute phase.    Interpretation Summary    1.  Normal left ventricular size and hyperdynamic LV systolic function, LVEF 70-75%.  2.  Indeterminate LV diastolic filling pattern.  3.  Moderate right ventricular dilation with normal RV systolic function by TAPSE.  4.  Moderate right atrial dilation.  5.  Mild tricuspid regurgitation.  6.  Severe pulmonary hypertension, RVSP 81 mmHg.       She is compliant with CPAP use at a pressure of 13.  She is compliant at greater than 96%.  I have asked her to continue using CPAP therapy as much as  possible.    She would benefit from hospitalization however due to increased Covid cases we do not have any available beds at this time.  She is not in any respiratory distress today.  I have asked her to repeat a BMP on Wednesday and if her labs are worse we will plan to admit her.    I have asked her to follow a 3812-3456 mL fluid restriction.  We have discussed how many bottles of water this would include and including the one soda she drinks daily.    She will need to continue oxygen at 4 L/min due to hypoxemia at rest.    She should continue Anoro and the rescue medication as directed.    When she comes to have her labs drawn on Wednesday I have asked her to call me from the parking lot and let me know she has had the labs drawn.  We are doing this because she lives out of town and if she needs to be admitted to the hospital I would rather her not drive home and have to drive back for admission.      Dictated utilizing Dragon dictation.    This document was electronically signed by JOHNNIE Corado September 16, 2021  12:58 EDT

## 2021-09-13 NOTE — TELEPHONE ENCOUNTER
Pt called stating that she was seen by lung specialist today and that they wanted to \"kathy her but didn't have any beds for CHF and renal failure\" Reports felling awful and c/o sore throat, and asking if you can send her something for that because she doesn't feel like coming in

## 2021-09-14 NOTE — TELEPHONE ENCOUNTER
They do not feel comfortable doing a direct admission.  They want her to go through the ER to be evaluated first.

## 2021-09-14 NOTE — TELEPHONE ENCOUNTER
Pt informed and verbally understood. States she will wait and f/u with her lung specialist tomorrow because they may admit her.  Pt wants to know if something can be called in for sore throat x 4 days, denies any other symptoms

## 2021-09-16 NOTE — PROGRESS NOTES
I called the patient today and gave her her lab results.  The creatinine has improved.  She tells me she has lost 6 pounds (as of yesterday) since Monday and the swelling has improved some in her thighs.  She has not weighed herself yet today as she usually does this after taking the second Bumex. Since she has had some improvement, I do not feel she needs to be admitted to the hospital now.  I have asked her to continue Bumex 1 mg twice a day for the next week as well as continue the fluid restriction of 1200 to 1500 mL daily.    I have asked her to to make an appointment either with her PCP or cardiologist next week for further guidance on continuing Bumex twice a day and also because they would likely want to check a BMP.    If she is not able to see her cardiologist who is Dr. Baires next week I have asked her to call the office and schedule an appointment with him as soon as possible.    I will have our office call her and schedule her an appointment to come back and see us in 12 weeks.    She verbalizes understanding.

## 2021-09-17 NOTE — TELEPHONE ENCOUNTER
----- Message from [facility] sent at 9/17/2021  2:03 PM EDT -----  Subject: Referral Request    QUESTIONS   Reason for referral request? Pt needs labs to check kidney function   Has the physician seen you for this condition before? Yes  Select a date? 2021-08-25  Select the Provider the patient wants to be referred to, if known (PCP or   Specialist)? Christine Raygoza   Preferred Specialist (if applicable)? Do you already have an appointment scheduled? Yes  Select Scheduled Date? 2021-09-24  Select Scheduled Physician? Christine Raygoza   Additional Information for Provider?   ---------------------------------------------------------------------------  --------------  Leesa KRISHNAN  What is the best way for the office to contact you? OK to leave message on   voicemail  Preferred Call Back Phone Number?  [phone]

## 2021-09-24 NOTE — PATIENT INSTRUCTIONS
The medication list included in this document is our record of what you are currently taking, including any changes that were made at today's visit.  If you find any differences when compared to your medications at home, or have any questions that were not answered at your visit, please contact the office. · Keep a list of your medicines with you. List all of the prescription medicines, nonprescription medicines, supplements, natural remedies, and vitamins that you take. Tell your healthcare providers who treat you about all of the products you are taking. Your provider can provide you with a form to keep track of them. Just ask. · Follow the directions that come with your medicine, including information about food or alcohol. Make sure you know how and when to take your medicine. Do not take more or less than you are supposed to take. · Keep all medicines out of the reach of children. · Store medicines according to the directions on the label. · Monitor yourself. Learn to know how your body reacts to your new medicine and keep track of how it makes you feel before attempting (If your provider has allowed you to do so) to drive or go to work. · Seek emergency medical attention if you think you have used too much of this medicine. An overdose of any prescription medicine can be fatal. Overdose symptoms may include extreme drowsiness, muscle weakness, confusion, cold and clammy skin, pinpoint pupils, shallow breathing, slow heart rate, fainting, or coma. · Don't share prescription medicines with others, even when they seem to have the same symptoms. What may be good for you may be harmful to others. · If you are no longer taking a prescribed medication and you have pills left please take your pills out of their original containers. Mix crushed pills with an undesirable substance, such as cat litter or used coffee grounds.  Put the mixture into a disposable container with a lid, such as an empty margarine tub, or into a sealable bag. Cover up or remove any of your personal information on the empty containers by covering it with black permanent marker or duct tape. Place the sealed container with the mixture, and the empty drug containers, in the trash. · If you use a medication that is in the form of a patch, dispose of used patches by folding them in half so that the sticky sides meet, and then flushing them down a toilet. They should not be placed in the household trash where children or pets can find them. · If you have any questions, ask your provider or pharmacist for more information. · Be sure to keep all appointments for provider visits or tests. We are committed to providing you with the best care possible. In order to help us achieve these goals please remember to bring all medications, herbal products, and over the counter supplements with you to each visit. If your provider has ordered testing for you, please be sure to follow up with our office if you have not received results within 7 days after the testing took place. *If you receive a survey after visiting one of our offices, please take time to share your experience concerning your physician office visit. These surveys are confidential and no health information about you is shared. We are eager to improve for you and we are counting on your feedback to help make that happen. Transportation and [address]  [address]. [phone] Jennifer Wilkinson [phone]  Help with food, clothing, transportation, utilities, prescription assistance. [facility] [phone]  Senior Citizens Programs  [address] [facility]  Birdia Rodrigues, [address] [facility]    [address] [facility] Aging and Independent Living Program.  They handle meals on wheels and senior centers.    [employer] [phone] [facility] senior citizens center     [facility]   [phone] or [address] [facility] [address]             [phone]    Esequiel Moreno (Alecia Boss)            [phone]    [address] [facility] [phone]    [facility] senior citizens center             [address]/ [address]              [phone]   [employer] [employer]     [phone]   [employer] and [address] (may visit once monthly      8-4:30)              [phone]    [facility] S[address], food and utility assistance ([place])    Energy Assistance  [place]     [phone]     [address]   [phone]    [address]       [address] with applying for insurance coverage with Medicaid and Medicare including part D  Help with medication cost, eyeglasses or exams, hearing aides  [place]    [address] [phone] [facility]     [phone] Ginna KAUFMAN   (Coordinating and Assisting the Reuse of Assistive Technology)  Help with durable medical equipment and assistive technology   [place] [phone]  [place]     [phone]    Domestic Violence Shelters   [place] Domestic Violence Hotline [phone]  [facility] in [place] but services [place] and [place] [phone]  Natalie Brood in [facility] but services [employer] [phone]    [facility]   Emergency Shelter and [employer] [phone]  [facility] Gayathri Bernabe, [address] [phone] [facility] [employer]  [address] [place] [address] [address] for [facility] [address]     Available 24 hours daily in [place] and [place]

## 2021-09-24 NOTE — ED NOTES
Patient with c/o swelling and has gained 30 lbs since last week. Patient was at her lung doctors last week and couldn't get her a bed at [facility], patients meds Bumex was increased from 1 mg to 2 mg, she states that it helped a couple of days then not after that.      Angela Heart RN  09/24/21 1826

## 2021-09-24 NOTE — ED PROVIDER NOTES
[address] [place] [address] Court  eMERGENCY dEPARTMENT eNCOUnter      Pt Name: Ewa Copeland  MRN: [id number]  [place]: 1965  Date of evaluation: 4/48/7717  Provider: Jude Mata MD    [address] [place] [address]       Chief Complaint   Patient presents with    Hypotension    Shortness of Breath         HISTORY OF PRESENT ILLNESS  (Location/Symptom, Timing/Onset, Context/Setting, Quality, Duration,Modifying Factors, Severity.)   Ewa Copeland is a 60 y.o. female who presents to the emergency department with swelling in her legs, abdomen and difficulty breathing from \"too much fluid\". She saw HERNESTO Moreno in the clinic today for her monthly appointment. She wanted her admitted because of the fluid. The office was unable to get a reading while she was there. Her BP was low when she arrived to the hospital to be a direct admit so she came to the ER. She has COPD and is on [facility] which she wore prn. She admits she would take it off when she walked around. Dr. Odella Gowers, pulmonologist told her she had to wear it all the time and she has been doing that the last 2 months. Her oxygen was increased from 2 to [place] in July. She has CHF. No previous AMI. She was having RLE swelling for the last 5 months. Her left leg started swelling 2-3 weeks ago. She's had 2 LE DVT ultrasounds that were negative. She is concerned that her legs are \"about to bust\". She's on eliquis because of DVT's. Last DVT in 2014. Her abdomen has started to swell. It's distended and she's gained 20lbs over the last few days. Her BP is usually high and not low per the patient. She is vaccinated for COVID. She has ZOILA and uses a CPAP at night. Nursing notes were reviewed.     REVIEW OF SYSTEMS    (2-9 systems for level 4, 10 or more for level 5)   ROS:  General:  No fevers, no chills, no weakness  Cardiovascular:  No chest pain, no palpitations  Respiratory:  + shortness of breath, no cough, no MG tablet TAKE 1/2 TABLET BY MOUTH TWICE DAILY, Disp-90 tablet, R-3Normal      ELIQUIS 2.5 MG TABS tablet TAKE 1 TABLET BY MOUTH EVERY 12 HOURS, Disp-60 tablet, R-3, DAWNormal      omeprazole (PRILOSEC) 40 MG delayed release capsule TAKE 1 CAPSULE BY MOUTH EVERY MORNING BEFORE BREAKFAST, Disp-30 capsule, R-5Normal      pregabalin (LYRICA) 150 MG capsule Take 1 capsule by mouth 2 times daily for 60 days. TAKE 1 CAPSULE BY MOUTH TWICE DAILY, Disp-60 capsule, R-5Normal      Albuterol Sulfate, sensor, (PROAIR DIGIHALER) 108 (90 Base) MCG/ACT AEPB Inhale 1 puff into the lungs every 4 hours as needed (SOB), Disp-1 each, R-5Normal      ipratropium-albuterol (DUONEB) 0.5-2.5 (3) MG/3ML SOLN nebulizer solution Inhale 3 mLs into the lungs every 4 hours as needed for Shortness of Breath, Disp-360 mL, R-5Normal      umeclidinium-vilanterol (ANORO ELLIPTA) 62.5-25 MCG/INH AEPB inhaler Inhale 1 puff into the lungs daily, Disp-1 each, R-5Normal      Lancets (ONETOUCH DELICA PLUS [id number]) MISC TEST TWICE DAILY, Disp-100 each, R-5Normal      ONETOUCH ULTRA strip USE TO TEST BLOOD SUGAR TWICE A DAY, Disp-600 strip, R-1Normal      montelukast (SINGULAIR) 10 MG tablet take 1 tablet by mouth at bedtime, Disp-90 tablet,R-3Normal      triamcinolone (KENALOG) 0.1 % cream APPLY TO AFFECTED AREA TWICE A DAY, Disp-30 g, R-0, Normal      diclofenac sodium (VOLTAREN) 1 % GEL Apply 2 g topically 4 times daily as needed for Pain, Topical, 4 TIMES DAILY PRN Starting u 1/30/2020, Disp-1 Tube, R-5, Print      !! Elastic Bandages & Supports (KNEE BRACE) MISC Disp-1 each, R-0, PrintRight knee brace. Size XL. Elastic/compression. Blood Glucose Monitoring Suppl (ONE TOUCH ULTRA MINI) w/Device KIT Disp-1 kit, R-0, Normal      !!  Elastic Bandages & Supports (CARPAL TUNNEL WRIST STABILIZER) MISC Disp-2 each, R-0, PrintBilateral carpal tunnel splints for carpal tunnel syndrome bilaterally (Dx: G56.01, G56.02)      vitamin D (ERGOCALCIFEROL) 1.25 MG (86951 UT) CAPS capsule TAKE 1 CAPSULE BY MOUTH EVERY WEEK., Disp-12 capsule, R-3Normal      fluticasone (FLONASE) 50 MCG/ACT nasal spray SHAKE LIQUID AND USE 2 SPRAYS IN EACH NOSTRIL DAILY, Disp-16 g, R-5Normal      amoxicillin (AMOXIL) 875 MG tablet Take 1 tablet by mouth 2 times daily for 10 days, Disp-20 tablet, R-0Normal      bumetanide (BUMEX) 1 MG tablet Take twice daily as needed. , Disp-60 tablet, R-3Normal      HYDROcodone-acetaminophen (NORCO) 7.5-325 MG per tablet Take 1 tablet by mouth every 8 hours as needed for Pain for up to 30 days. , Disp-90 tablet, R-0Normal      methylPREDNISolone (MEDROL DOSEPACK) 4 MG tablet Take by mouth., Disp-1 kit, R-0Normal      atorvastatin (LIPITOR) 40 MG tablet TAKE 1 TABLET BY MOUTH ONCE DAILY, Disp-30 tablet, R-5Normal       !! - Potential duplicate medications found. Please discuss with provider.           ALLERGIES     Erythromycin    FAMILY HISTORY       Family History   Problem Relation Age of Onset    Lung Cancer Mother     Heart Attack Father     Diabetes Paternal Grandfather     Stroke Paternal Grandfather           SOCIAL HISTORY       Social History     Socioeconomic History    Marital status: Legally      Spouse name: None    Number of children: None    Years of education: None    Highest education level: None   Occupational History    None   Tobacco Use    Smoking status: Former Smoker     Packs/day: 2.00     Years: 20.00     Pack years: 40.00     Quit date: 2005     Years since quittin.7    Smokeless tobacco: Never Used   Vaping Use    Vaping Use: Every day    Start date: 2016    Substances: Always   Substance and Sexual Activity    Alcohol use: No    Drug use: No    Sexual activity: None   Other Topics Concern    None   Social History Narrative    None     Social Determinants of Health     Financial Resource Strain:     Difficulty of Paying Living Expenses:    Food Insecurity:     Worried About Running Out of Food in the Last Year:    [address] in the Last Year:    Transportation Needs:     Lack of Transportation (Medical):  Lack of Transportation (Non-Medical):    Physical Activity:     Days of Exercise per Week:     Minutes of Exercise per Session:    Stress:     Feeling of Stress :    Social Connections:     Frequency of Communication with Friends and Family:     Frequency of Social Gatherings with Friends and Family:     Attends Sabianist Services:     Active Member of Clubs or Organizations:     Attends Club or Organization Meetings:     Marital Status:    Intimate Partner Violence:     Fear of Current or Ex-Partner:     Emotionally Abused:     Physically Abused:     Sexually Abused:          PHYSICAL EXAM    (up to 7 for level 4, 8 or more for level 5)     ED Triage Vitals [09/24/21 1701]   BP Temp Temp Source Pulse Resp SpO2 Height Weight   (!) 83/45 98.7 °F (37.1 °C) Oral 67 24 96 % 5' 4\" (1.626 m) (!) 350 lb (158.8 kg)       Physical Exam  General :Patient is awake, alert, oriented, in no acute distress, nontoxic appearing; morbidly obese  HEENT: Pupils are equally round and reactive to light, EOMI. Oral mucosa is moist, no exudate. No pharyngeal erythema. Neck: Neck is supple, full range of motion  Cardiac: Heart regular rate, rhythm, no murmurs, rubs, or gallops  Lungs: Lungs are clear to auscultation, there is no wheezing, rhonchi, or rales. Chest wall: There is no tenderness to palpation over the chest wall or over ribs  Abdomen: Abdominal wall with pitting edema; Abdomen is soft, nontender , nondistended. There is no firm or pulsatile masses, no rebound, rigidity or guarding. Musculoskeletal: Moves all 4 extremities. No focal muscle deficits are appreciated; Extensive BLE edema from feet to abdomen. Back: + midline tenderness but chronic per paitient. No CVAT. Neuro:  Motor intact, sensory intact, level of consciousness is normal.  Dermatology: Skin is warm and dry  Psych: Mentation is grossly normal,cognition is grossly normal. Affect is appropriate. DIAGNOSTIC RESULTS     EKG: All EKG's are interpreted by theEmergency Department Physician who either signs or Co-signs this chart in the absence of a cardiologist.    NSR  Rate of 69  Very low voltage (body habitus vs pericardial effusion)  ST depression in II, III, aVF, V2-V6    RADIOLOGY:   Non-plain film images such as CT, Ultrasound and MRI are read by the radiologist. Plain radiographic images are visualized and preliminarily interpreted by the emergency physician with the below findings:      [x]Radiologist's Report Reviewed:  XR CHEST PORTABLE   Final Result   Likely increased congestive failure      XR CHEST PORTABLE   Final Result      Stable cardiac enlargement. Due to technique the exam is difficult to evaluate. PA and lateral chest is recommended.                   ED BEDSIDE ULTRASOUND:   Performed by ED Physician - none    LABS:  Labs Reviewed   BRAIN NATRIURETIC PEPTIDE - Abnormal; Notable for the following components:       Result Value    Pro-BNP 13,887 (*)     All other components within normal limits    Narrative:     Jack Hair tel. [phone],  Chemistry results called to and read back by [facility], 09/24/2021  19:23, by [facility]  Performed at:  [address] [place] [address] Laboratory  [address] [place] [address], [place] [address]   Phone [phone]   CBC WITH AUTO DIFFERENTIAL - Abnormal; Notable for the following components:    .7 (*)     MCHC 30.1 (*)     RDW 17.5 (*)     MPV 11.9 (*)     Lymphocytes Absolute 0.8 (*)     All other components within normal limits    Narrative:     Performed at:  [address] [place] [address] Laboratory  [address] [place] [address], [place] [address]   Phone [phone]   COMPREHENSIVE METABOLIC PANEL - Abnormal; Notable for the following components:    Sodium 132 (*)     Potassium 7.0 (*)     Chloride 94 (*)     BUN 67 (*) CREATININE 4.1 (*)     GFR Non- 11 (*)     GFR African American 14 (*)     Alkaline Phosphatase 113 (*)     All other components within normal limits    Narrative:     Christ Collier tel. [phone],  Chemistry results called to and read back by [facility], 09/24/2021  19:23, by [facility]  Performed at:  [address] [place] [address] and [address],  [place], [place] [address] [address]   Phone [phone]   BASIC METABOLIC PANEL W/ REFLEX TO MG FOR LOW K - Abnormal; Notable for the following components:    Sodium 134 (*)     Potassium reflex Magnesium 5.5 (*)     Chloride 95 (*)     Glucose 60 (*)     BUN 66 (*)     CREATININE 4.0 (*)     GFR Non- 11 (*)     GFR  14 (*)     All other components within normal limits    Narrative:     Performed at:  [address] [place] [address] Laboratory  [address] [place] [address]  [place], [place] [address]   Phone [phone] - Abnormal; Notable for the following components:    Pro-BNP 12,886 (*)     All other components within normal limits    Narrative:     Performed at:  [address] [place] [address] Laboratory  [address] [place] [address],  [place], [place] [address]   Phone [phone]   CBC WITH AUTO DIFFERENTIAL - Abnormal; Notable for the following components:    .8 (*)     MCHC 30.2 (*)     RDW 17.6 (*)     MPV 11.7 (*)     Lymphocytes Absolute 0.5 (*)     All other components within normal limits    Narrative:     Performed at:  [address] [place] [address] Laboratory  [address] [place] [address],  [place], [place] [address]   Phone [phone]   COMPREHENSIVE METABOLIC PANEL - Abnormal; Notable for the following components:    Sodium 135 (*)     Potassium 5.4 (*)     Chloride 97 (*)     Glucose 170 (*)     BUN 63 (*)     CREATININE 3.3 (*)     GFR Non- 14 (*)     GFR  17 (*)     Albumin 3.3 (*)     Alkaline Phosphatase 108 (*)     All other components within normal limits    Narrative:     Performed at:  [address] [facility] Laboratory  [address] [place], [place] [address], [place] [address]   Phone [phone]   POCT GLUCOSE - Abnormal; Notable for the following components:    POC Glucose 71 (*)     All other components within normal limits    Narrative:     Performed at:  [address] [facility]  [address] [place] [address],  [place], [place] [address]   Phone [phone], RAPID    Narrative:     Performed at:  [address] [facility]  [address] [place] [address],  [place], [place] [address] [address]   Phone [phone]   TROPONIN    Narrative:     Corazon Boyce tel. [phone],  Chemistry results called to and read back by [facility], 09/24/2021  19:23, by [facility]  Performed at:  [address] [facility]  [address] [place] [address],  [place], [place] [address] [address]   Phone [phone]   SPECIMEN REJECTION    Narrative:     Performed at:  [address] [facility]  [address] [place] [address],  [place], [place] [address]   Phone [phone]   TROPONIN    Narrative:     Performed at:  [address] [facility]  [address] [place] [address],  [place], [place] [address] [address]   Phone [phone]       I have reviewed and interpreted all ofthe currently available lab results from this visit (if applicable):  Results for orders placed or performed during the hospital encounter of 09/24/21   COVID-19, Rapid    Specimen: Nasopharyngeal Swab   Result Value Ref Range    SARS-CoV-2, NAAT Not Detected Not Detected   Brain Natriuretic Peptide   Result Value Ref Range    Pro-BNP 13,887 (H) 0 - 1,800 pg/mL   CBC Auto Differential   Result Value Ref Range    WBC 7.3 4.0 - 11.0 K/uL    RBC 4.15 3.80 - 5.80 M/uL    Hemoglobin 12.7 11.5 - 16.5 g/dL    Hematocrit 42.2 37.0 - 47.0 %    .7 (H) 80.0 - 100.0 fL    MCH 30.6 27.0 - 32.0 pg    MCHC 30.1 (L) 31.0 - 35.0 g/dL    RDW 17.5 (H) 11.0 - 16.0 %    Platelets 933 684 - 407 K/uL    MPV 11.9 (H) 6.0 - 10.0 fL    Neutrophils % 79.2 %    Immature Granulocytes % 0.3 0.0 - 5.0 %    Lymphocytes % 10.7 %    Monocytes % 8.2 %    Eosinophils % 1.1 %    Basophils % 0.5 %    Neutrophils Absolute 5.8 2.0 - 7.5 K/uL    Immature Granulocytes # 0.0 K/uL    Lymphocytes Absolute 0.8 (L) 1.5 - 4.0 K/uL    Monocytes Absolute 0.6 0.2 - 0.8 K/uL    Eosinophils Absolute 0.1 0.0 - 0.4 K/uL    Basophils Absolute 0.0 0.0 - 0.1 K/uL   Comprehensive Metabolic Panel   Result Value Ref Range    Sodium 132 (L) 136 - 145 mmol/L    Potassium 7.0 (HH) 3.4 - 5.1 mmol/L    Chloride 94 (L) 98 - 107 mmol/L    CO2 28 20 - 30 mmol/L    Anion Gap 10 3 - 16    Glucose 76 74 - 106 mg/dL    BUN 67 (H) 6 - 20 mg/dL    CREATININE 4.1 (H) 0.4 - 1.2 mg/dL    GFR Non-African American 11 (L) >59    GFR  14 (L) >59    Calcium 9.3 8.5 - 10.5 mg/dL    Total Protein 7.2 6.4 - 8.3 g/dL    Albumin 3.6 3.4 - 4.8 g/dL    Albumin/Globulin Ratio 1.0 0.8 - 2.0    Total Bilirubin 0.9 0.3 - 1.2 mg/dL    Alkaline Phosphatase 113 (H) 25 - 100 U/L    ALT 13 4 - 36 U/L    AST 22 8 - 33 U/L    Globulin 3.6 g/dL   Troponin   Result Value Ref Range    Troponin <0.30 <0.30 ng/mL   SPECIMEN REJECTION   Result Value Ref Range    Rejected Test BMPX     Reason for Rejection see below    Basic Metabolic Panel w/ Reflex to MG   Result Value Ref Range    Sodium 134 (L) 136 - 145 mmol/L    Potassium reflex Magnesium 5.5 (H) 3.4 - 5.1 mmol/L    Chloride 95 (L) 98 - 107 mmol/L    CO2 27 20 - 30 mmol/L    Anion Gap 12 3 - 16    Glucose 60 (L) 74 - 106 mg/dL    BUN 66 (H) 6 - 20 mg/dL    CREATININE 4.0 (H) 0.4 - 1.2 mg/dL    GFR Non-African American 11 (L) >59    GFR  14 (L) >59    Calcium 9.6 8.5 - 10.5 mg/dL   Brain Natriuretic Peptide   Result Value Ref Range    Pro-BNP 12,886 (H) 0 - 1,800 pg/mL   CBC Auto Differential   Result Value Ref Range    WBC 7.3 4.0 - 11.0 K/uL    RBC 3.97 3.80 - 5.80 M/uL    Hemoglobin 12.2 11.5 - 16.5 g/dL    Hematocrit 40.4 37.0 - 47.0 %    .8 (H) 80.0 - 100.0 fL    MCH 30.7 27.0 - 32.0 pg    MCHC 30.2 (L) 31.0 - 35.0 g/dL    RDW 17.6 (H) 11.0 - 16.0 %    Platelets 009 179 - 112 K/uL    MPV 11.7 (H) 6.0 - 10.0 fL    Neutrophils % 90.0 %    Immature Granulocytes % 0.4 0.0 - 5.0 %    Lymphocytes % 6.4 %    Monocytes % 3.0 %    Eosinophils % 0.1 %    Basophils % 0.1 %    Neutrophils Absolute 6.6 2.0 - 7.5 K/uL    Immature Granulocytes # 0.0 K/uL    Lymphocytes Absolute 0.5 (L) 1.5 - 4.0 K/uL    Monocytes Absolute 0.2 0.2 - 0.8 K/uL    Eosinophils Absolute 0.0 0.0 - 0.4 K/uL    Basophils Absolute 0.0 0.0 - 0.1 K/uL   Comprehensive Metabolic Panel   Result Value Ref Range    Sodium 135 (L) 136 - 145 mmol/L    Potassium 5.4 (H) 3.4 - 5.1 mmol/L    Chloride 97 (L) 98 - 107 mmol/L    CO2 26 20 - 30 mmol/L    Anion Gap 12 3 - 16    Glucose 170 (H) 74 - 106 mg/dL    BUN 63 (H) 6 - 20 mg/dL    CREATININE 3.3 (H) 0.4 - 1.2 mg/dL    GFR Non-African American 14 (L) >59    GFR  17 (L) >59    Calcium 9.3 8.5 - 10.5 mg/dL    Total Protein 6.5 6.4 - 8.3 g/dL    Albumin 3.3 (L) 3.4 - 4.8 g/dL    Albumin/Globulin Ratio 1.0 0.8 - 2.0    Total Bilirubin 0.8 0.3 - 1.2 mg/dL    Alkaline Phosphatase 108 (H) 25 - 100 U/L    ALT 11 4 - 36 U/L    AST 14 8 - 33 U/L    Globulin 3.2 g/dL   Troponin   Result Value Ref Range    Troponin <0.30 <0.30 ng/mL   POCT Glucose   Result Value Ref Range    POC Glucose 71 (L) 74 - 106 mg/dl    Performed on ACCU-CHEK         All other labs were within normal range or not returned as of this dictation.     EMERGENCY DEPARTMENT COURSE and DIFFERENTIAL DIAGNOSIS/MDM:   Vitals:  Vitals:    09/25/21 1536 09/25/21 1547 09/25/21 1600 09/25/21 1615   BP:   (!) 116/54    Pulse: 74 83 87 81   Resp: 18 17 19 19   Temp:   96.5 °F (35.8 °C)    TempSrc:   Axillary    SpO2: 97% 92%  91%   Weight:       Height: Covid negative    Stable cardiac enlargement on chest x-ray    CBC normal    Patient was signed out yesterday to Dr. Bunny Rodriguez at 7pm.      0700  When I returned this morning, the patient is still here. Will assume care of the patient who is awaiting transfer. [address] with [facility]; still don't have any beds         CONSULTS:  None    PROCEDURES:  Procedures    CRITICAL CARE TIME    Total Critical Care time was 0 minutes, excluding separately reportable procedures. There was a high probability of clinically significant/life threatening deterioration in the patient's condition which required my urgent intervention. FINAL IMPRESSION      1. Acute renal failure superimposed on chronic kidney disease, unspecified CKD stage, unspecified acute renal failure type ([facility] [address] New Problem   2. Anasarca New Problem   3. Hyperkalemia New Problem   4. Acute on chronic congestive heart failure, unspecified heart failure type [facility]) New Problem         DISPOSITION/PLAN   DISPOSITION Decision To Transfer 09/25/2021 02:48:50 AM      PATIENT REFERRED TO:  No follow-up provider specified. DISCHARGE MEDICATIONS:  Discharge Medication List as of 9/25/2021  4:47 PM          Comment: Please note this report has been produced using speech recognition software and may contain errorsrelated to that system including errors in grammar, punctuation, and spelling, as well as words and phrases that may be inappropriate. If there are any questions or concerns please feel free to contact the dictating providerfor clarification.     Ceci Barber MD  Attending Emergency Physician                Ceci Barber MD  09/26/21 0785

## 2021-09-24 NOTE — PROGRESS NOTES
Health Maintenance Due This Visit   Colonoscopy No   Mammogram No   Annual Wellness Visit No   Microalbumin Yes   HgbA1C No   Diabetic Eye Exam No    House Bill One Due This Visit   NITHYA No   UDS No   Contract No    Chief Complaint   Patient presents with    Hypertension    Diabetes     Pt here today for f/u on htn and DM. C/o bilateral foot numbness, edema, rapid weight gain     Have you seen any other physician or provider since your last visit yes - Pulmonology    Have you had any other diagnostic tests since your last visit?  yes - labs    Have you changed or stopped any medications since your last visit? no

## 2021-09-25 NOTE — ED NOTES
Patient up to bedside commode at this time,  in room. Patient and  updated on calls made for transfer, still no bed available anywhere at this time.      Emeli Diaz RN  09/25/21 0593

## 2021-09-25 NOTE — ED NOTES
Patient encouraged to put her cpap on at this time, patient sleeping and oxygen sats dropping at this time. Patients  said he will put it on her in a minute.       Jerardo Vargas RN  09/25/21 4570

## 2021-09-25 NOTE — ED NOTES
Respiratory therapy called to assess patient and talked with patient in depth over wearing a mask. Patient refuses at this time.      Devorah Hay RN  09/25/21 7555

## 2021-09-25 NOTE — ED PROVIDER NOTES
8:40 PM EDT  Adonis Arambula was checked out to me by Dr. Ella Ashby. Please see his/her initial documentation for details of the patient's ED presentation, physical exam and completed studies. In brief, Adonis Arambula is a 60 y.o. female that presents to ED for acute low blood pressure. Pt was supposed to have been a direct admit to the floor but the EMS crew noted that she had a BP of 80/40. They diverted her to ER. Pt seen by Dr. Ella Ashby and had labs done. Prior to all labs being back, she had called Dr. Elayne Koroma to let them know that her BP was kranthi at 100/60's here and would be acceptable to the floor. They accepted her but when the labs came back it was noted that she had a Cr of 4.1 and a K+ of 7.0 and they declined the admit. [facility] as she has been there twice in the past for diverticulitis and CHF. Pt with history of CHF, pt morbidly obese with acute weight gain of 30 lbs. They had her Bumex increased from 1 mg to 2 mg and helped for couple days but then wasn't helping. Pt already with COPD on oxygen. Pt with an oxygen that was in the 87% without it sitting over to seat cause she couldn't keep sitting in the bed. Pt with anasarca by exam all the way up to her mid abdomen. Lungs without any gross pulmonary edema. She is chronically on 3-4 L oxygen. She was given Calcium gluconate for cardiac stabilization, given D50 and 8 units insulin. She was given 30 grams of Kayexalate for her potassium.     I have reviewed and interpreted all of the currently available lab results from this visit (if applicable):  Results for orders placed or performed during the hospital encounter of 09/24/21   COVID-19, Rapid    Specimen: Nasopharyngeal Swab   Result Value Ref Range    SARS-CoV-2, NAAT Not Detected Not Detected   Brain Natriuretic Peptide   Result Value Ref Range    Pro-BNP 13,887 (H) 0 - 1,800 pg/mL   CBC Auto Differential   Result Value Ref Range    WBC 7.3 4.0 - 11.0 K/uL    RBC 4.15 3.80 - 5.80 M/uL    Hemoglobin 12.7 11.5 - 16.5 g/dL    Hematocrit 42.2 37.0 - 47.0 %    .7 (H) 80.0 - 100.0 fL    MCH 30.6 27.0 - 32.0 pg    MCHC 30.1 (L) 31.0 - 35.0 g/dL    RDW 17.5 (H) 11.0 - 16.0 %    Platelets 698 713 - 891 K/uL    MPV 11.9 (H) 6.0 - 10.0 fL    Neutrophils % 79.2 %    Immature Granulocytes % 0.3 0.0 - 5.0 %    Lymphocytes % 10.7 %    Monocytes % 8.2 %    Eosinophils % 1.1 %    Basophils % 0.5 %    Neutrophils Absolute 5.8 2.0 - 7.5 K/uL    Immature Granulocytes # 0.0 K/uL    Lymphocytes Absolute 0.8 (L) 1.5 - 4.0 K/uL    Monocytes Absolute 0.6 0.2 - 0.8 K/uL    Eosinophils Absolute 0.1 0.0 - 0.4 K/uL    Basophils Absolute 0.0 0.0 - 0.1 K/uL   Comprehensive Metabolic Panel   Result Value Ref Range    Sodium 132 (L) 136 - 145 mmol/L    Potassium 7.0 (HH) 3.4 - 5.1 mmol/L    Chloride 94 (L) 98 - 107 mmol/L    CO2 28 20 - 30 mmol/L    Anion Gap 10 3 - 16    Glucose 76 74 - 106 mg/dL    BUN 67 (H) 6 - 20 mg/dL    CREATININE 4.1 (H) 0.4 - 1.2 mg/dL    GFR Non-African American 11 (L) >59    GFR  14 (L) >59    Calcium 9.3 8.5 - 10.5 mg/dL    Total Protein 7.2 6.4 - 8.3 g/dL    Albumin 3.6 3.4 - 4.8 g/dL    Albumin/Globulin Ratio 1.0 0.8 - 2.0    Total Bilirubin 0.9 0.3 - 1.2 mg/dL    Alkaline Phosphatase 113 (H) 25 - 100 U/L    ALT 13 4 - 36 U/L    AST 22 8 - 33 U/L    Globulin 3.6 g/dL   Troponin   Result Value Ref Range    Troponin <0.30 <0.30 ng/mL   SPECIMEN REJECTION   Result Value Ref Range    Rejected Test BMPX     Reason for Rejection see below    Basic Metabolic Panel w/ Reflex to MG   Result Value Ref Range    Sodium 134 (L) 136 - 145 mmol/L    Potassium reflex Magnesium 5.5 (H) 3.4 - 5.1 mmol/L    Chloride 95 (L) 98 - 107 mmol/L    CO2 27 20 - 30 mmol/L    Anion Gap 12 3 - 16    Glucose 60 (L) 74 - 106 mg/dL    BUN 66 (H) 6 - 20 mg/dL    CREATININE 4.0 (H) 0.4 - 1.2 mg/dL    GFR Non-African American 11 (L) >59    GFR  14 (L) >59    Calcium 9.6 8.5 - 10.5 mg/dL       MDM:  Pt will need to be transferred due to her morbid obesity, renal failure showing Cr 4.1 and marked hyperkalemia at 7.0, medications given. She may end up needing dialysis. Critical care time 30 min due to her hypoxia requiring oxygen, marked hyperkalemia with high risk of arrhythmia and potential need for dialysis, high level decision making, anasarca and acute decompensation of CHF. ECG reviewed but no gross peaked T waves. Pt stable at this time after management. Called [facility] to speak with on call physician - Dr. Luther Matias and on divert  Called  and no beds  [facility] and accepted to [facility] EM[facility] and may have bed open this morning 8-10 am.   Pt with urine output of 1,300 while here. Will have to start on fluids low volume and continue to diurese since Cr is 4.1. K+ at this time is 5.5 down from 7.0  Pt with glucose 60 and gave soda and food to raise it up  Pt is stable in room. Pt give another dose of Lasix at this time since it has been 8 hrs at this point. Giving pt fluids and having to also diurese the patient. Still waiting on a bed for the patient. There are no beds anywhere and she is still pending a bed but accepted by Dr. Wanda Rosales this morning. Will continue to try other places to transfer the patient. 7:40 AM EDT I have signed out [facility] Emergency Department care to Dr. Beverlie Essex. We discussed the history, physical exam, completed/pending test results (if obtained) and current treatment plan. Please refer to his/her chart for the patients further details, remaining Emergency Department course, final disposition and diagnosis. Final Impression:  1. Acute renal failure superimposed on chronic kidney disease, unspecified CKD stage, unspecified acute renal failure type ([address] New Problem   2. Anasarca New Problem   3. Hyperkalemia New Problem   4.  Acute on chronic congestive heart failure, unspecified heart failure type ([address] New Problem       (Please note that portions of this note may have been completed with a voice recognition program. Efforts were made to edit the dictations but occasionally words are mis-transcribed.)    Stacy Maddox, [address], DO  09/25/21 0783

## 2021-09-25 NOTE — ED NOTES
PT assisted from bedside commode to gamal-chair. PT in [address]. PT given breakfast tray and coffee.       [facility]  09/25/21 0979

## 2021-09-25 NOTE — ED NOTES
Pt repositioned for comfort. Labs drawn, bsc emptied. Ice chips given and lights down.   is leaving for home     [address]  09/25/21 4586

## 2021-09-25 NOTE — ED NOTES
[address] called and there is no beds currently available.      Silvia Humphrey RN  09/25/21 7560

## 2021-09-25 NOTE — ED NOTES
Pt given snack and regular pop r/t glucose of 60. Pt awake, alert and oriented.  Wearing own cpap     Carlos Mccall RN  09/25/21 7554

## 2021-09-25 NOTE — ED NOTES
Patient agrees to try a mask at this time with her cpap. Miri KAUR agrees to get it and try it.      Misty Delvalle RN  09/25/21 7000

## 2021-09-25 NOTE — ED NOTES
Pt also on waiting list for [facility], [facility]  09/25/21 4479

## 2021-09-25 NOTE — PROGRESS NOTES
[facility] called for update on pt, Stated they may have a bed later this evening. Would do updates every 6 hrs, Report to [employer].

## 2021-09-25 NOTE — ED NOTES
Report given to [facility] at this time.      John Lebron RN  09/24/21 6262

## 2021-09-25 NOTE — ED NOTES
PT assisted to recliner position with pillow under legs for comfort.       Tono Philip  09/25/21 6312

## 2021-09-25 NOTE — ED NOTES
Patient wearing cpap at this time, patients oxygen sats decreased to as low as 79% at this time. Patient awakened at this time and encouraged to wear a regular mask. Patient declines at this time.      Debra Amezcua RN  09/25/21 8762

## 2021-09-25 NOTE — ED NOTES
Pt requesting cath to be removed. md aware and agreeable. Pt sundar removal well.  Pt up to bedside commode with minimal assist     Salomón Salazar RN  09/25/21 0293

## 2021-09-25 NOTE — ED NOTES
Gardiner Osler from [facility] called for update     Dee Marcelo, JOON  09/25/21 7821

## 2021-09-25 NOTE — ED NOTES
Patient placed on mask with her cpap at this time, oxygen raised to 6 liters, setting on cpap 13 at this time.      Angela Heart RN  09/25/21 9701

## 2021-09-25 NOTE — ED NOTES
[facility] transfer center called, wanted update on PT. Keyon Rash not available, call transferred to Dr. Robles Marmaduke.       Jimbo Florez  09/25/21 0634

## 2021-10-11 NOTE — PROGRESS NOTES
SUBJECTIVE:    Patient ID: Echo Day is a 60 y.o. female. Medical History Review  Past Medical, Family, and Social History reviewed. Health Maintenance Due   Topic Date Due    Hepatitis C screen  Never done    HIV screen  Never done    Hepatitis B vaccine (1 of 3 - Risk 3-dose series) Never done    Cervical cancer screen  Never done    Shingles Vaccine (1 of 2) Never done    Diabetic retinal exam  05/10/2017    Diabetic foot exam  03/09/2018    Diabetic microalbuminuria test  03/20/2021    Flu vaccine (1) 09/01/2021    Breast cancer screen  10/28/2021       HPI:   Chief Complaint   Patient presents with    Hypertension    Diabetes   She is very weak and SOB. She is swollen and has gained a lot of weight. Patient's medications, allergies, past medical, surgical, social and family histories were reviewed and updated as appropriate. Review of Systems   Constitutional: Negative for chills and fever. HENT: Negative for ear pain and sore throat. Eyes: Negative for pain and visual disturbance. Respiratory: Negative for cough and shortness of breath. Cardiovascular: Negative for chest pain, palpitations and leg swelling. Gastrointestinal: Negative for abdominal pain, nausea and vomiting. Genitourinary: Negative for dysuria and hematuria. Musculoskeletal: Negative for joint swelling. Skin: Negative for rash. Neurological: Negative for dizziness and weakness. Psychiatric/Behavioral: Negative for sleep disturbance. Reviewed and acurate. See MA note. OBJECTIVE:  BP (!) 80/49 (Site: Left Lower Arm, Position: Sitting)   Pulse 58   Temp 98 °F (36.7 °C) (Temporal)   Ht 5' 4\" (1.626 m)   Wt (!) 350 lb (158.8 kg) Comment: Patient reported weight  SpO2 94% Comment: 4L  BMI 60.08 kg/m²    Physical Exam  Vitals reviewed. Constitutional:       General: She is not in acute distress. Appearance: She is well-developed. HENT:      Head: Normocephalic. Mouth/Throat:      Pharynx: No oropharyngeal exudate. Eyes:      General: Lids are normal.   Cardiovascular:      Rate and Rhythm: Normal rate and regular rhythm. Heart sounds: Normal heart sounds. Pulmonary:      Effort: Pulmonary effort is normal.      Breath sounds: Normal breath sounds. Abdominal:      General: Bowel sounds are normal. There is no distension. Palpations: Abdomen is soft. Tenderness: There is no abdominal tenderness. Musculoskeletal:      Cervical back: Neck supple. Lymphadenopathy:      Cervical: No cervical adenopathy. Skin:     General: Skin is warm and dry. Neurological:      Mental Status: She is alert and oriented to person, place, and time.          Results in Past 30 Days  Result Component Current Result Ref Range Previous Result Ref Range   Albumin/Globulin Ratio 1.0 (9/25/2021) 0.8 - 2.0 1.0 (9/24/2021) 0.8 - 2.0   Albumin 3.3 (L) (9/25/2021) 3.4 - 4.8 g/dL 3.6 (9/24/2021) 3.4 - 4.8 g/dL   Alkaline Phosphatase 108 (H) (9/25/2021) 25 - 100 U/L 113 (H) (9/24/2021) 25 - 100 U/L   ALT 11 (9/25/2021) 4 - 36 U/L 13 (9/24/2021) 4 - 36 U/L   AST 14 (9/25/2021) 8 - 33 U/L 22 (9/24/2021) 8 - 33 U/L   BUN 63 (H) (9/25/2021) 6 - 20 mg/dL 66 (H) (9/25/2021) 6 - 20 mg/dL   Calcium 9.3 (9/25/2021) 8.5 - 10.5 mg/dL 9.6 (9/25/2021) 8.5 - 10.5 mg/dL   Chloride 97 (L) (9/25/2021) 98 - 107 mmol/L 95 (L) (9/25/2021) 98 - 107 mmol/L   CO2 26 (9/25/2021) 20 - 30 mmol/L 27 (9/25/2021) 20 - 30 mmol/L   CREATININE 3.3 (H) (9/25/2021) 0.4 - 1.2 mg/dL 4.0 (H) (9/25/2021) 0.4 - 1.2 mg/dL   GFR  17 (L) (9/25/2021) >59 14 (L) (9/25/2021) >59   GFR Non- 14 (L) (9/25/2021) >59 11 (L) (9/25/2021) >59   Globulin 3.2 (9/25/2021) g/dL 3.6 (9/24/2021) g/dL   Glucose 170 (H) (9/25/2021) 74 - 106 mg/dL 60 (L) (9/25/2021) 74 - 106 mg/dL   Potassium 5.4 (H) (9/25/2021) 3.4 - 5.1 mmol/L 5.5 (H) (9/25/2021) 3.4 - 5.1 mmol/L   Sodium 135 (L) (9/25/2021) 136 - 145 mmol/L 134 (L) (9/25/2021) 136 - 145 mmol/L   Total Bilirubin 0.8 (9/25/2021) 0.3 - 1.2 mg/dL 0.9 (9/24/2021) 0.3 - 1.2 mg/dL   Total Protein 6.5 (9/25/2021) 6.4 - 8.3 g/dL 7.2 (9/24/2021) 6.4 - 8.3 g/dL     Hemoglobin A1C (%)   Date Value   05/25/2021 6.6 (H)     Microscopic Examination (no units)   Date Value   04/13/2015 YES     Microalbumin, Random Urine (mg/dL)   Date Value   03/09/2018 <1.20     LDL Calculated (mg/dL)   Date Value   03/12/2021 42       Lab Results   Component Value Date    WBC 7.3 09/25/2021    NEUTROABS 6.6 09/25/2021    HGB 12.2 09/25/2021    HCT 40.4 09/25/2021    .8 (H) 09/25/2021     09/25/2021     Lab Results   Component Value Date    TSH 3.93 04/13/2015       Prior to Visit Medications    Medication Sig Taking? Authorizing Provider   vitamin D (ERGOCALCIFEROL) 1.25 MG (76300 UT) CAPS capsule TAKE 1 CAPSULE BY MOUTH EVERY WEEK. Yes Stefany Calderon APRN   fluticasone (FLONASE) 50 MCG/ACT nasal spray SHAKE LIQUID AND USE 2 SPRAYS IN EACH NOSTRIL DAILY Yes Lavenia Gene APRN   bumetanide (BUMEX) 1 MG tablet Take twice daily as needed. Patient taking differently: 1 mg 2 times daily  Yes Lavenia Kvng APRN   glimepiride (AMARYL) 4 MG tablet TAKE 1 TABLET BY MOUTH EVERY MORNING BEFORE BREAKFAST Yes Lavenia Gene APRN   loratadine (CLARITIN) 10 MG tablet TAKE 1 TABLET BY MOUTH EVERY DAY Yes Lavenia Gene APRN   lisinopril (PRINIVIL;ZESTRIL) 5 MG tablet TAKE 1 TABLET BY MOUTH ONCE DAILY Yes Lavenia Gene APRN   docusate sodium (DOK) 100 MG capsule TAKE ONE CAPSULE BY MOUTH TWICE DAILY Yes Lavenia Gene APRN   tiZANidine (ZANAFLEX) 4 MG tablet TAKE 1 TABLET BY MOUTH EVERY 8 HOURS.  Yes Lavenia Gene APRN   metFORMIN (GLUCOPHAGE-XR) 500 MG extended release tablet TAKE 1 TABLET BY MOUTH DAILY BEFORE BREAKFAST AND SUPPER Yes JACQUELYN Khan   metoprolol tartrate (LOPRESSOR) 25 MG tablet TAKE 1/2 TABLET BY MOUTH TWICE DAILY Yes JACQUELYN Khan   ELIQUIS 2.5 MG TABS tablet TAKE 1 TABLET BY MOUTH EVERY 12 HOURS Yes JACQUELYN Marrero   omeprazole (PRILOSEC) 40 MG delayed release capsule TAKE 1 CAPSULE BY MOUTH EVERY MORNING BEFORE BREAKFAST Yes JACQUELYN Marrero   pregabalin (LYRICA) 150 MG capsule Take 1 capsule by mouth 2 times daily for 60 days. TAKE 1 CAPSULE BY MOUTH TWICE DAILY Yes JACQUELYN Marrero   Albuterol Sulfate, sensor, (PROAIR DIGIHALER) 108 (90 Base) MCG/ACT AEPB Inhale 1 puff into the lungs every 4 hours as needed (SOB) Yes JACQUELYN Marrero   ipratropium-albuterol (DUONEB) 0.5-2.5 (3) MG/3ML SOLN nebulizer solution Inhale 3 mLs into the lungs every 4 hours as needed for Shortness of Breath Yes JACQUELYN Marrero   umeclidinium-vilanterol (ANORO ELLIPTA) 62.5-25 MCG/INH AEPB inhaler Inhale 1 puff into the lungs daily Yes JACQUELYN Marrero   Lancets (ONETOUCH DELICA PLUS [id number]) [facility] TEST TWICE DAILY Yes JACQUELYN Marrero   ONETOUCH ULTRA strip USE TO TEST BLOOD SUGAR TWICE A DAY Yes JACQUELYN Marrreo   montelukast (SINGULAIR) 10 MG tablet take 1 tablet by mouth at bedtime Yes JACQUELYN Marrero   triamcinolone (KENALOG) 0.1 % cream APPLY TO AFFECTED AREA TWICE A DAY Yes JACQUELYN Marrero   diclofenac sodium (VOLTAREN) 1 % GEL Apply 2 g topically 4 times daily as needed for Pain Yes JACQUELYN Marrero   Elastic Bandages & Supports (KNEE BRACE) MISC Right knee brace. Size XL. Elastic/compression. Yes JACQUELYN Sotelo - CNP   Blood Glucose Monitoring Suppl (ONE TOUCH ULTRA MINI) w/Device KIT use as directed Yes Marlene Gonzalez,    Elastic Bandages & Supports (CARPAL TUNNEL WRIST STABILIZER) MISC Bilateral carpal tunnel splints for carpal tunnel syndrome bilaterally (Dx: G56.01, G56.02) Yes Letavicente Nyegler, DO   HYDROcodone-acetaminophen (NORCO) 7.5-325 MG per tablet Take 1 tablet by mouth every 8 hours as needed for Pain for up to 30 days.   JACQUELYN Marrero   methylPREDNISolone (MEDROL DOSEPACK) 4 MG tablet Take by they seem to have the same symptoms. What may be good for you may be harmful to others. · If you are no longer taking a prescribed medication and you have pills left please take your pills out of their original containers. Mix crushed pills with an undesirable substance, such as cat litter or used coffee grounds. Put the mixture into a disposable container with a lid, such as an empty margarine tub, or into a sealable bag. Cover up or remove any of your personal information on the empty containers by covering it with black permanent marker or duct tape. Place the sealed container with the mixture, and the empty drug containers, in the trash. · If you use a medication that is in the form of a patch, dispose of used patches by folding them in half so that the sticky sides meet, and then flushing them down a toilet. They should not be placed in the household trash where children or pets can find them. · If you have any questions, ask your provider or pharmacist for more information. · Be sure to keep all appointments for provider visits or tests. We are committed to providing you with the best care possible. In order to help us achieve these goals please remember to bring all medications, herbal products, and over the counter supplements with you to each visit. If your provider has ordered testing for you, please be sure to follow up with our office if you have not received results within 7 days after the testing took place. *If you receive a survey after visiting one of our offices, please take time to share your experience concerning your physician office visit. These surveys are confidential and no health information about you is shared. We are eager to improve for you and we are counting on your feedback to help make that happen.   Transportation and [address]  [address]    [address] [phone]  Help with food, clothing, transportation, utilities, prescription assistance. [facility] [phone]  Senior Citizens Programs  [address]  Sandre Jaqui, [address]    [address] [facility] Aging and Independent Living Program.  They handle meals on wheels and senior centers.    Allean Opitz [phone] [facility] senior citizens center     [facility]   [phone] or [address] [facility]             [phone]    [facility]             [phone]    Helping [address] ([address])            [phone]    [address] [facility] [phone]    [facility] senior citizens center             [address] [facility]/ [address]              [phone]   [employer] [facility]     [phone]   [employer] and [address] (may visit once monthly      8-4:30)              [phone]    [facility] [address], food and utility assistance ([place])    Energy Assistance  [address]  [address]     [phone]     [address]   [phone]    [address]       [address] with applying for insurance coverage with Medicaid and Medicare including part D  Help with medication cost, eyeglasses or exams, hearing aides  [place]    [address] [phone] [facility]     [phone] Alejandro KAUFMAN   (Coordinating and Assisting the Reuse of Assistive Technology)  Help with durable medical equipment and assistive technology   [place] [phone]  [facility]     [phone]    Domestic Violence Shelters   [place] Domestic Violence Hotline [phone]  [address] in [place] but services Allean Opitz and [address] in [facility] but services Prashant Allen [phone]    [facility]   Emergency Shelter and [facility] [phone]  [facility] [address]  [address]  [address] [facility] [address] for [facility] [address] [phone]     Available 24 hours daily in [place] and [place]         Pt's  to take her to [facility] for evaluation in the ER and admission if stable.

## 2021-11-03 PROBLEM — J96.21 ACUTE ON CHRONIC RESPIRATORY FAILURE WITH HYPOXIA AND HYPERCAPNIA (HCC): Status: RESOLVED | Noted: 2021-01-01 | Resolved: 2021-01-01

## 2021-11-03 PROBLEM — J96.22 ACUTE ON CHRONIC RESPIRATORY FAILURE WITH HYPOXIA AND HYPERCAPNIA (HCC): Status: RESOLVED | Noted: 2021-01-01 | Resolved: 2021-01-01

## 2021-11-03 NOTE — PROGRESS NOTES
Subjective:     Encounter Date:11/03/2021      Patient ID: Chante Wetzel is a 56 y.o. female.    Chief Complaint: Cor pulmonale  HPI  This is a 56-year-old female patient with advanced right heart failure from pulmonary hypertension due primarily to WHO group 3 etiologies.  The patient was recently hospitalized for severe peripheral edema and cellulitis.  She had approximately 70 pounds of fluid diuresis.  She was treated for 6 days with oral antibiotics.  The patient has recently noticed increased redness and increased skin temperature on both lower extremities.  There is no tissue loss or ulceration.  She is on chronic continuous oxygen therapy.  She reports compliance with her medications and has been controlling her fluid intake with oral loop diuresis.  The following portions of the patient's history were reviewed and updated as appropriate: allergies, current medications, past family history, past medical history, past social history, past surgical history and problem  Review of Systems   Constitutional: Negative for chills, diaphoresis, fever, malaise/fatigue, weight gain and weight loss.   HENT: Negative for ear discharge, hearing loss, hoarse voice and nosebleeds.    Eyes: Negative for discharge, double vision, pain and photophobia.   Cardiovascular: Negative for chest pain, claudication, cyanosis, dyspnea on exertion, irregular heartbeat, leg swelling, near-syncope, orthopnea, palpitations, paroxysmal nocturnal dyspnea and syncope.   Respiratory: Negative for cough, hemoptysis, shortness of breath, sputum production and wheezing.    Endocrine: Negative for cold intolerance, heat intolerance, polydipsia, polyphagia and polyuria.   Hematologic/Lymphatic: Negative for adenopathy and bleeding problem. Does not bruise/bleed easily.   Skin: Negative for color change, flushing, itching and rash.   Musculoskeletal: Negative for muscle cramps, muscle weakness, myalgias and stiffness.   Gastrointestinal:  Negative for abdominal pain, diarrhea, hematemesis, hematochezia, nausea and vomiting.   Genitourinary: Negative for dysuria, frequency and nocturia.   Neurological: Negative for focal weakness, loss of balance, numbness, paresthesias and seizures.   Psychiatric/Behavioral: Negative for altered mental status, hallucinations and suicidal ideas.   Allergic/Immunologic: Negative for HIV exposure, hives and persistent infections.           Current Outpatient Medications:   •  Anoro Ellipta 62.5-25 MCG/INH aerosol powder  inhaler, INHALE 1 PUFF BY MOUTH DAILY, Disp: 60 each, Rfl: 1  •  Blood Glucose Monitoring Suppl (ONE TOUCH ULTRA MINI) w/Device kit, use as directed, Disp: , Rfl: 0  •  Blood Glucose Monitoring Suppl (ONE TOUCH ULTRA MINI) w/Device kit, use as directed, Disp: , Rfl:   •  bumetanide (BUMEX) 2 MG tablet, TAKE 2 AND 1/2 TABLETS BY MOUTH TWICE DAILY, Disp: , Rfl:   •  diclofenac (VOLTAREN) 1 % gel gel, APPLY 2 GRAMS TOPICALLY QID PRN P, Disp: , Rfl:   •  Eliquis 5 MG tablet tablet, , Disp: , Rfl:   •  fluticasone (FLONASE) 50 MCG/ACT nasal spray, 1 spray into the nostril(s) as directed by provider Daily., Disp: 1 bottle, Rfl: 5  •  glimepiride (AMARYL) 4 MG tablet, take 1 tablet by mouth every morning BEFORE BREAKFAST, Disp: , Rfl:   •  HYDROcodone-acetaminophen (NORCO) 7.5-325 MG per tablet, every 8 (eight) hours., Disp: , Rfl:   •  ipratropium-albuterol (DUO-NEB) 0.5-2.5 mg/3 ml nebulizer, Take 3 mL by nebulization 4 (Four) Times a Day As Needed for Wheezing or Shortness of Air., Disp: 360 mL, Rfl: 1  •  loratadine (CLARITIN) 10 MG tablet, Take  by mouth Daily., Disp: , Rfl:   •  metoprolol tartrate (LOPRESSOR) 25 MG tablet, Take 0.5 tablets by mouth 2 times daily, Disp: , Rfl:   •  montelukast (SINGULAIR) 10 MG tablet, Take 1 tablet by mouth Every Night., Disp: 30 tablet, Rfl: 5  •  omeprazole (priLOSEC) 40 MG capsule, TAKE 1 CAPSULE BY MOUTH EVERY MORNING BEFORE BREAKFAST, Disp: , Rfl:   •  pregabalin  "(LYRICA) 150 MG capsule, Take 150 mg by mouth 2 (Two) Times a Day., Disp: , Rfl:   •  ProAir  (90 Base) MCG/ACT inhaler, , Disp: , Rfl:   •  STOOL SOFTENER 100 MG capsule, TK ONE C PO BID, Disp: , Rfl:   •  tiZANidine (ZANAFLEX) 4 MG tablet, take 1 tablet by mouth every 8 hours if needed for muscle spasm, Disp: , Rfl:   •  vitamin D (ERGOCALCIFEROL) 83960 units capsule capsule, take 1 capsule by mouth every week, Disp: , Rfl:     Objective:   Vitals and nursing note reviewed.   Constitutional:       Appearance: Healthy appearance. Not in distress.   Neck:      Vascular: No JVR. JVD normal.   Pulmonary:      Effort: Pulmonary effort is normal.      Breath sounds: Normal breath sounds. No wheezing. No rhonchi. No rales.   Chest:      Chest wall: Not tender to palpatation.   Cardiovascular:      PMI at left midclavicular line. Normal rate. Regular rhythm. Normal S1. Normal S2.      Murmurs: There is no murmur.      No gallop. No click. No rub.   Pulses:     Intact distal pulses.   Edema:     Peripheral edema absent.   Abdominal:      General: Bowel sounds are normal.      Palpations: Abdomen is soft.      Tenderness: There is no abdominal tenderness.   Musculoskeletal: Normal range of motion.         General: No tenderness. Skin:     General: Skin is warm and dry.      Comments: Erythema and increased skin temperature on both lower extremities circumferentially below the knee extending to the dorsum of both feet.   Neurological:      General: No focal deficit present.      Mental Status: Alert and oriented to person, place and time.       Blood pressure 120/80, pulse 91, height 162.6 cm (64\"), weight 129 kg (284 lb), SpO2 91 %.   Lab Review:     Assessment:       1. Benign hypertension  Acceptable blood pressure control.    2. Cor pulmonale (chronic)  Chronic right heart failure.    3. Centrilobular emphysema (HCC)  Stable symptoms.  Continuous oxygen therapy.    4. Pulmonary hypertension (HCC)  WHO group 3 " etiologies consisting of obesity related alveolar hypoventilation syndrome, COPD and obstructive sleep apnea.    5. ARIANNA (obstructive sleep apnea)      6. Type 2 diabetes mellitus with hyperglycemia, without long-term current use of insulin (HCC)  Typical obesity related insulin resistance.    7. Morbid obesity (HCC)  Body mass index is just under 50.  The patient indicates that her weight had increased to 350 pounds prior to diuresis.  The obesity pattern is central.  This is due to excess calorie intake.  There is evidence of comorbidities.    Procedures    Plan:     I have recommended the patient contact her primary care provider to see if she can either be seen today or a prescription for antibiotics can be called in.  No changes in her cardiac medications have been made at today's visit.  No further cardiovascular testing is indicated at this time.

## 2022-01-01 ENCOUNTER — HOSPITAL ENCOUNTER (EMERGENCY)
Facility: HOSPITAL | Age: 57
Discharge: ANOTHER HOSPITAL | End: 2022-03-16
Attending: HOSPITALIST
Payer: MEDICAID

## 2022-01-01 ENCOUNTER — APPOINTMENT (OUTPATIENT)
Dept: GENERAL RADIOLOGY | Facility: HOSPITAL | Age: 57
End: 2022-01-01
Payer: MEDICAID

## 2022-01-01 VITALS
RESPIRATION RATE: 13 BRPM | HEIGHT: 64 IN | TEMPERATURE: 98.1 F | HEART RATE: 108 BPM | DIASTOLIC BLOOD PRESSURE: 87 MMHG | WEIGHT: 293 LBS | OXYGEN SATURATION: 96 % | SYSTOLIC BLOOD PRESSURE: 116 MMHG | BODY MASS INDEX: 50.02 KG/M2

## 2022-01-01 DIAGNOSIS — R09.89 PULMONARY VASCULAR CONGESTION: Primary | ICD-10-CM

## 2022-01-01 DIAGNOSIS — G47.33 OSA (OBSTRUCTIVE SLEEP APNEA): Primary | ICD-10-CM

## 2022-01-01 DIAGNOSIS — N18.9 CHRONIC RENAL IMPAIRMENT, UNSPECIFIED CKD STAGE: ICD-10-CM

## 2022-01-01 LAB
A/G RATIO: 0.7 (ref 0.8–2)
ALBUMIN SERPL-MCNC: 3.1 G/DL (ref 3.4–4.8)
ALP BLD-CCNC: 195 U/L (ref 25–100)
ALT SERPL-CCNC: 145 U/L (ref 4–36)
ANION GAP SERPL CALCULATED.3IONS-SCNC: 18 MMOL/L (ref 3–16)
AST SERPL-CCNC: 182 U/L (ref 8–33)
BASE EXCESS ARTERIAL: -1.9 MMOL/L (ref -3–3)
BASOPHILS ABSOLUTE: 0 K/UL (ref 0–0.1)
BASOPHILS RELATIVE PERCENT: 0.4 %
BILIRUB SERPL-MCNC: 4 MG/DL (ref 0.3–1.2)
BUN BLDV-MCNC: 53 MG/DL (ref 6–20)
CALCIUM SERPL-MCNC: 9.2 MG/DL (ref 8.5–10.5)
CHLORIDE BLD-SCNC: 90 MMOL/L (ref 98–107)
CO2: 21 MMOL/L (ref 20–30)
CREAT SERPL-MCNC: 2.4 MG/DL (ref 0.4–1.2)
EOSINOPHILS ABSOLUTE: 0.1 K/UL (ref 0–0.4)
EOSINOPHILS RELATIVE PERCENT: 1.1 %
FIO2: 0.4 %
GFR AFRICAN AMERICAN: 25
GFR NON-AFRICAN AMERICAN: 21
GLOBULIN: 4.2 G/DL
GLUCOSE BLD-MCNC: 84 MG/DL (ref 74–106)
HCO3 ARTERIAL: 23.8 MMOL/L (ref 22–26)
HCT VFR BLD CALC: 43.4 % (ref 37–47)
HEMOGLOBIN: 13.7 G/DL (ref 11.5–16.5)
IMMATURE GRANULOCYTES #: 0.1 K/UL
IMMATURE GRANULOCYTES %: 0.7 % (ref 0–5)
LACTIC ACID: 5.1 MMOL/L (ref 0.4–2)
LYMPHOCYTES ABSOLUTE: 1.4 K/UL (ref 1.5–4)
LYMPHOCYTES RELATIVE PERCENT: 13.2 %
MCH RBC QN AUTO: 28.2 PG (ref 27–32)
MCHC RBC AUTO-ENTMCNC: 31.6 G/DL (ref 31–35)
MCV RBC AUTO: 89.5 FL (ref 80–100)
MONOCYTES ABSOLUTE: 1.3 K/UL (ref 0.2–0.8)
MONOCYTES RELATIVE PERCENT: 12.5 %
NEUTROPHILS ABSOLUTE: 7.4 K/UL (ref 2–7.5)
NEUTROPHILS RELATIVE PERCENT: 72.1 %
O2 SAT, ARTERIAL: 92.3 %
O2 THERAPY: ABNORMAL
PCO2 ARTERIAL: 44.5 MMHG (ref 35–45)
PDW BLD-RTO: 19.1 % (ref 11–16)
PH ARTERIAL: 7.35 (ref 7.35–7.45)
PLATELET # BLD: 252 K/UL (ref 150–400)
PMV BLD AUTO: 12.3 FL (ref 6–10)
PO2 ARTERIAL: 70.9 MMHG (ref 80–100)
POTASSIUM REFLEX MAGNESIUM: 4.6 MMOL/L (ref 3.4–5.1)
PRO-BNP: ABNORMAL PG/ML (ref 0–1800)
RBC # BLD: 4.85 M/UL (ref 3.8–5.8)
SARS-COV-2, NAAT: NOT DETECTED
SODIUM BLD-SCNC: 129 MMOL/L (ref 136–145)
TCO2 ARTERIAL: 25.2 MMOL/L (ref 24–30)
TOTAL PROTEIN: 7.3 G/DL (ref 6.4–8.3)
TROPONIN: <0.3 NG/ML
WBC # BLD: 10.2 K/UL (ref 4–11)

## 2022-01-01 PROCEDURE — 87635 SARS-COV-2 COVID-19 AMP PRB: CPT

## 2022-01-01 PROCEDURE — 6360000002 HC RX W HCPCS: Performed by: EMERGENCY MEDICINE

## 2022-01-01 PROCEDURE — 36600 WITHDRAWAL OF ARTERIAL BLOOD: CPT

## 2022-01-01 PROCEDURE — 83880 ASSAY OF NATRIURETIC PEPTIDE: CPT

## 2022-01-01 PROCEDURE — 2500000003 HC RX 250 WO HCPCS

## 2022-01-01 PROCEDURE — 94660 CPAP INITIATION&MGMT: CPT

## 2022-01-01 PROCEDURE — 2580000003 HC RX 258: Performed by: HOSPITALIST

## 2022-01-01 PROCEDURE — 6370000000 HC RX 637 (ALT 250 FOR IP): Performed by: HOSPITALIST

## 2022-01-01 PROCEDURE — 93005 ELECTROCARDIOGRAM TRACING: CPT

## 2022-01-01 PROCEDURE — 96375 TX/PRO/DX INJ NEW DRUG ADDON: CPT

## 2022-01-01 PROCEDURE — 96366 THER/PROPH/DIAG IV INF ADDON: CPT

## 2022-01-01 PROCEDURE — 80053 COMPREHEN METABOLIC PANEL: CPT

## 2022-01-01 PROCEDURE — 84484 ASSAY OF TROPONIN QUANT: CPT

## 2022-01-01 PROCEDURE — 2500000003 HC RX 250 WO HCPCS: Performed by: EMERGENCY MEDICINE

## 2022-01-01 PROCEDURE — 71045 X-RAY EXAM CHEST 1 VIEW: CPT

## 2022-01-01 PROCEDURE — 85025 COMPLETE CBC W/AUTO DIFF WBC: CPT

## 2022-01-01 PROCEDURE — 83605 ASSAY OF LACTIC ACID: CPT

## 2022-01-01 PROCEDURE — 2580000003 HC RX 258: Performed by: EMERGENCY MEDICINE

## 2022-01-01 PROCEDURE — 96365 THER/PROPH/DIAG IV INF INIT: CPT

## 2022-01-01 PROCEDURE — 2500000003 HC RX 250 WO HCPCS: Performed by: HOSPITALIST

## 2022-01-01 PROCEDURE — 36415 COLL VENOUS BLD VENIPUNCTURE: CPT

## 2022-01-01 PROCEDURE — 82803 BLOOD GASES ANY COMBINATION: CPT

## 2022-01-01 PROCEDURE — 99285 EMERGENCY DEPT VISIT HI MDM: CPT

## 2022-01-01 RX ORDER — BUMETANIDE 0.25 MG/ML
INJECTION, SOLUTION INTRAMUSCULAR; INTRAVENOUS
Status: DISCONTINUED
Start: 2022-01-01 | End: 2022-01-01 | Stop reason: HOSPADM

## 2022-01-01 RX ORDER — DILTIAZEM HYDROCHLORIDE 5 MG/ML
INJECTION INTRAVENOUS
Status: COMPLETED
Start: 2022-01-01 | End: 2022-01-01

## 2022-01-01 RX ORDER — HYDROCODONE BITARTRATE AND ACETAMINOPHEN 7.5; 325 MG/1; MG/1
1 TABLET ORAL 3 TIMES DAILY
Status: DISCONTINUED | OUTPATIENT
Start: 2022-01-01 | End: 2022-01-01 | Stop reason: HOSPADM

## 2022-01-01 RX ORDER — DILTIAZEM HYDROCHLORIDE 5 MG/ML
20 INJECTION INTRAVENOUS ONCE
Status: COMPLETED | OUTPATIENT
Start: 2022-01-01 | End: 2022-01-01

## 2022-01-01 RX ORDER — DILTIAZEM HYDROCHLORIDE 5 MG/ML
25 INJECTION INTRAVENOUS ONCE
Status: COMPLETED | OUTPATIENT
Start: 2022-01-01 | End: 2022-01-01

## 2022-01-01 RX ORDER — DEXTROSE MONOHYDRATE 50 MG/ML
INJECTION, SOLUTION INTRAVENOUS
Status: DISCONTINUED
Start: 2022-01-01 | End: 2022-01-01 | Stop reason: HOSPADM

## 2022-01-01 RX ORDER — AMIODARONE HYDROCHLORIDE 50 MG/ML
300 INJECTION, SOLUTION INTRAVENOUS ONCE
Status: DISCONTINUED | OUTPATIENT
Start: 2022-01-01 | End: 2022-01-01

## 2022-01-01 RX ORDER — AMIODARONE HYDROCHLORIDE 50 MG/ML
150 INJECTION, SOLUTION INTRAVENOUS ONCE
Status: DISCONTINUED | OUTPATIENT
Start: 2022-01-01 | End: 2022-01-01 | Stop reason: SDUPTHER

## 2022-01-01 RX ADMIN — BUMETANIDE 3 MG: 0.25 INJECTION, SOLUTION INTRAMUSCULAR; INTRAVENOUS at 01:35

## 2022-01-01 RX ADMIN — HYDROCODONE BITARTRATE AND ACETAMINOPHEN 1 TABLET: 7.5; 325 TABLET ORAL at 20:28

## 2022-01-01 RX ADMIN — DILTIAZEM HYDROCHLORIDE 25 MG: 5 INJECTION INTRAVENOUS at 00:46

## 2022-01-01 RX ADMIN — DILTIAZEM HYDROCHLORIDE 20 MG: 5 INJECTION, SOLUTION INTRAVENOUS at 00:31

## 2022-01-01 RX ADMIN — APIXABAN 2.5 MG: 2.5 TABLET, FILM COATED ORAL at 20:31

## 2022-01-01 RX ADMIN — DEXTROSE MONOHYDRATE 150 MG: 50 INJECTION, SOLUTION INTRAVENOUS at 01:13

## 2022-01-01 RX ADMIN — BUMETANIDE 3 MG: 0.25 INJECTION INTRAMUSCULAR; INTRAVENOUS at 16:45

## 2022-01-01 RX ADMIN — DILTIAZEM HYDROCHLORIDE 20 MG: 5 INJECTION INTRAVENOUS at 00:31

## 2022-01-01 ASSESSMENT — PAIN SCALES - GENERAL: PAINLEVEL_OUTOF10: 7

## 2022-02-15 NOTE — PROGRESS NOTES
Chief Complaint   Patient presents with    Diabetes    Hypertension       Have you seen any other physician or provider since your last visit no    Have you had any other diagnostic tests since your last visit? no    Have you changed or stopped any medications since your last visit? no       Health Maintenance Due This Visit   Colonoscopy No   Mammogram No   Annual Wellness Visit No   Microalbumin No   HgbA1C No   Diabetic Eye Exam Yes- pt does not want to go. [facility]   NITHYA No   UDS Yes   Contract Yes      Review of Systems   Constitutional: Negative for chills, fatigue and fever. HENT: Negative for congestion, ear pain, rhinorrhea and sore throat. Eyes: Negative for discharge, redness and itching. Respiratory: Negative for cough and shortness of breath. Cardiovascular: Negative for chest pain, palpitations and leg swelling. Gastrointestinal: Negative for abdominal pain, constipation, diarrhea, nausea and vomiting. Endocrine: Negative for cold intolerance and heat intolerance. Genitourinary: Negative for dysuria. Musculoskeletal: Negative for arthralgias and joint swelling. Skin: Negative for rash and wound. Neurological: Negative for weakness and headaches. Hematological: Negative for adenopathy. Psychiatric/Behavioral: Negative for dysphoric mood and sleep disturbance. The patient is not nervous/anxious. Vaccine Information Sheet, \"Influenza - Inactivated\"  given to Joe Layman, or parent/legal guardian of  Joe Prince and verbalized understanding. Patient responses:    Have you ever had a reaction to a flu vaccine? No  Do you have any current illness? No  Have you ever had Guillian [place] Syndrome? No  Do you have a serious allergy to any of the follow: Neomycin, Polymyxin, Thimerosal, eggs or egg products? No    Flu vaccine given per order. Please see immunization tab. Risks and benefits explained. Current VIS given.     Pt stayed in DISPLAY PLAN FREE TEXT DISPLAY PLAN FREE TEXT

## 2022-03-15 NOTE — ED PROVIDER NOTES
[address] [facility] [address] [facility] [address] ENCOUNTER      Pt Name: Blaise Nunez  MRN: [id number]  YOB: 1965  Date of evaluation: 3/15/2022  Provider: Nidhi Rainey DO    CHIEF COMPLAINT       Chief Complaint   Patient presents with    Extremity Weakness         HISTORY OF PRESENT ILLNESS  (Location/Symptom, Timing/Onset, Context/Setting, Quality, Duration, Modifying Factors, Severity.)   Blaise Nunez is a 60 y.o. female who presents to the emergency department for weakness and shortness of breath. Patient's family states that she has had a weakness for quite some time at least for the past month she has not been able to get up out of bed and do much. They advised that any type of activity cause her is to get extremely short of breath she really cannot even get up out of bed to the bedside commode without becoming short of breath. Patient does have a history of kidney insufficiency she does follow with a nephrologist.  She states they come from [place] and she sees them in [place] but she cannot remember what the name is. She advised that she takes 6 mg of Bumex daily. Patient's and family believes the kidney doctor that she sees is from [email or web address] in [place]. Patient denies any chest pain with her shortness of breath she states that she has really not been able to eat or drink much over last couple days because she has not felt very well. She has had some nausea but no vomiting. She is has had some diarrhea. Denies any fevers or chills. Denies any body aches out of ordinary but she states that she has gained quite a bit of weight with fluid overload. Patient states that she was able to urinate prior to coming to the hospital and she thought it was a good amount of urine that she had output with. Patient does use oxygen at home 4 L nasal cannula continuously. She also uses CPAP at night for sleep apnea. Denies any sick contacts that she is aware of. Patient is vaccinated and booster with [facility]. 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 Review of laboratory studies from the last time the patient was here back on 9/25/2021 showed a creatinine of 3.3 and a BUN of 63. However she had been as high as 4.1 on 9/24/2021 but has been ranging a 1.4 between 3/12/2021 and 7/28/2021. Nursing notes were reviewed. REVIEW OFSYSTEMS    (2-9 systems for level 4, 10 or more for level 5)   ROS:  General:  No fevers, no chills, +weakness/fatigue  Cardiovascular:  No chest pain, no palpitations  Respiratory:  +shortness of breath, no cough, no wheezing  Gastrointestinal:  No pain, + nausea, no vomiting, + diarrhea  Musculoskeletal:  No muscle pain, no joint pain  Skin:  No rash, no easy bruising  Neurologic:  No speech problems, no headache, no extremity weakness  Psychiatric:  No anxiety  Genitourinary:  No dysuria, no hematuria    Except as noted above the remainder of the review of systems was reviewed and negative.        PAST MEDICAL HISTORY     Past Medical History:   Diagnosis Date    Arthritis     left foot    Carpal tunnel syndrome, bilateral     Chronic back pain     Diverticulitis     Foot fracture, left     H/O blood clots     Hypertension     Type II or unspecified type diabetes mellitus without mention of complication, not stated as uncontrolled     Unspecified sleep apnea          SURGICAL HISTORY       Past Surgical History:   Procedure Laterality Date    COLOSTOMY  2004        FRACTURE SURGERY      left foot    REVISION COLOSTOMY  2005    [address] MEDICATIONS       Discharge Medication List as of 3/16/2022  1:53 AM      CONTINUE these medications which have NOT CHANGED    Details   vitamin D (ERGOCALCIFEROL) 1.25 MG (24920 UT) CAPS capsule TAKE 1 CAPSULE BY MOUTH EVERY WEEK., Disp-12 capsule, R-3Normal      fluticasone (FLONASE) 50 MCG/ACT nasal spray SHAKE LIQUID AND USE 2 SPRAYS IN EACH NOSTRIL DAILY, Disp-16 g, R-5Normal      bumetanide (BUMEX) 1 MG tablet Take twice daily as needed. , Disp-60 tablet, R-3Normal      glimepiride (AMARYL) 4 MG tablet TAKE 1 TABLET BY MOUTH EVERY MORNING BEFORE BREAKFAST, Disp-90 tablet, R-3Normal      loratadine (CLARITIN) 10 MG tablet TAKE 1 TABLET BY MOUTH EVERY DAY, Disp-90 tablet, R-3Normal      docusate sodium (DOK) 100 MG capsule TAKE ONE CAPSULE BY MOUTH TWICE DAILY, Disp-180 capsule, R-3Normal      tiZANidine (ZANAFLEX) 4 MG tablet TAKE 1 TABLET BY MOUTH EVERY 8 HOURS., Disp-270 tablet, R-3Normal      ELIQUIS 2.5 MG TABS tablet TAKE 1 TABLET BY MOUTH EVERY 12 HOURS, Disp-60 tablet, R-3, DAWNormal      omeprazole (PRILOSEC) 40 MG delayed release capsule TAKE 1 CAPSULE BY MOUTH EVERY MORNING BEFORE BREAKFAST, Disp-30 capsule, R-5Normal      methylPREDNISolone (MEDROL DOSEPACK) 4 MG tablet Take by mouth., Disp-1 kit, R-0Normal      pregabalin (LYRICA) 150 MG capsule Take 1 capsule by mouth 2 times daily for 60 days.  TAKE 1 CAPSULE BY MOUTH TWICE DAILY, Disp-60 capsule, R-5Normal      Albuterol Sulfate, sensor, (PROAIR DIGIHALER) 108 (90 Base) MCG/ACT AEPB Inhale 1 puff into the lungs every 4 hours as needed (SOB), Disp-1 each, R-5Normal      ipratropium-albuterol (DUONEB) 0.5-2.5 (3) MG/3ML SOLN nebulizer solution Inhale 3 mLs into the lungs every 4 hours as needed for Shortness of Breath, Disp-360 mL, R-5Normal      atorvastatin (LIPITOR) 40 MG tablet TAKE 1 TABLET BY MOUTH ONCE DAILY, Disp-30 tablet, R-5Normal      umeclidinium-vilanterol (ANORO ELLIPTA) 62.5-25 MCG/INH AEPB inhaler Inhale 1 puff into the lungs daily, Disp-1 each, R-5Normal      Lancets (ONETOUCH DELICA PLUS [id number]) MISC TEST TWICE DAILY, Disp-100 each, R-5Normal      ONETOUCH ULTRA strip USE TO TEST BLOOD SUGAR TWICE A DAY, Disp-600 strip, R-1Normal      montelukast (SINGULAIR) 10 MG tablet take 1 tablet by mouth at bedtime, Disp-90 tablet,R-3Normal triamcinolone (KENALOG) 0.1 % cream APPLY TO AFFECTED AREA TWICE A DAY, Disp-30 g, R-0, Normal      diclofenac sodium (VOLTAREN) 1 % GEL Apply 2 g topically 4 times daily as needed for Pain, Topical, 4 TIMES DAILY PRN Starting Thu 2020, Disp-1 Tube, R-5, Print      !! Elastic Bandages & Supports (KNEE BRACE) MISC Disp-1 each, R-0, PrintRight knee brace. Size XL. Elastic/compression. Blood Glucose Monitoring Suppl (ONE TOUCH ULTRA MINI) w/Device KIT Disp-1 kit, R-0, Normal      !! Elastic Bandages & Supports (CARPAL TUNNEL WRIST STABILIZER) MISC Disp-2 each, R-0, PrintBilateral carpal tunnel splints for carpal tunnel syndrome bilaterally (Dx: G56.01, G56.02)       ! ! - Potential duplicate medications found. Please discuss with provider.           ALLERGIES     Erythromycin    FAMILY HISTORY       Family History   Problem Relation Age of Onset    Lung Cancer Mother     Heart Attack Father     Diabetes Paternal Grandfather     Stroke Paternal Grandfather           SOCIAL HISTORY       Social History     Socioeconomic History    Marital status: Legally      Spouse name: Not on file    Number of children: Not on file    Years of education: Not on file    Highest education level: Not on file   Occupational History    Not on file   Tobacco Use    Smoking status: Former Smoker     Packs/day: 2.00     Years: 20.00     Pack years: 40.00     Quit date: 2005     Years since quittin.2    Smokeless tobacco: Never Used   Vaping Use    Vaping Use: Every day    Start date: 2016    Substances: Always   Substance and Sexual Activity    Alcohol use: No    Drug use: No    Sexual activity: Not on file   Other Topics Concern    Not on file   Social History Narrative    Not on file     Social Determinants of Health     Financial Resource Strain:     Difficulty of Paying Living Expenses: Not on file   Food Insecurity:     Worried About Running Out of Food in the Last Year: Not on file  Ran Out of Food in the Last Year: Not on file   Transportation Needs:     Lack of Transportation (Medical): Not on file    Lack of Transportation (Non-Medical): Not on file   Physical Activity:     Days of Exercise per Week: Not on file    Minutes of Exercise per Session: Not on file   Stress:     Feeling of Stress : Not on file   Social Connections:     Frequency of Communication with Friends and Family: Not on file    Frequency of Social Gatherings with Friends and Family: Not on file    Attends Islam Services: Not on file    Active Member of [address] [place] [address] [employer] or Organizations: Not on file    Attends Club or Organization Meetings: Not on file    Marital Status: Not on file   Intimate Partner Violence:     Fear of Current or Ex-Partner: Not on file    Emotionally Abused: Not on file    Physically Abused: Not on file    Sexually Abused: Not on file   Housing Stability:     Unable to Pay for Housing in the Last Year: Not on file    Number of [place] in the Last Year: Not on file    Unstable Housing in the Last Year: Not on file         PHYSICAL EXAM    (up to 7 for level 4, 8 or more for level 5)     ED Triage Vitals   BP Temp Temp src Pulse Resp SpO2 Height Weight   -- -- -- -- -- -- -- --       Physical Exam  General :Patient is awake, alert, oriented, in no acute distress, nontoxic appearing  HEENT: Pupils are equally round and reactive to light, EOMI, conjunctivae clear. Oral mucosa is moist, no exudate. Uvula is midline  Cardiac: Heart regular rate, rhythm, no murmurs, rubs, or gallops, 2-3 pitting edema to the bilateral lower extremities all the way up to the abdomen itself. Just listening to abdominal sounds it leaves a reading impression on her abdomen the stays are for several seconds before going away. Lungs: Decreased breath sounds throughout all lung fields with bibasilar crackles but difficult to hear because of body habitus, no obvious wheezing or rhonchi noted.  There is no use of accessory muscles. Abdomen: Abdomen is soft, nontender, nondistended. There is no firm or pulsatile masses, no rebound rigidity or guarding. Morbidly obese  Musculoskeletal: 5 out of 5 strength in all 4 extremities. No focal muscle deficits are appreciated  Neuro: Motor intact, sensory intact, level of consciousness is normal  Dermatology: Skin is warm and dry  Psych: Mentation is grossly normal, cognition is grossly normal. Affect is appropriate. DIAGNOSTIC RESULTS     EKG: All EKG's are interpreted by the Emergency Department Physician who either signs or Co-signs this chart in the [address] a cardiologist.    The EKG interpreted by me shows sinus rhythm. Multiform ventricular premature complexes. Incomplete right bundle branch block. Low voltage extremity and precordial leads. Heart rate is 65 bpm, NH interval is 160 ms, QRS duration is 117 ms, QT is 4 and 36 and QTC is 454 ms. No acute T wave inversions concerning for acute myocardial ischemia. No ST elevations concerning for acute myocardial infarction. RADIOLOGY:   Non-plain film images such as CT, Ultrasound and MRI are read by the radiologist. Plain radiographic images are visualized and preliminarily interpreted by the emergency physician with the below findings:      ? Radiologist's Report Reviewed:  XR CHEST PORTABLE   Final Result   1. Cardiomegaly with pulmonary venous congestion and early pulmonary edema.             ED BEDSIDE ULTRASOUND:   Performed by ED Physician - none    LABS:    I have reviewed and interpreted all of the currently available lab results from this visit (ifapplicable):  Results for orders placed or performed during the hospital encounter of 03/15/22   COVID-19, Rapid    Specimen: Nasopharyngeal Swab   Result Value Ref Range    SARS-CoV-2, NAAT Not Detected Not Detected   Blood Gas, Arterial   Result Value Ref Range    pH, Arterial 7.346 (L) 7.350 - 7.450    pCO2, Arterial 44.5 35.0 - 45.0 mmHg    pO2, Arterial 70.9 (L) 80.0 - 100.0 mmHg    HCO3, Arterial 23.8 22.0 - 26.0 mmol/L    Base Excess, Arterial -1.9 -3.0 - 3.0 mmol/L    O2 Sat, Arterial 92.3 >92 %    TCO2, Arterial 25.2 24.0 - 30.0 mmol/L    O2 Therapy Unknown     FIO2 0.40 Not Established %   CBC with Auto Differential   Result Value Ref Range    WBC 10.2 4.0 - 11.0 K/uL    RBC 4.85 3.80 - 5.80 M/uL    Hemoglobin 13.7 11.5 - 16.5 g/dL    Hematocrit 43.4 37.0 - 47.0 %    MCV 89.5 80.0 - 100.0 fL    MCH 28.2 27.0 - 32.0 pg    MCHC 31.6 31.0 - 35.0 g/dL    RDW 19.1 (H) 11.0 - 16.0 %    Platelets 031 783 - 663 K/uL    MPV 12.3 (H) 6.0 - 10.0 fL    Neutrophils % 72.1 %    Immature Granulocytes % 0.7 0.0 - 5.0 %    Lymphocytes % 13.2 %    Monocytes % 12.5 %    Eosinophils % 1.1 %    Basophils % 0.4 %    Neutrophils Absolute 7.4 2.0 - 7.5 K/uL    Immature Granulocytes # 0.1 K/uL    Lymphocytes Absolute 1.4 (L) 1.5 - 4.0 K/uL    Monocytes Absolute 1.3 (H) 0.2 - 0.8 K/uL    Eosinophils Absolute 0.1 0.0 - 0.4 K/uL    Basophils Absolute 0.0 0.0 - 0.1 K/uL   Comprehensive Metabolic Panel w/ Reflex to MG   Result Value Ref Range    Sodium 129 (L) 136 - 145 mmol/L    Potassium reflex Magnesium 4.6 3.4 - 5.1 mmol/L    Chloride 90 (L) 98 - 107 mmol/L    CO2 21 20 - 30 mmol/L    Anion Gap 18 (H) 3 - 16    Glucose 84 74 - 106 mg/dL    BUN 53 (H) 6 - 20 mg/dL    CREATININE 2.4 (H) 0.4 - 1.2 mg/dL    GFR Non-African American 21 (L) >59    GFR  25 (L) >59    Calcium 9.2 8.5 - 10.5 mg/dL    Total Protein 7.3 6.4 - 8.3 g/dL    Albumin 3.1 (L) 3.4 - 4.8 g/dL    Albumin/Globulin Ratio 0.7 (L) 0.8 - 2.0    Total Bilirubin 4.0 (H) 0.3 - 1.2 mg/dL    Alkaline Phosphatase 195 (H) 25 - 100 U/L     (H) 4 - 36 U/L     (H) 8 - 33 U/L    Globulin 4.2 Not Established g/dL   Troponin   Result Value Ref Range    Troponin <0.30 <0.30 ng/mL   Brain Natriuretic Peptide   Result Value Ref Range    Pro-BNP 21,502 (H) 0 - 1,800 pg/mL   Lactic Acid   Result Value Ref Range Lactic Acid 5.1 (HH) 0.4 - 2.0 mmol/L        All other labs were within normal range or not returned as of this dictation. EMERGENCY DEPARTMENT COURSE and DIFFERENTIAL DIAGNOSIS/MDM:   Vitals:    Vitals:    03/16/22 0104 03/16/22 0119 03/16/22 0130 03/16/22 0134   BP:  (!) 145/95  116/87   Pulse: 115 110 108 108   Resp: 10 11 11 13   Temp:       TempSrc:       SpO2: 93% 94% 95% 96%   Weight:       Height:           MEDICATIONS ADMINISTERED IN ED:  Medications   bumetanide (BUMEX) 3 mg in sodium chloride 0.9 % 62 mL infusion (0 mg IntraVENous Stopped 3/15/22 1945)   dilTIAZem injection 20 mg (20 mg IntraVENous Given 3/16/22 0031)   dilTIAZem injection 25 mg (25 mg IntraVENous Given 3/16/22 0046)   amiodarone (CORDARONE) 150 mg in dextrose 5 % 100 mL bolus (0 mg IntraVENous Stopped 3/16/22 0123)       After initial evaluation examination I did have a conversation with the patient about the upcoming plan, treatment possible disposition which they are agreeable to the times dictation. Patient upon arrival here her pulse ox is actually running low in the 88-89 range she had to be placed on a 5 to 6 L nasal cannula just to get it up into the lower 90 range. She does use 4 L nasal cannula at home. So respiratory was called to the bed for a immediate ABG at that time we would place her on nonrebreather if needed either that or just go ahead and place her stopgap with BiPAP. Patient initially refusing to have the BiPAP because she states she has CPAP at home and she did not want to wear the machine it felt like it was smothering her after long discussion with her she was agreeable to have BiPAP if needed and has decided she is a full code at this time. Patient advised we need to perform portable chest radiograph to rule out any pneumonic process but do have concern for fluid overload. Patient will have CBC, CMP, troponin, BNP, lactic acid, rapid screening Covid test for most likely transfer and then a ABG performed. Patient will also have twelve-lead EKG performed. Patient is takes a rather large dose of Bumex at home 6 mg a day we will probably give her IV diuretics here because she does present with symptoms of fluid overload. Patient does have significant respiratory distress especially if she is in any position besides sitting up. She also has a lot of exertional dyspnea. Patient will most likely need to be admitted and transferred to facility higher level care because of her renal insufficiency in the amount of diuretics that she is requiring we do not have been nephrology available to us as a specialty here. Rapid Covid screen was negative    Blood work showed white count is a 10,200, hemoglobin is 13.7, hematocrit 43.3, platelet counts 794. Lactic acid elevated at 5.1. Comprehensive metabolic panel was benign except for sodium 129 which is low, chloride of 90 which is also low, and a gap of 18 which is mildly elevated, BUN of 53 and a creatinine of 2.4. Patient's alkaline phosphatase is elevated at 195, ALT elevated at 145 and AST elevated at 182. Troponin was nondetectable less than 0.30. proBNP was 21,502. Review of laboratory studies from the last time the patient was here back on 9/25/2021 showed a creatinine of 3.3 and a BUN of 63. However she had been as high as 4.1 on 9/24/2021 but has been ranging a 1.4 between 3/12/2021 and 7/28/2021. ABG showed pH of 7.346 which is just slightly low, PCO2 is 44.5, PCO2 was low at 70.9, bicarb is 23.8, base excess is -1.9. Portable chest radiograph read by radiology as cardiomegaly with pulmonary venous congestion and early pulmonary edema    Patient's radiological diagnostic studies were discussed with her she does state her understanding. The patient's hypoxia on her ABG we will go ahead and try her on a Ventimask to see if we get her oxygen saturation up if not she may have to be placed on a nonrebreather.   Her CO2 is actually good and does not need to be placed on BiPAP she could possibly benefit from the use of CPAP but I am not sure exactly if the patient would be agreeable to allow us to do this since she has a CPAP machine at home and she states she feels like it smothering her. Discussing the patient's home medication dosing of 6 mg a day with pharmacy about the appropriate transition which is actually one-to-one from oral to IV that we will just place her on a Bumex drip for 3 mg over the next 3 hours or a milligram an hour for 3 hours. Patient placed on 50% Ventimask but never did really get out of the upper 80s to low 90s. She is now on nonrebreather at this time satting 98%. We will also have respiratory therapy evaluate the patient to see if she would be a candidate for possibly high flow nasal cannula or just to keep her on the nonrebreather as long as she is tolerating this. Positive findings on the chest radiograph consistent with pulmonary vascular congestion and early pulmonary edema. Advised that she will need to be admitted to facility higher level care especially since she is on the Bumex drip. We will contact [facility] since this is where she had been transferred to previously and then where her nephrologist is located. The [facility] will be contacted to go ahead and start the process of trying to get the patient transferred and accepted to a facility of higher level of care. .  Case discussed with Dr. Daksha Adler.  He did accept the patient in transfer there to an ICU bed. He they will also contact her nephrologist to make sure there are viable and know that the patient has been accepted and is on a wait list.  There is no ICU bed available at this time but they will keep us updated and informed when the bed is available. Until then the patient will be transferred there by EMS. Her radiological diagnostic studies will accompany her at time of transfer.   Until then we may depending on the patient's wishes tried to contact other facilities to see if there is any way that we can get her into a facility faster than waiting on this the one bed at [facility] however unfortunately to get beds available we been having to go out of the [facility] or to the [place] part of the [facility] for travel such as [place] or [address] area I am not sure if the patient and her family would be willing or want to travel that far. Otherwise I will continue evaluate here and will still continues the [facility] to try to locate or find other facilities. ED crit    CRITICAL CARE:  The high probability of sudden, clinically significant deterioration in the patient's condition required the highest level of my preparedness to intervene urgently. The services I provided to this patient were to treat and/or prevent clinically significant deterioration that could result in:woesening cardiopulmonary symptoms/compromise/demise. Services included the following: chart data review, reviewing nursing notes and/or old charts, documentation time, consultant collaboration regarding findings and treatment options, medication orders and management, direct patient care, re-evaluations, vital sign assessments and ordering, interpreting and reviewing diagnostic studies/lab tests. Aggregate critical care time was 45 minutes, which includes only time during which I was engaged in work directly related to the patient's care, as described above, whether at the bedside or elsewhere in the Emergency Department. It did not include time spent performing other reported procedures or the services of residents, students, nurses or physician assistants. 1900  I have signed out [facility] Emergency Department care to Dr. Mahnaz Beavers. We discussed the pertinent history, physical exam, completed/pending test results (if applicable) and current treatment plan.  Please refer to his/her chart for the patients remaining Emergency Department course and final disposition. CONSULTS:  None    PROCEDURES:  Procedures    CRITICAL CARE TIME    Total Critical Care time was 0 minutes, excluding separately reportable procedures. There was a high probability of clinically significant/life threatening deterioration in the patient's condition which required my urgent intervention. FINAL IMPRESSION      1. Pulmonary vascular congestion    2. Chronic renal impairment, unspecified CKD stage          DISPOSITION/PLAN   DISPOSITION        PATIENT REFERRED TO:  No follow-up provider specified. DISCHARGE MEDICATIONS:  Discharge Medication List as of 3/16/2022  1:53 AM          Comment: Please note this report has been produced using speech recognition software and may contain errorsrelated to that system including errors in grammar, punctuation, and spelling, as well as words and phrases that may be inappropriate. If there are any questions or concerns please feel free to contact the dictating providerfor clarification.     Alyssa Aragon DO  Attending Emergency Physician              Alyssa Aragon, DO  03/15/22 [address] DO  03/16/22 7530

## 2022-03-15 NOTE — ED PROVIDER NOTES
Emergency Department Encounter  Location: [address] [place] [address] Court    Patient: Kasi Friedman  MRN: [id number]  : 1965  Date of evaluation: 3/19/6390  ED Provider: Jareth Fraire MD    [address] was checked out to me by Dr. Freddy Barber. Please see his/her initial documentation for details of the patient's initial ED presentation, physical exam and completed studies. In brief, Kasi Friedman is a 60 y.o. female that presented to the emergency department for general weakness and shortness of breath for the last 6 days. She's had BRANDON with accumulating edema.   + nausea and diarrhea.    + decreased appetite. She has CRF but is not on dialysis. She takes Bumex 6mg/day at baseline. She is on baseline oxygen at [facility]. Her sats were mid 80's so she was turned up to [facility]. She was then switched to a NRB to get oxygen into the 90's. She is currently on a Bumex drip and is diuresing. She is on Eliquis for h/o BLE DVT's. She is on chronic pain meds for CBP. CXR shows venous congestion and early pulmonary edema. CBC normal  CMP with sodium of 129. BUN/Cr of 53/2.4; LFT's slightly elevated probably secondary to fatty liver.   BNP 21,502  Trop negative  LA of 5.1    COVID negative    I have reviewed and interpreted all of the currently available lab results and diagnostics from this visit:  Results for orders placed or performed during the hospital encounter of 03/15/22   COVID-19, Rapid    Specimen: Nasopharyngeal Swab   Result Value Ref Range    SARS-CoV-2, NAAT Not Detected Not Detected   Blood Gas, Arterial   Result Value Ref Range    pH, Arterial 7.346 (L) 7.350 - 7.450    pCO2, Arterial 44.5 35.0 - 45.0 mmHg    pO2, Arterial 70.9 (L) 80.0 - 100.0 mmHg    HCO3, Arterial 23.8 22.0 - 26.0 mmol/L    Base Excess, Arterial -1.9 -3.0 - 3.0 mmol/L    O2 Sat, Arterial 92.3 >92 %    TCO2, Arterial 25.2 24.0 - 30.0 mmol/L    O2 Therapy Unknown     FIO2 0.40 Not Established %   CBC with Auto Differential   Result Value Ref Range    WBC 10.2 4.0 - 11.0 K/uL    RBC 4.85 3.80 - 5.80 M/uL    Hemoglobin 13.7 11.5 - 16.5 g/dL    Hematocrit 43.4 37.0 - 47.0 %    MCV 89.5 80.0 - 100.0 fL    MCH 28.2 27.0 - 32.0 pg    MCHC 31.6 31.0 - 35.0 g/dL    RDW 19.1 (H) 11.0 - 16.0 %    Platelets 345 645 - 241 K/uL    MPV 12.3 (H) 6.0 - 10.0 fL    Neutrophils % 72.1 %    Immature Granulocytes % 0.7 0.0 - 5.0 %    Lymphocytes % 13.2 %    Monocytes % 12.5 %    Eosinophils % 1.1 %    Basophils % 0.4 %    Neutrophils Absolute 7.4 2.0 - 7.5 K/uL    Immature Granulocytes # 0.1 K/uL    Lymphocytes Absolute 1.4 (L) 1.5 - 4.0 K/uL    Monocytes Absolute 1.3 (H) 0.2 - 0.8 K/uL    Eosinophils Absolute 0.1 0.0 - 0.4 K/uL    Basophils Absolute 0.0 0.0 - 0.1 K/uL   Comprehensive Metabolic Panel w/ Reflex to MG   Result Value Ref Range    Sodium 129 (L) 136 - 145 mmol/L    Potassium reflex Magnesium 4.6 3.4 - 5.1 mmol/L    Chloride 90 (L) 98 - 107 mmol/L    CO2 21 20 - 30 mmol/L    Anion Gap 18 (H) 3 - 16    Glucose 84 74 - 106 mg/dL    BUN 53 (H) 6 - 20 mg/dL    CREATININE 2.4 (H) 0.4 - 1.2 mg/dL    GFR Non-African American 21 (L) >59    GFR  25 (L) >59    Calcium 9.2 8.5 - 10.5 mg/dL    Total Protein 7.3 6.4 - 8.3 g/dL    Albumin 3.1 (L) 3.4 - 4.8 g/dL    Albumin/Globulin Ratio 0.7 (L) 0.8 - 2.0    Total Bilirubin 4.0 (H) 0.3 - 1.2 mg/dL    Alkaline Phosphatase 195 (H) 25 - 100 U/L     (H) 4 - 36 U/L     (H) 8 - 33 U/L    Globulin 4.2 Not Established g/dL   Troponin   Result Value Ref Range    Troponin <0.30 <0.30 ng/mL   Brain Natriuretic Peptide   Result Value Ref Range    Pro-BNP 21,502 (H) 0 - 1,800 pg/mL   Lactic Acid   Result Value Ref Range    Lactic Acid 5.1 (HH) 0.4 - 2.0 mmol/L     XR CHEST PORTABLE    Result Date: 3/15/2022  History: Shortness of air. AP chest. COMPARISON: 9/25/2021. FINDINGS: Cardiomegaly. Pulmonary venous congestion with cephalization of vasculature. Status Ordering Provider    03/16/22 0115 03/16/22 0100  amiodarone (CORDARONE) injection 150 mg  ONCE         Acknowledged KOBE HAMM    85/14/74 7300 03/16/22 0102  bumetanide (BUMEX) 3 mg in dextrose 5 %, sodium chloride 0.9 % 62 mL infusion  CONTINUOUS         Ordered KOBE HAMM    84/16/83 0222 03/16/22 0030  dilTIAZem injection 20 mg  ONCE         Last MAR action: Given - by Kylee Miranda on 03/16/22 at [facility], Brenda Gregg    72/06/74 8745 03/16/22 0045  dilTIAZem injection 25 mg  ONCE         Last MAR action: Given - by Cuco Escamilla on 03/16/22 at 0026 [facility]    56/29/09 2789 03/15/22 1841  HYDROcodone-acetaminophen (NORCO) 7.5-325 MG per tablet 1 tablet  3 TIMES DAILY         Last MAR action: Given - by Kylee Miranda on 03/15/22 at 2028 [facility]    03/15/22 2100 03/15/22 2024  apixaban (ELIQUIS) tablet 2.5 mg  2 TIMES DAILY         Last MAR action: Given - by Kylee Miranda on 03/15/22 at 2031 [facility]    03/15/22 1630 03/15/22 1627  bumetanide (BUMEX) 3 mg in sodium chloride 0.9 % 62 mL infusion  CONTINUOUS         Last MAR action: Stopped - by Kylee Miranda on 03/15/22 at [address] [facility], [address]          Final Impression      1. Pulmonary vascular congestion    2.  Chronic renal impairment, unspecified CKD stage        DISPOSITION Decision To Transfer 03/15/2022 06:01:58 PM     (Please note that portions of this note may have been completed with a voice recognition program. Efforts were made to edit the dictations but occasionally words are mis-transcribed.)    Eligio Lofton MD  [address] Jori Perera MD  03/16/22 0159

## 2022-03-15 NOTE — ED TRIAGE NOTES
Pt reports feeling weakness since last Wednesday. Pt and  both state that shes been dozing off \"really bad\" and has been very shaky. Pt states she wears 4 liters of oxygen regularly at home, but when presented in the ED o2 was at 84% on 4LNC  Pt also reports that she does not want a BIPAP due to feeling like she smoothers and wears a CPAP at home for sleep apnea.    Hetal Conley, [employer] student

## 2022-03-15 NOTE — ED NOTES
Dr. Keerthi Dudley notified that Sat is decreasing to 87-88% on 5LNC, was increased to 6 with no improvement, v/o for venti mask then NRB if doesn't improve with Venti Mask.    Pt did not tolerated Venti Mask at 50%, >92% on NRB.   03/15/22 1600 03/15/22 1602 03/15/22 1604   Oxygen Therapy   SpO2 (!) 87 % (!) 87 % (!) 89 %   O2 Device Nasal cannula Nasal cannula Venturi-mask   FiO2   --   --  35 %   O2 Flow Rate (L/min) 5 L/min  (incrased to 6LC) 6 L/min 6 L/min      03/15/22 1605 03/15/22 1606 03/15/22 1608   Oxygen Therapy   SpO2 (!) 86 % (!) 88 % 95 %   O2 Device Venturi-mask Venturi-mask  (placed on NRB at this time. ) Non-rebreather mask   FiO2  35 %  (increased to 50%) 50 %  --    O2 Flow Rate (L/min) 6 L/min 6 L/min 15 L/min        Terese Rust RN  03/15/22 1617

## 2022-03-16 NOTE — ED NOTES
Patient in route to [facility] via [id number].       Yeimi Lee RN  03/16/22 0151

## 2022-03-16 NOTE — PROGRESS NOTES
Tried patient on High flow nasal cannula at different liter flows and was unable to obtain a sp02 of greater than 89%. Pt stated she wears a sleep apnea machine at home and was unable to provide me with what type, referring to either a CPAP or BiPAP. I then set up a trilogy on CPAP at 11 cmH20 and a Fi02 at 100 and was unable to meet her ventilatory demand so I switched her over to bilevel and her sp02 gradually increased to 95% and she appeared to be resting comfortably.  Hetal Younger RRT

## 2022-09-30 ENCOUNTER — TELEPHONE (OUTPATIENT)
Dept: SURGERY | Facility: CLINIC | Age: 57
End: 2022-09-30

## 2022-11-03 ENCOUNTER — TELEPHONE (OUTPATIENT)
Dept: CARDIOLOGY | Facility: CLINIC | Age: 57
End: 2022-11-03